# Patient Record
Sex: MALE | Race: WHITE | NOT HISPANIC OR LATINO | URBAN - NONMETROPOLITAN AREA
[De-identification: names, ages, dates, MRNs, and addresses within clinical notes are randomized per-mention and may not be internally consistent; named-entity substitution may affect disease eponyms.]

---

## 2017-02-02 ENCOUNTER — TELEPHONE (OUTPATIENT)
Dept: CARDIOLOGY | Facility: CLINIC | Age: 62
End: 2017-02-02

## 2017-02-02 NOTE — TELEPHONE ENCOUNTER
Patient wife called requesting samples of Entresto 24-26mg bid due to no longer approved. Samples of Entresto 24-26mg ready for -28 tablets.

## 2017-02-03 ENCOUNTER — TELEPHONE (OUTPATIENT)
Dept: CARDIOLOGY | Facility: CLINIC | Age: 62
End: 2017-02-03

## 2017-02-07 ENCOUNTER — TELEPHONE (OUTPATIENT)
Dept: CARDIOLOGY | Facility: CLINIC | Age: 62
End: 2017-02-07

## 2017-02-10 ENCOUNTER — DOCUMENTATION (OUTPATIENT)
Dept: CARDIOLOGY | Facility: CLINIC | Age: 62
End: 2017-02-10

## 2017-03-01 ENCOUNTER — OFFICE VISIT (OUTPATIENT)
Dept: CARDIOLOGY | Facility: CLINIC | Age: 62
End: 2017-03-01

## 2017-03-01 VITALS
HEART RATE: 72 BPM | DIASTOLIC BLOOD PRESSURE: 58 MMHG | WEIGHT: 212 LBS | HEIGHT: 70 IN | SYSTOLIC BLOOD PRESSURE: 90 MMHG | BODY MASS INDEX: 30.35 KG/M2

## 2017-03-01 DIAGNOSIS — I42.9 CARDIOMYOPATHY (HCC): Primary | ICD-10-CM

## 2017-03-01 DIAGNOSIS — I50.30 CONGESTIVE HEART FAILURE WITH LV DIASTOLIC DYSFUNCTION, NYHA CLASS 2 (HCC): ICD-10-CM

## 2017-03-01 DIAGNOSIS — I20.9 ISCHEMIC CHEST PAIN (HCC): ICD-10-CM

## 2017-03-01 DIAGNOSIS — E78.00 HYPERCHOLESTEREMIA: ICD-10-CM

## 2017-03-01 DIAGNOSIS — E11.59 TYPE 2 DIABETES MELLITUS WITH OTHER CIRCULATORY COMPLICATION, WITH LONG-TERM CURRENT USE OF INSULIN (HCC): ICD-10-CM

## 2017-03-01 DIAGNOSIS — I25.110 CORONARY ARTERY DISEASE INVOLVING NATIVE CORONARY ARTERY OF NATIVE HEART WITH UNSTABLE ANGINA PECTORIS (HCC): Primary | ICD-10-CM

## 2017-03-01 DIAGNOSIS — Z78.9 PACED RHYTHM ON ELECTROCARDIOGRAM (ECG): ICD-10-CM

## 2017-03-01 DIAGNOSIS — Z79.899 MEDICATION MANAGEMENT: ICD-10-CM

## 2017-03-01 DIAGNOSIS — Z95.810 ICD (IMPLANTABLE CARDIOVERTER-DEFIBRILLATOR) IN PLACE: ICD-10-CM

## 2017-03-01 DIAGNOSIS — I25.10 CORONARY ARTERY DISEASE INVOLVING NATIVE CORONARY ARTERY OF NATIVE HEART WITHOUT ANGINA PECTORIS: ICD-10-CM

## 2017-03-01 DIAGNOSIS — R06.02 SHORTNESS OF BREATH: ICD-10-CM

## 2017-03-01 DIAGNOSIS — R53.83 OTHER FATIGUE: ICD-10-CM

## 2017-03-01 DIAGNOSIS — Z79.4 TYPE 2 DIABETES MELLITUS WITH OTHER CIRCULATORY COMPLICATION, WITH LONG-TERM CURRENT USE OF INSULIN (HCC): ICD-10-CM

## 2017-03-01 PROCEDURE — 93000 ELECTROCARDIOGRAM COMPLETE: CPT | Performed by: NURSE PRACTITIONER

## 2017-03-01 PROCEDURE — 99214 OFFICE O/P EST MOD 30 MIN: CPT | Performed by: NURSE PRACTITIONER

## 2017-03-01 PROCEDURE — 93289 INTERROG DEVICE EVAL HEART: CPT | Performed by: INTERNAL MEDICINE

## 2017-03-01 RX ORDER — FENOFIBRATE 145 MG/1
145 TABLET, COATED ORAL DAILY
COMMUNITY
End: 2019-12-04 | Stop reason: SDUPTHER

## 2017-03-01 RX ORDER — CARBIDOPA/LEVODOPA 25MG-250MG
2 TABLET ORAL 2 TIMES DAILY
COMMUNITY

## 2017-03-01 RX ORDER — ATORVASTATIN CALCIUM 80 MG/1
80 TABLET, FILM COATED ORAL DAILY
COMMUNITY
End: 2019-12-04 | Stop reason: SDUPTHER

## 2017-03-01 RX ORDER — NEBIVOLOL 5 MG/1
5 TABLET ORAL DAILY
COMMUNITY
End: 2019-06-05 | Stop reason: SDUPTHER

## 2017-03-01 RX ORDER — BACLOFEN 10 MG/1
10 TABLET ORAL 2 TIMES DAILY
COMMUNITY
End: 2017-09-06 | Stop reason: DRUGHIGH

## 2017-03-01 NOTE — PROGRESS NOTES
"Chief Complaint   Patient presents with   • Follow-up     6 month follow-up, brought medications with visit, recent labs per PCP. pacemaker checked today   • Chest Pain     reports has cp frequently which is not unusual for him   • Shortness of Breath     at rest and with activity   • Med Refill     need samples of entresto 24mg/26mg, nitro sl       Subjective       Adrian Samuel is a 61 y.o. male with complex cardiac problems for which he has had PTCA, bypass, and numerous caths in the past. A biventricular ICD placed in 2012. The last cath done was in March of 2015. The cath showed a lesion in the LAD after the LIMA and he had stenting of the same. Echo done in September 2015 showed his EF 30-35% with moderate to severe MR. He was supposed to had started Entresto, but apparently did not. He had been taking just losartan. In 2015, he was referred back to  heart failure clinic. Entresto restarted. Today he comes to the office and reports increase in nausea and \"feeling sick' toward evening. When lying down his fatigue improves. He is concerned over increase in chronic angina. Currently, his wife is ill and he is under more stress.        HPI         Cardiac History:    Past Surgical History   Procedure Laterality Date   • Heel spur excision       Removed   • Thyroid surgery Right 04/10/2009      Thyroid-Small colloid cyst right thyroid   • Cath lab procedure  1993     Cath-PTCA.   • Coronary artery bypass graft  1995     CABG LIMA TO LAD, SVG TO OM and SVG to PDA.   • Cath lab procedure  02/08/2005     Cath-() Patent Grafts   • Cath lab procedure  08/01/2005     Cath-(Dr. Sigala) Patent Grafts.   • Cath lab procedure  08/22/2006     Cath-Patent Grafts   • Converted (historical) holter  06/11/2006     Holter-PVC's noted   • Cath lab procedure  09/18/2007     Cath-100%LAD, 80%D2. 100%LCX, RCA. Patent Lima & SVG to PDA.   • Echo - converted  08/29/2007     Echo-EF50-55%, mild to mod. MR, mildly " hypokinetic septum.   • Cardiovascular stress test  02/19/2008     D.Myoview-75%THR. EF 43%. InferoLateral Infarct with Nba-Infarct Ischemia   • Other surgical history  11/10/2008     USGB-normal gallbladder and fatty liver   • Upper gastrointestinal endoscopy  11/10/2008     UGI-Small HH with slight narrowing of the distal esophagus.   • Other surgical history  11/13/2008     Hida scan-Normal   • Carotid artery - subclavian artery bypass graft  04/10/1989     Carotid US-No sig. stenosis noted   • Echo - converted  05/08/2009     Echo-EF45-49%, Moderate MR and PA pressure-42 mmHg.   • Cardiovascular stress test  05/08/2009     D.Myoview-78%THR. EF48%. Inferiolateral wall infarct and nba-infarct ischemia.   • Cath lab procedure  01/15/2010     Cath-Patent LIMA & SVG to PDA. 80% OM1-2.25 x 16mm Taxus Stent   • Cardiovascular stress test  03/03/2010     L.Myoview-Lateral Ischemia   • Echo - converted  01/18/2012     Echo-EF 25-30%   • Cath lab procedure  02/14/2012     Cath-IVUSLAD 60-70%, 2.55 x 20mm Promus stent Ramus   • Echo - converted  05/17/2012     Echo-EF<30%. AnteroSpectal WMA.   • Cardiovascular studies - converted  09/05/2012     MUGA-RVEF 41%, LVEF 38%   • Cardiovascular stress test  02/13/2013     L.Myoview-lateral wall ischemia   • Cath lab procedure  03/12/2013     Cath-3.5 x 28Promus stent SVG to RCA.   • Cardiovascular stress test  11/11/2013     L.Myoview-lateral wall ischemia   • Cath lab procedure  11/25/2013     Cath-(Dr. Sigala) EF30-35%, occluded circ and graft but increase collaterol flow manage medically.   • Other surgical history  02/28/2014     U/S Aorta-   • Cardiovascular stress test  11/18/2014     L.Myoview-([facility]) EF. Inferolateral Infarct   • Cath lab procedure  12/22/2014     Cath-85% SVG to PDA-   • Cardiovascular stress test  03/17/2015     L.Myoview-Sig Anterior Changes   • Cath lab procedure  03/18/2015     Cath-90%LIMA to LAD-80% SVG to PDA-2.75 x 8 Resolute   • Carotid artery  - subclavian artery bypass graft  05/21/2015     Carotid US-no hemodyanamically significant stenosis   • Echo - converted  09/02/2015     Echo-EF 30-35%, mod to severe MR   • Cardiac defibrillator placement         Current Outpatient Prescriptions   Medication Sig Dispense Refill   • aspirin 81 MG EC tablet Take 81 mg by mouth daily.     • atorvastatin (LIPITOR) 80 MG tablet Take 80 mg by mouth Daily.     • baclofen (LIORESAL) 10 MG tablet Take 10 mg by mouth 2 (Two) Times a Day.     • carbidopa-levodopa (SINEMET)  MG per tablet Take 1 tablet by mouth 3 (Three) Times a Day.     • cetirizine (ZyrTEC) 10 MG tablet Take 10 mg by mouth daily.     • citalopram (CeleXA) 40 MG tablet Take 40 mg by mouth daily.     • fenofibrate (TRICOR) 145 MG tablet Take 145 mg by mouth Daily.     • Insulin Aspart Prot & Aspart (NOVOLOG MIX 70/30 PENFILL SC) Inject  under the skin.     • nebivolol (BYSTOLIC) 5 MG tablet Take 5 mg by mouth Daily.     • nitroglycerin (NITROSTAT) 0.4 MG SL tablet Place 0.4 mg under the tongue every 5 (five) minutes as needed for chest pain. Take no more than 3 doses in 15 minutes.     • NITROGLYCERIN TRANSDERMAL TD Place 0.4 mg on the skin Daily.     • O2 (OXYGEN) Inhale 2 L/min at night as needed.     • ondansetron (ZOFRAN) 4 MG tablet Take 4 mg by mouth 3 (three) times a day as needed for nausea or vomiting.     • oxyCODONE-acetaminophen (PERCOCET)  MG per tablet Take 1 tablet by mouth 3 (three) times a day as needed for moderate pain (4-6).     • pantoprazole (PROTONIX) 40 MG EC tablet Take 40 mg by mouth daily.     • polyethylene glycol (MIRALAX) packet Take 17 g by mouth daily as needed.     • sacubitril-valsartan (ENTRESTO) 24-26 MG tablet Take 1 tablet by mouth 2 (Two) Times a Day. 28 tablet 0   • ticagrelor (BRILINTA) 90 MG tablet tablet Take 90 mg by mouth 2 (Two) Times a Day.     • vitamin D (ERGOCALCIFEROL) 70225 UNITS capsule capsule Take 50,000 Units by mouth 1 (one) time per week.        No current facility-administered medications for this visit.        Effient [prasugrel]; Imdur [isosorbide dinitrate]; Livalo [pitavastatin]; and Ranolazine    Past Medical History   Diagnosis Date   • Disease of thyroid gland 04/10/2009     small colloid cyst right thyroid   • Esophageal dilatation      Followed by Dr. Kyle   • Heel spurs removed    • Hypercholesterolemia    • Hypertension      Systemic   • IHD (ischemic heart disease)    • Other abnormal cytological findings on specimens from anus 2006 & 2007     EECP   • Pain      Chronic       Social History     Social History   • Marital status:      Spouse name: N/A   • Number of children: N/A   • Years of education: N/A     Occupational History   • Not on file.     Social History Main Topics   • Smoking status: Former Smoker     Quit date: 1995   • Smokeless tobacco: Current User     Types: Chew      Comment: 11/24/2015   • Alcohol use No   • Drug use: Defer   • Sexual activity: Not on file     Other Topics Concern   • Not on file     Social History Narrative       Family History   Problem Relation Age of Onset   • Heart disease Mother    • Stroke Mother    • Diabetes Mother    • Diabetes Father    • Cirrhosis Father    • Heart disease Other    • Diabetes Other        Review of Systems   Constitutional: Positive for activity change and fatigue. Negative for appetite change and fever.   HENT: Negative for congestion, nosebleeds, sinus pressure and trouble swallowing.    Respiratory: Positive for chest tightness and shortness of breath. Negative for wheezing.    Cardiovascular: Positive for chest pain. Negative for palpitations and leg swelling.   Gastrointestinal: Positive for nausea. Negative for abdominal distention, abdominal pain, blood in stool and vomiting.   Genitourinary: Negative for dysuria and hematuria.   Musculoskeletal: Positive for arthralgias, back pain and myalgias. Negative for gait problem.   Skin: Negative.    Neurological:  "Positive for weakness. Negative for dizziness, syncope, facial asymmetry, speech difficulty, light-headedness and headaches.   Hematological: Bruises/bleeds easily.   Psychiatric/Behavioral: Positive for sleep disturbance. Negative for confusion. The patient is nervous/anxious.        Diabetes- Yes  Thyroid-abnormal    Objective     Visit Vitals   • BP 90/58 (BP Location: Left arm)   • Pulse 72   • Ht 70\" (177.8 cm)   • Wt 212 lb (96.2 kg)   • BMI 30.42 kg/m2       Physical Exam   Constitutional: He is oriented to person, place, and time. Vital signs are normal.   Eyes: Pupils are equal, round, and reactive to light.   Neck: Neck supple. No JVD present. Carotid bruit is not present.   Cardiovascular: Normal rate, regular rhythm, S1 normal and S2 normal.    Murmur heard.   Systolic murmur is present with a grade of 2/6   Pulses:       Radial pulses are 2+ on the right side, and 2+ on the left side.   Pulmonary/Chest: Effort normal and breath sounds normal.   Abdominal: Soft. Bowel sounds are normal.   Neurological: He is alert and oriented to person, place, and time.   Skin: Skin is warm and dry.   Vitals reviewed.          ECG 12 Lead  Date/Time: 3/1/2017 1:41 PM  Performed by: PRISCILA RICH  Authorized by: PRISCILA RICH   Comparison: compared with previous ECG from 9/7/2016  Comparison to previous ECG: Atrial and ventricular paced  Rhythm: paced  BPM: 70                  Assessment/Plan      Adrian was seen today for follow-up, chest pain, shortness of breath and med refill.    Diagnoses and all orders for this visit:    Coronary artery disease involving native coronary artery of native heart with unstable angina pectoris  -     Stress Test With Myocardial Perfusion One Day; Future  -     Adult Transthoracic Echo Complete; Future    Ischemic chest pain  -     Stress Test With Myocardial Perfusion One Day; Future  -     Adult Transthoracic Echo Complete; Future    Medication management  -     Stress Test With " Myocardial Perfusion One Day; Future  -     Adult Transthoracic Echo Complete; Future    Type 2 diabetes mellitus with other circulatory complication, with long-term current use of insulin    Hypercholesteremia  -     Stress Test With Myocardial Perfusion One Day; Future  -     Adult Transthoracic Echo Complete; Future    Other fatigue  -     Stress Test With Myocardial Perfusion One Day; Future  -     Adult Transthoracic Echo Complete; Future    Shortness of breath  -     Stress Test With Myocardial Perfusion One Day; Future  -     Adult Transthoracic Echo Complete; Future    Congestive heart failure with LV diastolic dysfunction, NYHA class 2  -     Stress Test With Myocardial Perfusion One Day; Future  -     Adult Transthoracic Echo Complete; Future    ICD (implantable cardioverter-defibrillator) in place  -     ECG 12 Lead    Paced rhythm on electrocardiogram (ECG)  -     ECG 12 Lead      Mr. Samuel is concerned over worsening cardiac symptoms. His blood pressure is a little lower today but he reports he tolerates it fine and continues his current medication including Entresto. Samples given to him.  His last cardiac workup was in 2015. To reassess overall LV function and for worsening ischemia, a Lexiscan stress test and echocardiogram scheduled.   He follows with you for management of labs. We will see him back in 6 months or sooner for problems. Further recommendations based on test results.              Electronically signed by JONH Nolan,  March 1, 2017 2:22 PM

## 2017-03-02 ENCOUNTER — DOCUMENTATION (OUTPATIENT)
Dept: CARDIOLOGY | Facility: CLINIC | Age: 62
End: 2017-03-02

## 2017-03-03 ENCOUNTER — DOCUMENTATION (OUTPATIENT)
Dept: CARDIOLOGY | Facility: CLINIC | Age: 62
End: 2017-03-03

## 2017-03-03 NOTE — PROGRESS NOTES
Appeals form sent to [facility] for Entresto  Entresto approved until 03/08/2019. Patient aware

## 2017-03-15 ENCOUNTER — HOSPITAL ENCOUNTER (OUTPATIENT)
Dept: CARDIOLOGY | Facility: HOSPITAL | Age: 62
Discharge: HOME | End: 2017-03-15

## 2017-03-15 ENCOUNTER — OUTSIDE FACILITY SERVICE (OUTPATIENT)
Dept: CARDIOLOGY | Facility: CLINIC | Age: 62
End: 2017-03-15

## 2017-03-15 LAB
MAXIMAL PREDICTED HEART RATE: 158 BPM
STRESS TARGET HR: 134 BPM

## 2017-03-15 PROCEDURE — 78452 HT MUSCLE IMAGE SPECT MULT: CPT | Performed by: INTERNAL MEDICINE

## 2017-03-15 PROCEDURE — 93306 TTE W/DOPPLER COMPLETE: CPT

## 2017-03-15 PROCEDURE — 25010000002 REGADENOSON 0.4 MG/5ML SOLUTION: Performed by: INTERNAL MEDICINE

## 2017-03-15 PROCEDURE — 78452 HT MUSCLE IMAGE SPECT MULT: CPT

## 2017-03-15 PROCEDURE — 93018 CV STRESS TEST I&R ONLY: CPT | Performed by: INTERNAL MEDICINE

## 2017-03-15 PROCEDURE — 93017 CV STRESS TEST TRACING ONLY: CPT

## 2017-03-15 PROCEDURE — 93306 TTE W/DOPPLER COMPLETE: CPT | Performed by: INTERNAL MEDICINE

## 2017-03-15 PROCEDURE — A9500 TC99M SESTAMIBI: HCPCS | Performed by: INTERNAL MEDICINE

## 2017-03-15 PROCEDURE — 0 TECHNETIUM SESTAMIBI: Performed by: INTERNAL MEDICINE

## 2017-03-15 RX ADMIN — REGADENOSON 0.4 MG: 0.08 INJECTION, SOLUTION INTRAVENOUS at 14:00

## 2017-03-15 RX ADMIN — Medication 1 DOSE: at 14:00

## 2017-03-17 ENCOUNTER — TELEPHONE (OUTPATIENT)
Dept: CARDIOLOGY | Facility: CLINIC | Age: 62
End: 2017-03-17

## 2017-03-17 RX ORDER — NITROGLYCERIN 120 MG/1
1 PATCH TRANSDERMAL DAILY
Qty: 30 PATCH | Refills: 8 | Status: SHIPPED | OUTPATIENT
Start: 2017-03-17 | End: 2019-06-05 | Stop reason: SDUPTHER

## 2017-03-17 NOTE — TELEPHONE ENCOUNTER
Patient aware of stress test results and recommendations.  Increase NTG patch to 0.6 mg.    Aware to call the office if symptoms persist.

## 2017-07-10 RX ORDER — POTASSIUM CHLORIDE 750 MG/1
TABLET, FILM COATED, EXTENDED RELEASE ORAL
Qty: 30 TABLET | Refills: 0 | OUTPATIENT
Start: 2017-07-10

## 2017-09-06 ENCOUNTER — OFFICE VISIT (OUTPATIENT)
Dept: CARDIOLOGY | Facility: CLINIC | Age: 62
End: 2017-09-06

## 2017-09-06 VITALS
BODY MASS INDEX: 30.78 KG/M2 | SYSTOLIC BLOOD PRESSURE: 110 MMHG | DIASTOLIC BLOOD PRESSURE: 60 MMHG | HEART RATE: 72 BPM | HEIGHT: 70 IN | WEIGHT: 215 LBS

## 2017-09-06 DIAGNOSIS — I25.118 CORONARY ARTERY DISEASE OF NATIVE ARTERY OF NATIVE HEART WITH STABLE ANGINA PECTORIS (HCC): Primary | ICD-10-CM

## 2017-09-06 DIAGNOSIS — Z95.810 ICD (IMPLANTABLE CARDIOVERTER-DEFIBRILLATOR) IN PLACE: ICD-10-CM

## 2017-09-06 DIAGNOSIS — I50.30 CONGESTIVE HEART FAILURE WITH LV DIASTOLIC DYSFUNCTION, NYHA CLASS 2 (HCC): ICD-10-CM

## 2017-09-06 DIAGNOSIS — Z79.4 TYPE 2 DIABETES MELLITUS WITH OTHER CIRCULATORY COMPLICATION, WITH LONG-TERM CURRENT USE OF INSULIN (HCC): ICD-10-CM

## 2017-09-06 DIAGNOSIS — E11.59 TYPE 2 DIABETES MELLITUS WITH OTHER CIRCULATORY COMPLICATION, WITH LONG-TERM CURRENT USE OF INSULIN (HCC): ICD-10-CM

## 2017-09-06 DIAGNOSIS — E78.00 HYPERCHOLESTEREMIA: ICD-10-CM

## 2017-09-06 DIAGNOSIS — I42.9 CARDIOMYOPATHY (HCC): ICD-10-CM

## 2017-09-06 DIAGNOSIS — Z95.810 BIVENTRICULAR ICD (IMPLANTABLE CARDIOVERTER-DEFIBRILLATOR) IN PLACE: ICD-10-CM

## 2017-09-06 DIAGNOSIS — I25.110 CORONARY ARTERY DISEASE INVOLVING NATIVE CORONARY ARTERY OF NATIVE HEART WITH UNSTABLE ANGINA PECTORIS (HCC): Primary | ICD-10-CM

## 2017-09-06 PROCEDURE — 99213 OFFICE O/P EST LOW 20 MIN: CPT | Performed by: NURSE PRACTITIONER

## 2017-09-06 PROCEDURE — 93289 INTERROG DEVICE EVAL HEART: CPT | Performed by: INTERNAL MEDICINE

## 2017-09-06 RX ORDER — POTASSIUM CHLORIDE 750 MG/1
10 TABLET, EXTENDED RELEASE ORAL DAILY
COMMUNITY
End: 2018-07-02 | Stop reason: ALTCHOICE

## 2017-09-06 RX ORDER — PREGABALIN 100 MG/1
75 CAPSULE ORAL 2 TIMES DAILY
COMMUNITY

## 2017-09-06 RX ORDER — BACLOFEN 10 MG/1
10 TABLET ORAL DAILY PRN
COMMUNITY

## 2017-09-06 RX ORDER — HYDROCODONE BITARTRATE AND ACETAMINOPHEN 10; 325 MG/1; MG/1
1 TABLET ORAL EVERY 6 HOURS PRN
COMMUNITY

## 2017-09-06 NOTE — PROGRESS NOTES
"Chief Complaint   Patient presents with   • Follow-up     cardiac management, labs per PCP. patient brought medication's with visit. patient has BIV ICD  St. Cam checked today, reports taking water pill as needed but unable to recall the name of it and did not bring in with visit.   • Chest Pain     \"at times\"   • Palpitations     \"at times\"   • Shortness of Breath     \"not real bad\" wears C-pap at night oxygen during the day       Subjective       Adrian Samuel is a 62 y.o. male  with complex cardiac problems for which he has had PTCA, bypass, and numerous caths in the past. A biventricular ICD placed in 2012. The last cath done was in March of 2015. The cath showed a lesion in the LAD after the LIMA and he had stenting of the same. Echo done in September 2015 showed his EF 30-35% with moderate to severe MR. He was supposed to had started Entresto, but apparently did not. He had been taking just losartan. In 2015, he was referred back to  heart failure clinic. Entresto restarted. In March 2017, repeat Lexiscan stress showed lateral wall ischemia. The dose of nitrodur patch increased. Echocardiogram showed LV function remained low with EF 35-40%. Cath recommended if symtpoms persisted.   Today he comes to the office for a follow up appointment and Bi V ICD check. He reports chest pain seems to be some better overall, occasionally he has pressure and jaw pain for which he has taken a nitro with relief. His wife is currently in the hospital and he reports she is not doing very well.     HPI         Cardiac History:    Past Surgical History:   Procedure Laterality Date   • CARDIAC DEFIBRILLATOR PLACEMENT     • CARDIOVASCULAR STRESS TEST  02/19/2008    D.Myoview-75%THR. EF 43%. InferoLateral Infarct with Nba-Infarct Ischemia   • CARDIOVASCULAR STRESS TEST  05/08/2009    D.Myoview-78%THR. EF48%. Inferiolateral wall infarct and nba-infarct ischemia.   • CARDIOVASCULAR STRESS TEST  03/03/2010    L.Myoview-Lateral " Ischemia   • CARDIOVASCULAR STRESS TEST  02/13/2013    L.Myoview-lateral wall ischemia   • CARDIOVASCULAR STRESS TEST  11/11/2013    L.Myoview-lateral wall ischemia   • CARDIOVASCULAR STRESS TEST  11/18/2014    L.Myoview-([facility]) EF. Inferolateral Infarct   • CARDIOVASCULAR STRESS TEST  03/17/2015    L.Myoview-Sig Anterior Changes   • CARDIOVASCULAR STRESS TEST  03/15/2017    Lexiscan- lateral wall ischemia, nitrodur increased, cath if symptoms persist   • CAROTID ARTERY - SUBCLAVIAN ARTERY BYPASS GRAFT  04/10/1989    Carotid US-No sig. stenosis noted   • CAROTID ARTERY - SUBCLAVIAN ARTERY BYPASS GRAFT  05/21/2015    Carotid US-no hemodyanamically significant stenosis   • CATH LAB PROCEDURE  1993    Cath-PTCA.   • CATH LAB PROCEDURE  02/08/2005    Cath-() Patent Grafts   • CATH LAB PROCEDURE  08/01/2005    Cath-(Dr. Sigala) Patent Grafts.   • CATH LAB PROCEDURE  08/22/2006    Cath-Patent Grafts   • CATH LAB PROCEDURE  09/18/2007    Cath-100%LAD, 80%D2. 100%LCX, RCA. Patent Lima & SVG to PDA.   • CATH LAB PROCEDURE  01/15/2010    Cath-Patent LIMA & SVG to PDA. 80% OM1-2.25 x 16mm Taxus Stent   • CATH LAB PROCEDURE  02/14/2012    Cath-IVUSLAD 60-70%, 2.55 x 20mm Promus stent Ramus   • CATH LAB PROCEDURE  03/12/2013    Cath-3.5 x 28Promus stent SVG to RCA.   • CATH LAB PROCEDURE  11/25/2013    Cath-(Dr. Sigala) EF30-35%, occluded circ and graft but increase collaterol flow manage medically.   • CATH LAB PROCEDURE  12/22/2014    Cath-85% SVG to PDA-   • CATH LAB PROCEDURE  03/18/2015    Cath-90%LIMA to LAD-80% SVG to PDA-2.75 x 8 Resolute   • CONVERTED (HISTORICAL) CARDIOVASCULAR STUDIES  09/05/2012    MUGA-RVEF 41%, LVEF 38%   • CONVERTED (HISTORICAL) HOLTER  06/11/2006    Holter-PVC's noted   • CORONARY ARTERY BYPASS GRAFT  1995    CABG LIMA TO LAD, SVG TO OM and SVG to PDA.   • ECHO - CONVERTED  08/29/2007    Echo-EF50-55%, mild to mod. MR, mildly hypokinetic septum.   • ECHO - CONVERTED  05/08/2009     Echo-EF45-49%, Moderate MR and PA pressure-42 mmHg.   • ECHO - CONVERTED  01/18/2012    Echo-EF 25-30%   • ECHO - CONVERTED  05/17/2012    Echo-EF<30%. AnteroSpectal WMA.   • ECHO - CONVERTED  09/02/2015    Echo-EF 30-35%, mod to severe MR   • ECHO - CONVERTED  03/15/2017    EF 35-40%, RVSP 25-30 mmHg   • HEEL SPUR EXCISION      Removed   • OTHER SURGICAL HISTORY  11/10/2008    USGB-normal gallbladder and fatty liver   • OTHER SURGICAL HISTORY  11/13/2008    Hida scan-Normal   • OTHER SURGICAL HISTORY  02/28/2014    U/S Aorta-   • THYROID SURGERY Right 04/10/2009    US Thyroid-Small colloid cyst right thyroid   • UPPER GASTROINTESTINAL ENDOSCOPY  11/10/2008    UGI-Small HH with slight narrowing of the distal esophagus.       Current Outpatient Prescriptions   Medication Sig Dispense Refill   • aspirin 81 MG EC tablet Take 81 mg by mouth daily.     • atorvastatin (LIPITOR) 80 MG tablet Take 80 mg by mouth Daily.     • baclofen (LIORESAL) 10 MG tablet Take 10 mg by mouth Daily As Needed for Muscle Spasms.     • carbidopa-levodopa (SINEMET)  MG per tablet Take 1 tablet by mouth 3 (Three) Times a Day.     • cetirizine (ZyrTEC) 10 MG tablet Take 10 mg by mouth daily.     • citalopram (CeleXA) 40 MG tablet Take 40 mg by mouth daily.     • fenofibrate (TRICOR) 145 MG tablet Take 145 mg by mouth Daily.     • HYDROcodone-acetaminophen (NORCO)  MG per tablet Take 1 tablet by mouth Every 6 (Six) Hours As Needed for Moderate Pain .     • Insulin Aspart Prot & Aspart (NOVOLOG MIX 70/30 PENFILL SC) Inject  under the skin.     • nebivolol (BYSTOLIC) 5 MG tablet Take 5 mg by mouth Daily.     • nitroglycerin (NITRODUR) 0.6 MG/HR patch Place 1 patch on the skin Daily. 30 patch 8   • nitroglycerin (NITROSTAT) 0.4 MG SL tablet Place 0.4 mg under the tongue every 5 (five) minutes as needed for chest pain. Take no more than 3 doses in 15 minutes.     • O2 (OXYGEN) Inhale 2 L/min at night as needed.     • ondansetron (ZOFRAN)  4 MG tablet Take 4 mg by mouth 3 (three) times a day as needed for nausea or vomiting.     • pantoprazole (PROTONIX) 40 MG EC tablet Take 40 mg by mouth daily.     • polyethylene glycol (MIRALAX) packet Take 17 g by mouth daily as needed.     • potassium chloride (K-DUR,KLOR-CON) 10 MEQ CR tablet Take 10 mEq by mouth Daily.     • pregabalin (LYRICA) 75 MG capsule Take 75 mg by mouth Every Night.     • sacubitril-valsartan (ENTRESTO) 24-26 MG tablet Take 1 tablet by mouth 2 (Two) Times a Day. 28 tablet 0   • ticagrelor (BRILINTA) 90 MG tablet tablet Take 90 mg by mouth 2 (Two) Times a Day.     • vitamin D (ERGOCALCIFEROL) 92478 UNITS capsule capsule Take 50,000 Units by mouth 1 (one) time per week.       No current facility-administered medications for this visit.        Effient [prasugrel]; Imdur [isosorbide dinitrate]; Livalo [pitavastatin]; and Ranolazine    Past Medical History:   Diagnosis Date   • Disease of thyroid gland 04/10/2009    small colloid cyst right thyroid   • Esophageal dilatation     Followed by Dr. Kyle   • Heel spurs removed    • Hypercholesterolemia    • Hypertension     Systemic   • IHD (ischemic heart disease)    • Other abnormal cytological findings on specimens from anus 2006 & 2007    CP   • Pain     Chronic       Social History     Social History   • Marital status:      Spouse name: N/A   • Number of children: N/A   • Years of education: N/A     Occupational History   • Not on file.     Social History Main Topics   • Smoking status: Former Smoker     Quit date: 1995   • Smokeless tobacco: Current User     Types: Chew      Comment: 11/24/2015   • Alcohol use No   • Drug use: Defer   • Sexual activity: Not on file     Other Topics Concern   • Not on file     Social History Narrative       Family History   Problem Relation Age of Onset   • Heart disease Mother    • Stroke Mother    • Diabetes Mother    • Diabetes Father    • Cirrhosis Father    • Heart disease Other    • Diabetes  "Other        Review of Systems   Constitution: Positive for malaise/fatigue. Negative for decreased appetite and fever.   HENT: Negative for congestion, nosebleeds and sore throat.    Eyes: Negative for blurred vision.   Cardiovascular: Positive for chest pain (\"some better\" ) and dyspnea on exertion. Negative for irregular heartbeat, leg swelling and near-syncope.   Respiratory: Positive for shortness of breath. Negative for cough.    Endocrine: Negative for cold intolerance and heat intolerance.   Hematologic/Lymphatic: Negative for bleeding problem. Does not bruise/bleed easily.   Skin: Negative for itching and nail changes.   Musculoskeletal: Negative for arthritis, falls and myalgias.   Gastrointestinal: Negative for abdominal pain, dysphagia, heartburn, melena and nausea.   Genitourinary: Negative for frequency and hematuria.   Neurological: Negative for dizziness, light-headedness, seizures and vertigo.   Psychiatric/Behavioral: Negative for altered mental status and memory loss.   Allergic/Immunologic: Negative for environmental allergies and hives.    Diabetes- Yes  Thyroid-abnormal    Objective     /60 (BP Location: Left arm)  Pulse 72  Ht 70\" (177.8 cm)  Wt 215 lb (97.5 kg)  BMI 30.85 kg/m2    Physical Exam   Constitutional: He is oriented to person, place, and time. He appears well-nourished.   HENT:   Head: Normocephalic.   Eyes: Conjunctivae are normal. Pupils are equal, round, and reactive to light.   Neck: Normal range of motion. Neck supple. No JVD present. Carotid bruit is not present.   Cardiovascular: Normal rate, regular rhythm, S1 normal and S2 normal.    Murmur heard.   Systolic murmur is present with a grade of 2/6   Pulses:       Radial pulses are 2+ on the right side, and 2+ on the left side.   Pulmonary/Chest: Effort normal and breath sounds normal. He has no wheezes. He has no rales.   Abdominal: Soft. Bowel sounds are normal. He exhibits no distension. There is no tenderness. "   Musculoskeletal: Normal range of motion. He exhibits no edema.   Neurological: He is alert and oriented to person, place, and time.   Skin: Skin is warm. No rash noted. No pallor.   Psychiatric: He has a normal mood and affect. His behavior is normal. Thought content normal.      Procedures          Assessment/Plan      Adrian was seen today for follow-up, chest pain, palpitations and shortness of breath.    Diagnoses and all orders for this visit:    Coronary artery disease of native artery of native heart with stable angina pectoris    Congestive heart failure with LV diastolic dysfunction, NYHA class 2    Biventricular ICD (implantable cardioverter-defibrillator) in place    Hypercholesteremia    Type 2 diabetes mellitus with other circulatory complication, with long-term current use of insulin    We reviewed the reports of his last stress test which showed lateral wall ischemia. Currently, he reports angina stable with medication. He understands to go to emergency department if develop significant chest pain. If symptoms slightly worsen he can call the office and cardiac cath will be considered. He is currently on Brilinta 90 mg doses twice a day and low dose aspirin without increased bruising or signs of bleeding. We will continue same doses at this time. His blood pressure is at goal. Same dose Entresto and Bystolic recommended. His Biv ICD interrogation today shows normal function with 2.5 years battery life. No changes made today. He follows with you for management of labs. Please send a copy of most recent lab report. I encouraged him on good diabetic diet, low salt, low fat.  We will see him back in 6 months for follow up and ICD check.                Electronically signed by JONH Nolan,  September 6, 2017 1:10 PM

## 2017-09-12 PROBLEM — I42.9 CARDIOMYOPATHY (HCC): Status: ACTIVE | Noted: 2017-09-12

## 2017-11-16 ENCOUNTER — TELEPHONE (OUTPATIENT)
Dept: CARDIOLOGY | Facility: CLINIC | Age: 62
End: 2017-11-16

## 2017-11-16 NOTE — TELEPHONE ENCOUNTER
Joseph with [employer] called stating insurance will not cover Bystolic. Asked which beta blocker would you recommend.

## 2017-11-16 NOTE — TELEPHONE ENCOUNTER
Notified Joseph with [facility] regarding recommendation to change Bystolic to Metoprolol Succinate 50 mg QD

## 2018-01-08 RX ORDER — POTASSIUM CHLORIDE 750 MG/1
TABLET, FILM COATED, EXTENDED RELEASE ORAL
Qty: 30 TABLET | Refills: 0 | OUTPATIENT
Start: 2018-01-08

## 2018-05-02 ENCOUNTER — TELEPHONE (OUTPATIENT)
Dept: CARDIOLOGY | Facility: CLINIC | Age: 63
End: 2018-05-02

## 2018-05-02 ENCOUNTER — OFFICE VISIT (OUTPATIENT)
Dept: CARDIOLOGY | Facility: CLINIC | Age: 63
End: 2018-05-02

## 2018-05-02 VITALS
DIASTOLIC BLOOD PRESSURE: 82 MMHG | BODY MASS INDEX: 30.64 KG/M2 | HEART RATE: 68 BPM | HEIGHT: 70 IN | WEIGHT: 214 LBS | SYSTOLIC BLOOD PRESSURE: 112 MMHG

## 2018-05-02 DIAGNOSIS — I50.30 CONGESTIVE HEART FAILURE WITH LV DIASTOLIC DYSFUNCTION, NYHA CLASS 2 (HCC): ICD-10-CM

## 2018-05-02 DIAGNOSIS — I25.5 ISCHEMIC CARDIOMYOPATHY: ICD-10-CM

## 2018-05-02 DIAGNOSIS — I25.118 CORONARY ARTERY DISEASE OF NATIVE ARTERY OF NATIVE HEART WITH STABLE ANGINA PECTORIS (HCC): Primary | ICD-10-CM

## 2018-05-02 DIAGNOSIS — E78.00 HYPERCHOLESTEREMIA: ICD-10-CM

## 2018-05-02 DIAGNOSIS — Z95.810 ICD (IMPLANTABLE CARDIOVERTER-DEFIBRILLATOR) IN PLACE: Primary | ICD-10-CM

## 2018-05-02 DIAGNOSIS — I20.8 STABLE ANGINA PECTORIS (HCC): ICD-10-CM

## 2018-05-02 DIAGNOSIS — R06.02 SHORTNESS OF BREATH: ICD-10-CM

## 2018-05-02 DIAGNOSIS — Z95.810 ICD (IMPLANTABLE CARDIOVERTER-DEFIBRILLATOR) IN PLACE: ICD-10-CM

## 2018-05-02 DIAGNOSIS — E11.59 TYPE 2 DIABETES MELLITUS WITH OTHER CIRCULATORY COMPLICATION, WITH LONG-TERM CURRENT USE OF INSULIN (HCC): ICD-10-CM

## 2018-05-02 DIAGNOSIS — Z79.4 TYPE 2 DIABETES MELLITUS WITH OTHER CIRCULATORY COMPLICATION, WITH LONG-TERM CURRENT USE OF INSULIN (HCC): ICD-10-CM

## 2018-05-02 PROCEDURE — 93284 PRGRMG EVAL IMPLANTABLE DFB: CPT | Performed by: INTERNAL MEDICINE

## 2018-05-02 PROCEDURE — 99214 OFFICE O/P EST MOD 30 MIN: CPT | Performed by: NURSE PRACTITIONER

## 2018-05-02 NOTE — TELEPHONE ENCOUNTER
April,    Can you call Mr. Samuel and see if he would like to continue with radial approach.  If so, we will refer to Dr. Fuentes.      Thank you

## 2018-05-02 NOTE — PROGRESS NOTES
Chief Complaint   Patient presents with   • Follow-up     For cardiac management. [facility] BiV ICD checked today. PCP refills meds. Didn't bring med list. Will have labs per PCP next month.    • Chest Pain     Worse with exertion. At times is like a pressure and sometimes is a sharp pain. Occasionally radiates to left arm and jaw. Says it has worsened from before.    • Shortness of Breath     Worse with exertion. Worse than before.        Subjective       Adrian Samuel is a 62 y.o. male  with history of HTN, hyperlipidemia, IDDM, and complex cardiac problems for which he has had PTCA, bypass, and numerous caths in the past. A biventricular ICD placed in 2012. His last cardiac cath was in March of 2015. The cath showed a lesion in the LAD after the LIMA and he had stenting of the same. Echo done in September 2015 showed his EF 30-35% with moderate to severe MR. He was supposed to had started Entresto, but apparently did not. He had been taking just losartan. In 2015, he was referred back to  heart failure clinic. Entresto restarted. In March 2017, repeat Lexiscan stress showed lateral wall ischemia. The dose of nitrodur patch increased. Echocardiogram showed LV function remained low with EF 35-40%. Cath recommended if symptoms persisted.  He came in today for his follow up visit and to have his device interrogated.  For the last couple of months, he feels more short of breath especially in early morning.  He has chest pain, worse with exertion, relieved by 1 to 3 sl nitro.  The pain is sharp in nature to left chest, radiates to left arm and jaw.  He is using sl nitro daily.  He denies heavy meals in evening and limits salt intake.  He denies orthopnea or edema.  He is more tired and fatigued.  He continues to chew tobacco.  No recent labs which are managed by PCP due next month.  He is not sure how well diabetes managed.  Labs one year ago, BUN/Cr 16/1.2, A1C 9.2%, no lipids available.  He is on high intensity  statin.  BiV/ICD interrogation reported as normal today.      HPI         Cardiac History:    Past Surgical History:   Procedure Laterality Date   • CARDIAC CATHETERIZATION  1993    Cath-PTCA.   • CARDIAC CATHETERIZATION  02/08/2005    Cath-() Patent Grafts   • CARDIAC CATHETERIZATION  08/01/2005    Cath-(Dr. Sigala) Patent Grafts.   • CARDIAC CATHETERIZATION  09/18/2007    Cath-100%LAD, 80%D2. 100%LCX, RCA. Patent Lima & SVG to PDA.   • CARDIAC CATHETERIZATION  08/22/2006    Cath-Patent Grafts   • CARDIAC CATHETERIZATION  01/15/2010    Cath-Patent LIMA & SVG to PDA. 80% OM1-2.25 x 16mm Taxus Stent   • CARDIAC CATHETERIZATION  02/14/2012    Cath-IVUSLAD 60-70%, 2.55 x 20mm Promus stent Ramus   • CARDIAC CATHETERIZATION  03/12/2013    Cath-3.5 x 28Promus stent SVG to RCA.   • CARDIAC CATHETERIZATION  11/25/2013    Cath-(Dr. Sigala) EF30-35%, occluded circ and graft but increase collaterol flow manage medically.   • CARDIAC CATHETERIZATION  12/22/2014    Cath-85% SVG to PDA-   • CARDIAC CATHETERIZATION  03/18/2015    Cath-90%LIMA to LAD-80% SVG to PDA-2.75 x 8 Resolute   • CARDIAC DEFIBRILLATOR PLACEMENT  2012    BiV/ICD, MENG   • CARDIOVASCULAR STRESS TEST  02/19/2008    D.Myoview-75%THR. EF 43%. InferoLateral Infarct with Nba-Infarct Ischemia   • CARDIOVASCULAR STRESS TEST  05/08/2009    D.Myoview-78%THR. EF48%. Inferiolateral wall infarct and nba-infarct ischemia.   • CARDIOVASCULAR STRESS TEST  03/03/2010    L.Myoview-Lateral Ischemia   • CARDIOVASCULAR STRESS TEST  02/13/2013    L.Myoview-lateral wall ischemia   • CARDIOVASCULAR STRESS TEST  11/11/2013    L.Myoview-lateral wall ischemia   • CARDIOVASCULAR STRESS TEST  11/18/2014    L.Myoview-([facility]) EF. Inferolateral Infarct   • CARDIOVASCULAR STRESS TEST  03/17/2015    L.Myoview-Sig Anterior Changes   • CARDIOVASCULAR STRESS TEST  03/15/2017    Lexiscan- lateral wall ischemia, nitrodur increased, cath if symptoms persist   • CAROTID ARTERY -  SUBCLAVIAN ARTERY BYPASS GRAFT  04/10/1989    Carotid US-No sig. stenosis noted   • CONVERTED (HISTORICAL) CARDIOVASCULAR STUDIES  09/05/2012    MUGA-RVEF 41%, LVEF 38%   • CONVERTED (HISTORICAL) HOLTER  06/11/2006    Holter-PVC's noted   • CORONARY ARTERY BYPASS GRAFT  1995    CABG LIMA TO LAD, SVG TO OM and SVG to PDA.   • ECHO - CONVERTED  08/29/2007    Echo-EF50-55%, mild to mod. MR, mildly hypokinetic septum.   • ECHO - CONVERTED  05/08/2009    Echo-EF45-49%, Moderate MR and PA pressure-42 mmHg.   • ECHO - CONVERTED  01/18/2012    Echo-EF 25-30%   • ECHO - CONVERTED  05/17/2012    Echo-EF<30%. AnteroSpectal WMA.   • ECHO - CONVERTED  09/02/2015    Echo-EF 30-35%, mod to severe MR   • ECHO - CONVERTED  03/15/2017    EF 35-40%, RVSP 25-30 mmHg   • OTHER SURGICAL HISTORY  02/28/2014    U/S Aorta-   • US CAROTID UNILATERAL  05/21/2015    Carotid US-no hemodyanamically significant stenosis       Current Outpatient Prescriptions   Medication Sig Dispense Refill   • aspirin 81 MG EC tablet Take 81 mg by mouth daily.     • atorvastatin (LIPITOR) 80 MG tablet Take 80 mg by mouth Daily.     • baclofen (LIORESAL) 10 MG tablet Take 10 mg by mouth Daily As Needed for Muscle Spasms.     • carbidopa-levodopa (SINEMET)  MG per tablet Take 1 tablet by mouth 3 (Three) Times a Day.     • cetirizine (ZyrTEC) 10 MG tablet Take 10 mg by mouth daily.     • citalopram (CeleXA) 40 MG tablet Take 40 mg by mouth daily.     • fenofibrate (TRICOR) 145 MG tablet Take 145 mg by mouth Daily.     • HYDROcodone-acetaminophen (NORCO)  MG per tablet Take 1 tablet by mouth Every 6 (Six) Hours As Needed for Moderate Pain .     • Insulin Aspart Prot & Aspart (NOVOLOG MIX 70/30 PENFILL SC) Inject  under the skin.     • nebivolol (BYSTOLIC) 5 MG tablet Take 5 mg by mouth Daily.     • nitroglycerin (NITRODUR) 0.6 MG/HR patch Place 1 patch on the skin Daily. 30 patch 8   • nitroglycerin (NITROSTAT) 0.4 MG SL tablet Place 0.4 mg under the  tongue every 5 (five) minutes as needed for chest pain. Take no more than 3 doses in 15 minutes.     • O2 (OXYGEN) Inhale 2 L/min at night as needed.     • ondansetron (ZOFRAN) 4 MG tablet Take 4 mg by mouth 3 (three) times a day as needed for nausea or vomiting.     • pantoprazole (PROTONIX) 40 MG EC tablet Take 40 mg by mouth daily.     • polyethylene glycol (MIRALAX) packet Take 17 g by mouth daily as needed.     • potassium chloride (K-DUR,KLOR-CON) 10 MEQ CR tablet Take 10 mEq by mouth Daily.     • pregabalin (LYRICA) 75 MG capsule Take 75 mg by mouth Every Night.     • sacubitril-valsartan (ENTRESTO) 24-26 MG tablet Take 1 tablet by mouth 2 (Two) Times a Day. 28 tablet 0   • ticagrelor (BRILINTA) 90 MG tablet tablet Take 90 mg by mouth 2 (Two) Times a Day.     • vitamin D (ERGOCALCIFEROL) 12295 UNITS capsule capsule Take 50,000 Units by mouth 1 (one) time per week.       No current facility-administered medications for this visit.        Effient [prasugrel]; Imdur [isosorbide dinitrate]; Livalo [pitavastatin]; and Ranolazine    Past Medical History:   Diagnosis Date   • Disease of thyroid gland 04/10/2009    small colloid cyst right thyroid   • Esophageal dilatation     Followed by Dr. Kyle   • Heel spurs removed    • Hypercholesterolemia    • Hypertension     Systemic   • IHD (ischemic heart disease)    • Other abnormal cytological findings on specimens from anus 2006 & 2007    EECP   • Pain     Chronic       Social History     Social History   • Marital status:      Spouse name: N/A   • Number of children: N/A   • Years of education: N/A     Occupational History   • Not on file.     Social History Main Topics   • Smoking status: Former Smoker     Quit date: 1995   • Smokeless tobacco: Current User     Types: Chew      Comment: 11/24/2015   • Alcohol use No   • Drug use: Unknown   • Sexual activity: Not on file     Other Topics Concern   • Not on file     Social History Narrative   • No narrative on  "file       Family History   Problem Relation Age of Onset   • Heart disease Mother    • Stroke Mother    • Diabetes Mother    • Diabetes Father    • Cirrhosis Father    • Heart disease Other    • Diabetes Other        Review of Systems   Constitution: Positive for weakness and malaise/fatigue. Negative for decreased appetite and weight gain.   HENT: Negative.    Eyes: Negative.    Cardiovascular: Positive for chest pain and dyspnea on exertion. Negative for leg swelling, orthopnea, palpitations and syncope.   Respiratory: Positive for shortness of breath. Negative for cough and wheezing.    Endocrine: Negative.    Hematologic/Lymphatic: Negative.    Skin: Negative.    Musculoskeletal: Positive for joint pain and neck pain. Negative for muscle weakness and myalgias.   Gastrointestinal: Negative for abdominal pain and melena.   Genitourinary: Negative for dysuria and hematuria.   Neurological: Negative for dizziness.   Psychiatric/Behavioral: Negative for altered mental status and depression.   Allergic/Immunologic: Negative.       Diabetes- Yes  Thyroid-normal, no recent TSH available     Objective     /82   Pulse 68   Ht 177.8 cm (70\")   Wt 97.1 kg (214 lb)   BMI 30.71 kg/m²     Physical Exam   Constitutional: He is oriented to person, place, and time. He appears well-developed and well-nourished.   HENT:   Head: Normocephalic.   Eyes: Pupils are equal, round, and reactive to light.   Neck: Normal range of motion.   Cardiovascular: Normal rate, regular rhythm and intact distal pulses.    Murmur heard.  Pulmonary/Chest: Effort normal and breath sounds normal. No respiratory distress. He has no wheezes. He has no rales.   Abdominal: Soft. Bowel sounds are normal. He exhibits no distension. There is no tenderness.   Musculoskeletal: Normal range of motion. He exhibits no edema.   Neurological: He is alert and oriented to person, place, and time.   Skin: Skin is warm and dry.   Psychiatric: He has a normal " mood and affect.   Nursing note and vitals reviewed.     Procedures          Assessment/Plan    Heart rate and blood pressure stable.  With his stress in 2017 showing lateral wall ischemia with low EF and worsening anginal symptoms, discussed proceeding with cardiac cath and he agrees.  He would like to try radial approach.  Will discuss with Dr. Anaya and refer to Dr. Fuentes for further evaluation.  No changes made to medications.  He is unable to tolerate Imdur and Ranexa.  He is on nitro patch and uses sl nitro, advised to continue.  Entresto and Bystolic continued.  He remains on Brilinta and aspirin.  Advised to limit sodium to <1,500 mg daily.  No diuretic added as clinically he does not appear to be fluid overloaded.  Precath labs can be done at hospital.  Further recommendations once cath is complete.    Adrian was seen today for follow-up, chest pain and shortness of breath.    Diagnoses and all orders for this visit:    Coronary artery disease of native artery of native heart with stable angina pectoris    Ischemic cardiomyopathy    ICD (implantable cardioverter-defibrillator) in place    Congestive heart failure with LV diastolic dysfunction, NYHA class 2    Hypercholesteremia    Type 2 diabetes mellitus with other circulatory complication, with long-term current use of insulin    Stable angina pectoris    Shortness of breath                    Electronically signed by JONH Moran,  May 2, 2018 11:58 AM

## 2018-05-02 NOTE — TELEPHONE ENCOUNTER
Mr. Samuel was here today for fu and BiV/ICD check.  Last stress in 2017 with lateral ischemia, EF remains low.  Nitro patch increased with plan for cath if more symptoms.  Cannot tolerate Ranexa or Imdur.     He is more short of breath, chest pain relieved by sl nitro, using daily.  He would like to proceed with cath and has requested radial approach.  Should I refer to Dr. Fuentes?

## 2018-05-03 NOTE — TELEPHONE ENCOUNTER
I spoke with patient's wife, Jessica.  Patient does want to proceed with radial approach cardiac catheterization.  I did explain to Jessica that there is a chance it will have to be femoral.    Consult scheduled with Dr. Salcido on 5/23/18 at 9 am.  Records faxed to Dr. Salcido's office.

## 2018-07-02 ENCOUNTER — OFFICE VISIT (OUTPATIENT)
Dept: CARDIOLOGY | Facility: CLINIC | Age: 63
End: 2018-07-02

## 2018-07-02 VITALS
BODY MASS INDEX: 30.21 KG/M2 | HEART RATE: 70 BPM | DIASTOLIC BLOOD PRESSURE: 64 MMHG | WEIGHT: 211 LBS | SYSTOLIC BLOOD PRESSURE: 110 MMHG | HEIGHT: 70 IN

## 2018-07-02 DIAGNOSIS — I25.5 ISCHEMIC CARDIOMYOPATHY: ICD-10-CM

## 2018-07-02 DIAGNOSIS — I50.30 CONGESTIVE HEART FAILURE WITH LV DIASTOLIC DYSFUNCTION, NYHA CLASS 2 (HCC): ICD-10-CM

## 2018-07-02 DIAGNOSIS — I25.118 CORONARY ARTERY DISEASE OF NATIVE ARTERY OF NATIVE HEART WITH STABLE ANGINA PECTORIS (HCC): Primary | ICD-10-CM

## 2018-07-02 DIAGNOSIS — Z95.810 ICD (IMPLANTABLE CARDIOVERTER-DEFIBRILLATOR) IN PLACE: ICD-10-CM

## 2018-07-02 DIAGNOSIS — Z78.9 PACED RHYTHM ON ELECTROCARDIOGRAM (ECG): ICD-10-CM

## 2018-07-02 DIAGNOSIS — E78.00 HYPERCHOLESTEREMIA: ICD-10-CM

## 2018-07-02 DIAGNOSIS — Z79.899 MEDICATION MANAGEMENT: ICD-10-CM

## 2018-07-02 DIAGNOSIS — R53.83 OTHER FATIGUE: ICD-10-CM

## 2018-07-02 PROCEDURE — 93000 ELECTROCARDIOGRAM COMPLETE: CPT | Performed by: NURSE PRACTITIONER

## 2018-07-02 PROCEDURE — 99213 OFFICE O/P EST LOW 20 MIN: CPT | Performed by: NURSE PRACTITIONER

## 2018-07-02 NOTE — PROGRESS NOTES
"Chief Complaint   Patient presents with   • Follow-up     Patient had cath with stenting per Dr. Fuentes on 05/29/18. Reports CP may be slightly improved but still has CP. PCP refills meds. Pt brought note from pharmacy that states \"insurance says fenofibrate is a suboptimal drug and reccomends atorvastatin only\". Pt still taking at this time.    • Device Check     [facility] BiV ICD last checked on 05/02/18.        Subjective       Adrian Samuel is a 62 y.o. male with history of HTN, hyperlipidemia, IDDM, and complex cardiac problems for which he has had PTCA, bypass, and numerous caths in the past. A biventricular ICD placed in 2012. His last cardiac cath was in March of 2015. The cath showed a lesion in the LAD after the LIMA and he had stenting of the same. Echo done in September 2015 showed his EF 30-35% with moderate to severe MR. He was supposed to had started Entresto, but apparently did not. He had been taking just losartan. In 2015, he was referred back to  heart failure clinic. Entresto restarted. In March 2017, repeat Lexiscan stress showed lateral wall ischemia. The dose of nitrodur patch increased. Echocardiogram showed LV function remained low with EF 35-40%. Cath recommended if symptoms persisted. On 5/2/18, he returned for follow up visit and to have his device interrogated. He reported more short of breath and chest pain, worse with exertion and relieved by 1 to 3 sl nitro. BiV/ICD interrogation reported 5/2/18 was normal. A cardiac cath was recommended. He was referred to Dr. Fuentes for radial approach. LIMA was patent. Proximal SVG to PDA had stenosis and 3.5x30 Resolute stent was placed. LVEF 35% ntoed.   Today he comes to the office for a follow up visit. He continues to have mild chest pain, some better since last cath. The chest discomfort most often occurs in evening when \"feeling tired\" and subsides after sitting down to rest. He admits to using nitro SL a few times, not nearly as often as " before heart cath.       HPI     Cardiac History:    Past Surgical History:   Procedure Laterality Date   • CARDIAC CATHETERIZATION  1993    Cath-PTCA.   • CARDIAC CATHETERIZATION  02/08/2005    Cath-() Patent Grafts   • CARDIAC CATHETERIZATION  08/01/2005    Cath-(Dr. Sigala) Patent Grafts.   • CARDIAC CATHETERIZATION  09/18/2007    Cath-100%LAD, 80%D2. 100%LCX, RCA. Patent Lima & SVG to PDA.   • CARDIAC CATHETERIZATION  08/22/2006    Cath-Patent Grafts   • CARDIAC CATHETERIZATION  01/15/2010    Cath-Patent LIMA & SVG to PDA. 80% OM1-2.25 x 16mm Taxus Stent   • CARDIAC CATHETERIZATION  02/14/2012    Cath-IVUS LAD 60-70%, 2.55 x 20mm Promus stent Ramus   • CARDIAC CATHETERIZATION  03/12/2013    Cath-3.5 x 28Promus stent SVG to RCA.   • CARDIAC CATHETERIZATION  11/25/2013    Cath-(Dr. Sigala) EF30-35%, occluded circ and graft but increase collaterol flow manage medically.   • CARDIAC CATHETERIZATION  12/22/2014    Cath-85% SVG to PDA-   • CARDIAC CATHETERIZATION  03/18/2015    Cath-90%LIMA to LAD-80% SVG to PDA-2.75 x 8 Resolute   • CARDIAC DEFIBRILLATOR PLACEMENT  2012    BiV/ICD, S[facility]   • CARDIOVASCULAR STRESS TEST  02/19/2008    D.Myoview-75%THR. EF 43%. InferoLateral Infarct with Nba-Infarct Ischemia   • CARDIOVASCULAR STRESS TEST  05/08/2009    D.Myoview-78%THR. EF48%. Inferiolateral wall infarct and nba-infarct ischemia.   • CARDIOVASCULAR STRESS TEST  03/03/2010    L.Myoview-Lateral Ischemia   • CARDIOVASCULAR STRESS TEST  02/13/2013    L.Myoview-lateral wall ischemia   • CARDIOVASCULAR STRESS TEST  11/11/2013    L.Myoview-lateral wall ischemia   • CARDIOVASCULAR STRESS TEST  11/18/2014    L.Myoview-([facility]) EF. Inferolateral Infarct   • CARDIOVASCULAR STRESS TEST  03/17/2015    L.Myoview-Sig Anterior Changes   • CARDIOVASCULAR STRESS TEST  03/15/2017    Lexiscan- lateral wall ischemia, nitrodur increased, cath if symptoms persist   • CAROTID ARTERY - SUBCLAVIAN ARTERY BYPASS GRAFT  04/10/1989     Carotid US-No sig. stenosis noted   • CONVERTED (HISTORICAL) CARDIOVASCULAR STUDIES  09/05/2012    MUGA-RVEF 41%, LVEF 38%   • CONVERTED (HISTORICAL) HOLTER  06/11/2006    Holter-PVC's noted   • CORONARY ARTERY BYPASS GRAFT  1995    CABG LIMA TO LAD, SVG TO OM and SVG to PDA.   • ECHO - CONVERTED  08/29/2007    Echo-EF50-55%, mild to mod. MR, mildly hypokinetic septum.   • ECHO - CONVERTED  05/08/2009    Echo-EF45-49%, Moderate MR and PA pressure-42 mmHg.   • ECHO - CONVERTED  01/18/2012    Echo-EF 25-30%   • ECHO - CONVERTED  05/17/2012    Echo-EF<30%. AnteroSpectal WMA.   • ECHO - CONVERTED  09/02/2015    Echo-EF 30-35%, mod to severe MR   • ECHO - CONVERTED  03/15/2017    EF 35-40%, RVSP 25-30 mmHg   • OTHER SURGICAL HISTORY  02/28/2014    U/S Aorta-   • US CAROTID UNILATERAL  05/21/2015    Carotid US-no hemodyanamically significant stenosis       Current Outpatient Prescriptions   Medication Sig Dispense Refill   • aspirin 81 MG EC tablet Take 81 mg by mouth daily.     • atorvastatin (LIPITOR) 80 MG tablet Take 80 mg by mouth Daily.     • baclofen (LIORESAL) 10 MG tablet Take 10 mg by mouth Daily As Needed for Muscle Spasms.     • carbidopa-levodopa (SINEMET)  MG per tablet Take 2 tablets by mouth 2 (Two) Times a Day.     • cetirizine (ZyrTEC) 10 MG tablet Take 10 mg by mouth daily.     • citalopram (CeleXA) 40 MG tablet Take 40 mg by mouth daily.     • fenofibrate (TRICOR) 145 MG tablet Take 145 mg by mouth Daily.     • HYDROcodone-acetaminophen (NORCO)  MG per tablet Take 1 tablet by mouth Every 6 (Six) Hours As Needed for Moderate Pain .     • Insulin Aspart Prot & Aspart (NOVOLOG MIX 70/30 PENFILL SC) Inject  under the skin.     • nebivolol (BYSTOLIC) 5 MG tablet Take 5 mg by mouth Daily.     • nitroglycerin (NITRODUR) 0.6 MG/HR patch Place 1 patch on the skin Daily. 30 patch 8   • nitroglycerin (NITROSTAT) 0.4 MG SL tablet Place 0.4 mg under the tongue every 5 (five) minutes as needed for chest  pain. Take no more than 3 doses in 15 minutes.     • O2 (OXYGEN) Inhale 2 L/min at night as needed.     • ondansetron (ZOFRAN) 4 MG tablet Take 4 mg by mouth 3 (three) times a day as needed for nausea or vomiting.     • pantoprazole (PROTONIX) 40 MG EC tablet Take 40 mg by mouth daily.     • polyethylene glycol (MIRALAX) packet Take 17 g by mouth daily as needed.     • pregabalin (LYRICA) 75 MG capsule Take 75 mg by mouth Every Night.     • sacubitril-valsartan (ENTRESTO) 24-26 MG tablet Take 1 tablet by mouth 2 (Two) Times a Day. 28 tablet 0   • ticagrelor (BRILINTA) 90 MG tablet tablet Take 90 mg by mouth 2 (Two) Times a Day.     • vitamin D (ERGOCALCIFEROL) 29888 UNITS capsule capsule Take 50,000 Units by mouth 1 (one) time per week.       No current facility-administered medications for this visit.        Effient [prasugrel]; Imdur [isosorbide dinitrate]; Livalo [pitavastatin]; and Ranolazine    Past Medical History:   Diagnosis Date   • Disease of thyroid gland 04/10/2009    small colloid cyst right thyroid   • Esophageal dilatation     Followed by Dr. Kyle   • Heel spurs removed    • Hypercholesterolemia    • Hypertension     Systemic   • IHD (ischemic heart disease)    • Other abnormal cytological findings on specimens from anus 2006 & 2007    EECP   • Pain     Chronic       Social History     Social History   • Marital status:      Spouse name: N/A   • Number of children: N/A   • Years of education: N/A     Occupational History   • Not on file.     Social History Main Topics   • Smoking status: Former Smoker     Quit date: 1995   • Smokeless tobacco: Current User     Types: Chew      Comment: 11/24/2015   • Alcohol use No   • Drug use: Unknown   • Sexual activity: Not on file     Other Topics Concern   • Not on file     Social History Narrative   • No narrative on file       Family History   Problem Relation Age of Onset   • Heart disease Mother    • Stroke Mother    • Diabetes Mother    • Diabetes  "Father    • Cirrhosis Father    • Heart disease Other    • Diabetes Other        Review of Systems   Constitution: Positive for weakness and malaise/fatigue. Negative for decreased appetite.   HENT: Negative for congestion, hoarse voice and nosebleeds.    Eyes: Positive for visual disturbance (not a new symptom). Negative for pain and redness.   Cardiovascular: Positive for chest pain and leg swelling (mild, not a new symptom). Negative for near-syncope and palpitations.   Respiratory: Positive for shortness of breath and sleep disturbances due to breathing (uses oxygen). Negative for cough and sputum production.    Endocrine: Positive for cold intolerance and heat intolerance.   Hematologic/Lymphatic: Negative for bleeding problem. Bruises/bleeds easily.   Skin: Positive for dry skin. Negative for itching.   Musculoskeletal: Positive for joint pain and myalgias. Negative for falls.   Gastrointestinal: Negative for change in bowel habit, heartburn, melena and nausea.   Genitourinary: Negative for dysuria and hematuria.   Neurological: Positive for light-headedness. Negative for headaches.   Psychiatric/Behavioral: Negative for memory loss. The patient is nervous/anxious (helping to take care of wife). The patient does not have insomnia.         Objective     /64   Pulse 70   Ht 177.8 cm (70\")   Wt 95.7 kg (211 lb)   BMI 30.28 kg/m²     Physical Exam   Constitutional: He is oriented to person, place, and time. Vital signs are normal. He appears well-nourished. No distress.   HENT:   Head: Normocephalic.   Eyes: Conjunctivae are normal. Pupils are equal, round, and reactive to light.   Neck: Normal range of motion. Neck supple. No JVD present. Carotid bruit is not present.   Cardiovascular: Normal rate, regular rhythm, S1 normal and S2 normal.    Murmur heard.  Pulses:       Radial pulses are 2+ on the right side, and 2+ on the left side.   Pulmonary/Chest: Breath sounds normal. He has no wheezes. He has no " rales.   Abdominal: Soft. Bowel sounds are normal. There is no tenderness.   Musculoskeletal: Normal range of motion. He exhibits edema (trace lower legs).   Neurological: He is alert and oriented to person, place, and time.   Skin: Skin is warm and dry. There is pallor (slight, normally has pale appearance).   Psychiatric: He has a normal mood and affect. His behavior is normal.          ECG 12 Lead  Date/Time: 7/2/2018 5:19 PM  Performed by: PRISCILA RICH  Authorized by: PRISCILA RICH   Comparison: compared with previous ECG from 3/1/2017  Similar to previous ECG  Comparison to previous ECG: Atrial and ventricular paced  Rhythm: paced  BPM: 70            Assessment/Plan      Adrian was seen today for follow-up and device check.    Diagnoses and all orders for this visit:    Coronary artery disease of native artery of native heart with stable angina pectoris    Paced rhythm on electrocardiogram (ECG)    ICD (implantable cardioverter-defibrillator) in place    Hypercholesteremia    Ischemic cardiomyopathy    Congestive heart failure with LV diastolic dysfunction, NYHA class 2 (CMS/HCC)    Other fatigue    Medication management    Other orders  -     ECG 12 Lead     EKG for ICD management today shows atrial and ventricular pacing. His blood pressure is stable. Advised to continue Entresto at same dose. A [facility] Bi V ICD check interrogation scheduled for November.    Recent cardiac cath report reviewed. He is taking Brilinta and understands importance to take routinely. He continues to have chest pain but improved and appears stable at this time. He was not able to tolerate Ranexa in the past. Continue Nitropatch and he has nitrostat that is up to date. I did not reorder cardiac workup at this time.     Patient's Body mass index is 30.28 kg/m². BMI is above normal parameters. Recommendations include: nutrition counseling. Diabetic diet encouraged.     For lipid management, he is taking statin and fenofibrate. We  will continue the same at this time. He will follow with you for lab order. Please forward results as available.     A 6 month follow up scheduled.            Electronically signed by JONH Nolan,  July 2, 2018 5:25 PM

## 2018-11-07 ENCOUNTER — TELEPHONE (OUTPATIENT)
Dept: CARDIOLOGY | Facility: CLINIC | Age: 63
End: 2018-11-07

## 2018-11-07 ENCOUNTER — OFFICE VISIT (OUTPATIENT)
Dept: CARDIOLOGY | Facility: CLINIC | Age: 63
End: 2018-11-07

## 2018-11-07 VITALS
HEIGHT: 70 IN | WEIGHT: 213.38 LBS | OXYGEN SATURATION: 99 % | DIASTOLIC BLOOD PRESSURE: 78 MMHG | BODY MASS INDEX: 30.55 KG/M2 | HEART RATE: 73 BPM | SYSTOLIC BLOOD PRESSURE: 120 MMHG

## 2018-11-07 DIAGNOSIS — I50.30 CONGESTIVE HEART FAILURE WITH LV DIASTOLIC DYSFUNCTION, NYHA CLASS 2 (HCC): Primary | ICD-10-CM

## 2018-11-07 DIAGNOSIS — I20.0 UNSTABLE ANGINA PECTORIS (HCC): ICD-10-CM

## 2018-11-07 DIAGNOSIS — Z95.810 ICD (IMPLANTABLE CARDIOVERTER-DEFIBRILLATOR) IN PLACE: ICD-10-CM

## 2018-11-07 DIAGNOSIS — R06.02 SHORTNESS OF BREATH: ICD-10-CM

## 2018-11-07 DIAGNOSIS — I25.5 ISCHEMIC CARDIOMYOPATHY: ICD-10-CM

## 2018-11-07 DIAGNOSIS — R00.2 PALPITATIONS: ICD-10-CM

## 2018-11-07 DIAGNOSIS — E87.79 OTHER HYPERVOLEMIA: ICD-10-CM

## 2018-11-07 PROCEDURE — 93284 PRGRMG EVAL IMPLANTABLE DFB: CPT | Performed by: INTERNAL MEDICINE

## 2018-11-07 PROCEDURE — 93290 INTERROG DEV EVAL ICPMS IP: CPT | Performed by: INTERNAL MEDICINE

## 2018-11-07 PROCEDURE — 99214 OFFICE O/P EST MOD 30 MIN: CPT | Performed by: NURSE PRACTITIONER

## 2018-11-07 RX ORDER — SPIRONOLACTONE 25 MG/1
25 TABLET ORAL DAILY
Qty: 90 TABLET | Refills: 3 | Status: SHIPPED | OUTPATIENT
Start: 2018-11-07 | End: 2019-06-05 | Stop reason: SDUPTHER

## 2018-11-07 NOTE — TELEPHONE ENCOUNTER
I forgot to put [facility] ICD check to be done in 6 months in his wrap up. Please schedule and inform patient. Thanks.

## 2018-11-07 NOTE — PROGRESS NOTES
"Chief Complaint   Patient presents with   • Follow-up     For cardiac management . PPM checked today.    • Chest Pain     Chest pain for the past 2-3 days, takes nitro tabs, wife states that he has to take several during an episode.    • Palpitations     Palpitations quite a bit yesterday.   • Shortness of Breath     Shortness of breath upon walking to mailbox . Tired all the time said that it took him 2 hours to get to the bedroom..   • Med Refill     PCP refills medications.. No cardiac meds needing refills at this time.       Subjective       Adrian Samuel is a 63 y.o. male with history of HTN, hyperlipidemia, IDDM, and complex cardiac problems for which he has had PTCA, bypass, and numerous caths in the past. A biventricular ICD placed in 2012. His last cardiac cath was in March of 2015. The cath showed a lesion in the LAD after the LIMA and he had stenting of the same. Echo done in September 2015 showed his EF 30-35% with moderate to severe MR. He was supposed to had started Entresto, but apparently did not. He had been taking just losartan. In 2015, he was referred back to  heart failure clinic. Entresto restarted. In March 2017, repeat Lexiscan stress showed lateral wall ischemia. The dose of nitrodur patch increased. Echocardiogram showed LV function remained low with EF 35-40%. Cath recommended if symptoms persisted. On 5/2/18, he returned for follow up visit and to have his device interrogated. He reported more short of breath and chest pain, worse with exertion and relieved by 1 to 3 sl nitro. BiV/ICD interrogation reported 5/2/18 was normal. A cardiac cath was recommended. He was referred to Dr. Fuentes for radial approach. LIMA was patent. Proximal SVG to PDA had stenosis and 3.5x30 Resolute stent was placed. LVEF 35% noted.     Today he comes to the office for a follow up visit accompanied by his wife. Patient admits a lot of \"indigestion\", more shortness of breath and fatigue. He is taking " "nitrostat more often for chest pain. His wife states, \"he is a lot worse lately\". Some days he has more swelling in his lower legs than others.     HPI     Cardiac History:    Past Surgical History:   Procedure Laterality Date   • CARDIAC CATHETERIZATION  1993    Cath-PTCA.   • CARDIAC CATHETERIZATION  02/08/2005    Cath-() Patent Grafts   • CARDIAC CATHETERIZATION  08/01/2005    Cath-(Dr. Sigala) Patent Grafts.   • CARDIAC CATHETERIZATION  09/18/2007    Cath-100%LAD, 80%D2. 100%LCX, RCA. Patent Lima & SVG to PDA.   • CARDIAC CATHETERIZATION  08/22/2006    Cath-Patent Grafts   • CARDIAC CATHETERIZATION  01/15/2010    Cath-Patent LIMA & SVG to PDA. 80% OM1-2.25 x 16mm Taxus Stent   • CARDIAC CATHETERIZATION  02/14/2012    Cath-IVUS LAD 60-70%, 2.55 x 20mm Promus stent Ramus   • CARDIAC CATHETERIZATION  03/12/2013    Cath-3.5 x 28Promus stent SVG to RCA.   • CARDIAC CATHETERIZATION  11/25/2013    Cath-(Dr. Sigala) EF30-35%, occluded circ and graft but increase collaterol flow manage medically.   • CARDIAC CATHETERIZATION  12/22/2014    Cath-85% SVG to PDA-   • CARDIAC CATHETERIZATION  03/18/2015    Cath-90%LIMA to LAD-80% SVG to PDA-2.75 x 8 Resolute   • CARDIAC DEFIBRILLATOR PLACEMENT  2012    BiV/ICD, YAMILETM   • CARDIOVASCULAR STRESS TEST  02/19/2008    D.Myoview-75%THR. EF 43%. InferoLateral Infarct with Nba-Infarct Ischemia   • CARDIOVASCULAR STRESS TEST  05/08/2009    D.Myoview-78%THR. EF48%. Inferiolateral wall infarct and nba-infarct ischemia.   • CARDIOVASCULAR STRESS TEST  03/03/2010    L.Myoview-Lateral Ischemia   • CARDIOVASCULAR STRESS TEST  02/13/2013    L.Myoview-lateral wall ischemia   • CARDIOVASCULAR STRESS TEST  11/11/2013    L.Myoview-lateral wall ischemia   • CARDIOVASCULAR STRESS TEST  11/18/2014    L.Myoview-([facility]) EF. Inferolateral Infarct   • CARDIOVASCULAR STRESS TEST  03/17/2015    L.Myoview-Sig Anterior Changes   • CARDIOVASCULAR STRESS TEST  03/15/2017    Lexiscan- lateral wall " ischemia, nitrodur increased, cath if symptoms persist   • CAROTID ARTERY - SUBCLAVIAN ARTERY BYPASS GRAFT  04/10/1989    Carotid US-No sig. stenosis noted   • CONVERTED (HISTORICAL) CARDIOVASCULAR STUDIES  09/05/2012    MUGA-RVEF 41%, LVEF 38%   • CONVERTED (HISTORICAL) HOLTER  06/11/2006    Holter-PVC's noted   • CORONARY ARTERY BYPASS GRAFT  1995    CABG LIMA TO LAD, SVG TO OM and SVG to PDA.   • ECHO - CONVERTED  08/29/2007    Echo-EF50-55%, mild to mod. MR, mildly hypokinetic septum.   • ECHO - CONVERTED  05/08/2009    Echo-EF45-49%, Moderate MR and PA pressure-42 mmHg.   • ECHO - CONVERTED  01/18/2012    Echo-EF 25-30%   • ECHO - CONVERTED  05/17/2012    Echo-EF<30%. AnteroSpectal WMA.   • ECHO - CONVERTED  09/02/2015    Echo-EF 30-35%, mod to severe MR   • ECHO - CONVERTED  03/15/2017    EF 35-40%, RVSP 25-30 mmHg   • OTHER SURGICAL HISTORY  02/28/2014    U/S Aorta-   • US CAROTID UNILATERAL  05/21/2015    Carotid US-no hemodyanamically significant stenosis       Current Outpatient Prescriptions   Medication Sig Dispense Refill   • aspirin 81 MG EC tablet Take 81 mg by mouth daily.     • atorvastatin (LIPITOR) 80 MG tablet Take 80 mg by mouth Daily.     • baclofen (LIORESAL) 10 MG tablet Take 10 mg by mouth Daily As Needed for Muscle Spasms.     • carbidopa-levodopa (SINEMET)  MG per tablet Take 2 tablets by mouth 2 (Two) Times a Day.     • cetirizine (ZyrTEC) 10 MG tablet Take 10 mg by mouth daily.     • citalopram (CeleXA) 40 MG tablet Take 40 mg by mouth daily.     • fenofibrate (TRICOR) 145 MG tablet Take 145 mg by mouth Daily.     • HYDROcodone-acetaminophen (NORCO)  MG per tablet Take 1 tablet by mouth Every 6 (Six) Hours As Needed for Moderate Pain .     • Insulin Aspart Prot & Aspart (NOVOLOG MIX 70/30 PENFILL SC) Inject  under the skin.     • nebivolol (BYSTOLIC) 5 MG tablet Take 5 mg by mouth Daily.     • nitroglycerin (NITRODUR) 0.6 MG/HR patch Place 1 patch on the skin Daily. 30 patch  8   • nitroglycerin (NITROSTAT) 0.4 MG SL tablet Place 0.4 mg under the tongue every 5 (five) minutes as needed for chest pain. Take no more than 3 doses in 15 minutes.     • O2 (OXYGEN) Inhale 2 L/min at night as needed.     • ondansetron (ZOFRAN) 4 MG tablet Take 4 mg by mouth 3 (three) times a day as needed for nausea or vomiting.     • pantoprazole (PROTONIX) 40 MG EC tablet Take 40 mg by mouth daily.     • polyethylene glycol (MIRALAX) packet Take 17 g by mouth daily as needed.     • pregabalin (LYRICA) 75 MG capsule Take 75 mg by mouth Every Night.     • sacubitril-valsartan (ENTRESTO) 24-26 MG tablet Take 1 tablet by mouth 2 (Two) Times a Day. 28 tablet 0   • SITagliptin (JANUVIA) 25 MG tablet Take 25 mg by mouth Daily.     • ticagrelor (BRILINTA) 90 MG tablet tablet Take 90 mg by mouth 2 (Two) Times a Day.     • vitamin D (ERGOCALCIFEROL) 70106 UNITS capsule capsule Take 50,000 Units by mouth 1 (one) time per week.     • spironolactone (ALDACTONE) 25 MG tablet Take 1 tablet by mouth Daily. 90 tablet 3     No current facility-administered medications for this visit.        Effient [prasugrel]; Imdur [isosorbide dinitrate]; Livalo [pitavastatin]; and Ranolazine    Past Medical History:   Diagnosis Date   • Disease of thyroid gland 04/10/2009    small colloid cyst right thyroid   • Esophageal dilatation     Followed by Dr. Kyle   • Heel spurs removed    • Hypercholesterolemia    • Hypertension     Systemic   • IHD (ischemic heart disease)    • Other abnormal cytological findings on specimens from anus 2006 & 2007    EECP   • Pain     Chronic       Social History     Social History   • Marital status:      Spouse name: N/A   • Number of children: N/A   • Years of education: N/A     Occupational History   • Not on file.     Social History Main Topics   • Smoking status: Former Smoker     Quit date: 1995   • Smokeless tobacco: Current User     Types: Chew      Comment: 11/24/2015   • Alcohol use No   •  "Drug use: Unknown   • Sexual activity: Not on file     Other Topics Concern   • Not on file     Social History Narrative   • No narrative on file       Family History   Problem Relation Age of Onset   • Heart disease Mother    • Stroke Mother    • Diabetes Mother    • Diabetes Father    • Cirrhosis Father    • Heart disease Other    • Diabetes Other        Review of Systems   Constitution: Positive for weakness and malaise/fatigue.   HENT: Negative for congestion, hoarse voice and nosebleeds.    Eyes: Negative for redness and visual disturbance.   Cardiovascular: Positive for chest pain, dyspnea on exertion, leg swelling and palpitations. Negative for near-syncope.   Respiratory: Positive for cough and shortness of breath. Negative for sputum production.    Hematologic/Lymphatic: Negative for bleeding problem. Bruises/bleeds easily.   Skin: Negative for dry skin and itching.   Musculoskeletal: Positive for joint pain, myalgias, neck pain and stiffness. Negative for falls.   Gastrointestinal: Positive for flatus (\"feels like I got gas on my stomach\") and heartburn. Negative for change in bowel habit, dysphagia and melena.   Genitourinary: Negative for dysuria and hematuria.   Neurological: Positive for dizziness and headaches. Negative for loss of balance.   Psychiatric/Behavioral: Negative for memory loss. The patient is nervous/anxious.         Objective     /78   Pulse 73   Ht 177.8 cm (70\")   Wt 96.8 kg (213 lb 6 oz)   SpO2 99%   BMI 30.62 kg/m²     Physical Exam   Constitutional: He is oriented to person, place, and time. He appears well-nourished. No distress.   HENT:   Head: Normocephalic.   Eyes: Pupils are equal, round, and reactive to light. Conjunctivae are normal.   Neck: Normal range of motion. Neck supple. Carotid bruit is not present.   Cardiovascular: Normal rate, regular rhythm, S1 normal and S2 normal.    Murmur heard.  Pulses:       Radial pulses are 2+ on the right side, and 2+ on the " left side.   Pulmonary/Chest: Breath sounds normal.   Abdominal: Soft. Bowel sounds are normal. He exhibits no distension. There is no tenderness.   Musculoskeletal: Normal range of motion. He exhibits edema (trace in lower legs) and tenderness (mildly).   Neurological: He is alert and oriented to person, place, and time.   Skin: Skin is warm and dry. There is pallor (mildly).   Psychiatric: He has a normal mood and affect. His behavior is normal.      Procedures: none today      Assessment/Plan      Adrian was seen today for follow-up, chest pain, palpitations, shortness of breath and med refill.    Diagnoses and all orders for this visit:    Congestive heart failure with LV diastolic dysfunction, NYHA class 2 (CMS/HCC)  -     Stress Test With Myocardial Perfusion One Day; Future  -     Adult Transthoracic Echo Complete W/ Cont if Necessary Per Protocol; Future    Ischemic cardiomyopathy  -     Stress Test With Myocardial Perfusion One Day; Future  -     Adult Transthoracic Echo Complete W/ Cont if Necessary Per Protocol; Future    ICD (implantable cardioverter-defibrillator) in place    Unstable angina pectoris (CMS/[facility])  -     Stress Test With Myocardial Perfusion One Day; Future  -     Adult Transthoracic Echo Complete W/ Cont if Necessary Per Protocol; Future    Other hypervolemia  -     spironolactone (ALDACTONE) 25 MG tablet; Take 1 tablet by mouth Daily.    Palpitations  -     Stress Test With Myocardial Perfusion One Day; Future  -     Adult Transthoracic Echo Complete W/ Cont if Necessary Per Protocol; Future    Shortness of breath  -     Stress Test With Myocardial Perfusion One Day; Future  -     Adult Transthoracic Echo Complete W/ Cont if Necessary Per Protocol; Future  -     spironolactone (ALDACTONE) 25 MG tablet; Take 1 tablet by mouth Daily.      ICD interrogation today ndicated fluid overload. He admits to more shortness of breath, mild swelling of lower legs,  fatigue and chest pain. His blood  pressure is stable. Aldactone 25 mg daily added. Continue same dose Entresto. He was intolerant to Imdur or Ranexa in the past.     Due to worsening symptoms suggestive of ischemia and heart failure, repeat nuclear stress test and echocardiogram ordered.     Patient's Body mass index is 30.62 kg/m². BMI is above normal parameters. Recommendations include: nutrition counseling. Diabetic and DASH diets encouraged.      Further recommendations based on test results. An ICD interrogation scheduled to be done in 6 months.            Electronically signed by JONH Nolan,  November 7, 2018 4:55 PM

## 2018-11-13 ENCOUNTER — HOSPITAL ENCOUNTER (OUTPATIENT)
Dept: CARDIOLOGY | Facility: HOSPITAL | Age: 63
Discharge: HOME | End: 2018-11-13

## 2018-11-13 DIAGNOSIS — I25.5 ISCHEMIC CARDIOMYOPATHY: ICD-10-CM

## 2018-11-13 DIAGNOSIS — R00.2 PALPITATIONS: ICD-10-CM

## 2018-11-13 DIAGNOSIS — I50.30 CONGESTIVE HEART FAILURE WITH LV DIASTOLIC DYSFUNCTION, NYHA CLASS 2 (HCC): ICD-10-CM

## 2018-11-13 DIAGNOSIS — R06.02 SHORTNESS OF BREATH: ICD-10-CM

## 2018-11-13 DIAGNOSIS — I20.0 UNSTABLE ANGINA PECTORIS (HCC): ICD-10-CM

## 2018-11-13 LAB
BH CV ECHO MEAS - ACS: 2 CM
BH CV ECHO MEAS - AO MEAN PG: 5.2 MMHG
BH CV ECHO MEAS - AO ROOT AREA (BSA CORRECTED): 1.5
BH CV ECHO MEAS - AO ROOT AREA: 8 CM^2
BH CV ECHO MEAS - AO ROOT DIAM: 3.2 CM
BH CV ECHO MEAS - AO V2 MEAN: 105.9 CM/SEC
BH CV ECHO MEAS - AO V2 VTI: 30.8 CM
BH CV ECHO MEAS - BSA(HAYCOCK): 2.2 M^2
BH CV ECHO MEAS - BSA: 2.1 M^2
BH CV ECHO MEAS - BZI_BMI: 30.6 KILOGRAMS/M^2
BH CV ECHO MEAS - BZI_METRIC_HEIGHT: 177.8 CM
BH CV ECHO MEAS - BZI_METRIC_WEIGHT: 96.6 KG
BH CV ECHO MEAS - EDV(CUBED): 184.1 ML
BH CV ECHO MEAS - EDV(MOD-SP4): 158 ML
BH CV ECHO MEAS - EDV(TEICH): 159.3 ML
BH CV ECHO MEAS - EF(CUBED): 78.6 %
BH CV ECHO MEAS - EF(MOD-SP4): 37.3 %
BH CV ECHO MEAS - EF(TEICH): 70.1 %
BH CV ECHO MEAS - ESV(CUBED): 39.5 ML
BH CV ECHO MEAS - ESV(MOD-SP4): 99 ML
BH CV ECHO MEAS - ESV(TEICH): 47.6 ML
BH CV ECHO MEAS - FS: 40.2 %
BH CV ECHO MEAS - IVS/LVPW: 0.92
BH CV ECHO MEAS - IVSD: 1.2 CM
BH CV ECHO MEAS - LA DIMENSION: 4.2 CM
BH CV ECHO MEAS - LA/AO: 1.3
BH CV ECHO MEAS - LV DIASTOLIC VOL/BSA (35-75): 73.7 ML/M^2
BH CV ECHO MEAS - LV IVRT: 0.12 SEC
BH CV ECHO MEAS - LV MASS(C)D: 315.3 GRAMS
BH CV ECHO MEAS - LV MASS(C)DI: 147 GRAMS/M^2
BH CV ECHO MEAS - LV SYSTOLIC VOL/BSA (12-30): 46.2 ML/M^2
BH CV ECHO MEAS - LVIDD: 5.7 CM
BH CV ECHO MEAS - LVIDS: 3.4 CM
BH CV ECHO MEAS - LVLD AP4: 7.9 CM
BH CV ECHO MEAS - LVLS AP4: 6.9 CM
BH CV ECHO MEAS - LVOT AREA (M): 3.5 CM^2
BH CV ECHO MEAS - LVOT AREA: 3.6 CM^2
BH CV ECHO MEAS - LVOT DIAM: 2.1 CM
BH CV ECHO MEAS - LVPWD: 1.3 CM
BH CV ECHO MEAS - MV A MAX VEL: 90.3 CM/SEC
BH CV ECHO MEAS - MV DEC SLOPE: 301.8 CM/SEC^2
BH CV ECHO MEAS - MV E MAX VEL: 81.4 CM/SEC
BH CV ECHO MEAS - MV E/A: 0.9
BH CV ECHO MEAS - MV MEAN PG: 2 MMHG
BH CV ECHO MEAS - MV P1/2T MAX VEL: 95 CM/SEC
BH CV ECHO MEAS - MV P1/2T: 92.2 MSEC
BH CV ECHO MEAS - MV V2 MEAN: 65.8 CM/SEC
BH CV ECHO MEAS - MV V2 VTI: 32.7 CM
BH CV ECHO MEAS - MVA P1/2T LCG: 2.3 CM^2
BH CV ECHO MEAS - MVA(P1/2T): 2.4 CM^2
BH CV ECHO MEAS - RAP SYSTOLE: 10 MMHG
BH CV ECHO MEAS - RVDD: 2.8 CM
BH CV ECHO MEAS - RVSP: 30.3 MMHG
BH CV ECHO MEAS - SI(AO): 114 ML/M^2
BH CV ECHO MEAS - SI(CUBED): 67.5 ML/M^2
BH CV ECHO MEAS - SI(MOD-SP4): 27.5 ML/M^2
BH CV ECHO MEAS - SI(TEICH): 52.1 ML/M^2
BH CV ECHO MEAS - SV(AO): 244.5 ML
BH CV ECHO MEAS - SV(CUBED): 144.6 ML
BH CV ECHO MEAS - SV(MOD-SP4): 59 ML
BH CV ECHO MEAS - SV(TEICH): 111.7 ML
BH CV ECHO MEAS - TR MAX VEL: 225.3 CM/SEC
BH CV NUCLEAR PRIOR STUDY: 3
BH CV STRESS COMMENTS STAGE 1: NORMAL
BH CV STRESS DOSE REGADENOSON STAGE 1: 0.4
BH CV STRESS DURATION MIN STAGE 1: 0
BH CV STRESS DURATION SEC STAGE 1: 10
BH CV STRESS PROTOCOL 1: NORMAL
BH CV STRESS RECOVERY BP: NORMAL MMHG
BH CV STRESS RECOVERY HR: 73 BPM
BH CV STRESS STAGE 1: 1
LV EF NUC BP: 34 %
MAXIMAL PREDICTED HEART RATE: 156 BPM
MAXIMAL PREDICTED HEART RATE: 156 BPM
PERCENT MAX PREDICTED HR: 51.28 %
STRESS BASELINE BP: NORMAL MMHG
STRESS BASELINE HR: 74 BPM
STRESS PERCENT HR: 60 %
STRESS POST PEAK BP: NORMAL MMHG
STRESS POST PEAK HR: 80 BPM
STRESS TARGET HR: 133 BPM
STRESS TARGET HR: 133 BPM

## 2018-11-13 PROCEDURE — 93306 TTE W/DOPPLER COMPLETE: CPT | Performed by: INTERNAL MEDICINE

## 2018-11-13 PROCEDURE — 93017 CV STRESS TEST TRACING ONLY: CPT

## 2018-11-13 PROCEDURE — 93306 TTE W/DOPPLER COMPLETE: CPT

## 2018-11-13 PROCEDURE — 78452 HT MUSCLE IMAGE SPECT MULT: CPT | Performed by: INTERNAL MEDICINE

## 2018-11-13 PROCEDURE — 25010000002 REGADENOSON 0.4 MG/5ML SOLUTION: Performed by: INTERNAL MEDICINE

## 2018-11-13 PROCEDURE — A9500 TC99M SESTAMIBI: HCPCS | Performed by: INTERNAL MEDICINE

## 2018-11-13 PROCEDURE — 93018 CV STRESS TEST I&R ONLY: CPT | Performed by: INTERNAL MEDICINE

## 2018-11-13 PROCEDURE — 78452 HT MUSCLE IMAGE SPECT MULT: CPT

## 2018-11-13 PROCEDURE — 0 TECHNETIUM SESTAMIBI: Performed by: INTERNAL MEDICINE

## 2018-11-13 RX ADMIN — TECHNETIUM TC 99M SESTAMIBI 1 DOSE: 1 INJECTION INTRAVENOUS at 12:38

## 2018-11-13 RX ADMIN — TECHNETIUM TC 99M SESTAMIBI 1 DOSE: 1 INJECTION INTRAVENOUS at 13:50

## 2018-11-13 RX ADMIN — REGADENOSON 0.4 MG: 0.08 INJECTION, SOLUTION INTRAVENOUS at 13:50

## 2018-11-14 RX ORDER — NITROGLYCERIN 80 MG/1
1 PATCH TRANSDERMAL SEE ADMIN INSTRUCTIONS
Qty: 30 PATCH | Refills: 11 | Status: SHIPPED | OUTPATIENT
Start: 2018-11-14 | End: 2019-06-05 | Stop reason: SDUPTHER

## 2018-11-28 ENCOUNTER — TELEPHONE (OUTPATIENT)
Dept: CARDIOLOGY | Facility: CLINIC | Age: 63
End: 2018-11-28

## 2018-11-28 DIAGNOSIS — I20.0 UNSTABLE ANGINA PECTORIS (HCC): ICD-10-CM

## 2018-11-28 DIAGNOSIS — R94.39 ABNORMAL NUCLEAR STRESS TEST: Primary | ICD-10-CM

## 2018-12-10 ENCOUNTER — TELEPHONE (OUTPATIENT)
Dept: CARDIOLOGY | Facility: CLINIC | Age: 63
End: 2018-12-10

## 2018-12-10 ENCOUNTER — LAB (OUTPATIENT)
Dept: LAB | Facility: HOSPITAL | Age: 63
End: 2018-12-10
Attending: INTERNAL MEDICINE

## 2018-12-10 DIAGNOSIS — R00.2 PALPITATION: ICD-10-CM

## 2018-12-10 DIAGNOSIS — I25.84 CORONARY ARTERY DISEASE DUE TO CALCIFIED CORONARY LESION: ICD-10-CM

## 2018-12-10 DIAGNOSIS — I25.9 ISCHEMIC HEART DISEASE: ICD-10-CM

## 2018-12-10 DIAGNOSIS — R06.02 SHORTNESS OF BREATH: ICD-10-CM

## 2018-12-10 DIAGNOSIS — R07.2 PRECORDIAL PAIN: ICD-10-CM

## 2018-12-10 DIAGNOSIS — I25.10 CORONARY ARTERY DISEASE DUE TO CALCIFIED CORONARY LESION: ICD-10-CM

## 2018-12-10 DIAGNOSIS — I10 ESSENTIAL HYPERTENSION: ICD-10-CM

## 2018-12-10 DIAGNOSIS — I25.84 CORONARY ARTERY DISEASE DUE TO CALCIFIED CORONARY LESION: Primary | ICD-10-CM

## 2018-12-10 DIAGNOSIS — I25.10 CORONARY ARTERY DISEASE DUE TO CALCIFIED CORONARY LESION: Primary | ICD-10-CM

## 2018-12-10 PROCEDURE — 80048 BASIC METABOLIC PNL TOTAL CA: CPT | Performed by: INTERNAL MEDICINE

## 2018-12-10 PROCEDURE — 36415 COLL VENOUS BLD VENIPUNCTURE: CPT

## 2018-12-10 NOTE — TELEPHONE ENCOUNTER
PATIENT HERE TO PICK-UP [facility] INSTRUCTIONS. ALL DISCUSSED WITH PATIENT. PRE-CATH LABS ORDERED AS PER DR. FORDE STANDARD ORDERS AND PATIENT TO GO DOWNSTAIRS TO GET THEM DRAWN TODAY. PATIENT VERBALIZED HE UNDERSTOOD. PH,LPN

## 2018-12-11 LAB
ANION GAP SERPL CALCULATED.3IONS-SCNC: 9.1 MMOL/L (ref 3.6–11.2)
BUN BLD-MCNC: 16 MG/DL (ref 7–21)
BUN/CREAT SERPL: 12.3 (ref 7–25)
CALCIUM SPEC-SCNC: 9.3 MG/DL (ref 7.7–10)
CHLORIDE SERPL-SCNC: 104 MMOL/L (ref 99–112)
CO2 SERPL-SCNC: 23.9 MMOL/L (ref 24.3–31.9)
CREAT BLD-MCNC: 1.3 MG/DL (ref 0.43–1.29)
GFR SERPL CREATININE-BSD FRML MDRD: 56 ML/MIN/1.73
GLUCOSE BLD-MCNC: 177 MG/DL (ref 70–110)
OSMOLALITY SERPL CALC.SUM OF ELEC: 279.4 MOSM/KG (ref 273–305)
POTASSIUM BLD-SCNC: 4.6 MMOL/L (ref 3.5–5.3)
SODIUM BLD-SCNC: 137 MMOL/L (ref 135–153)

## 2018-12-12 PROCEDURE — 85025 COMPLETE CBC W/AUTO DIFF WBC: CPT | Performed by: INTERNAL MEDICINE

## 2018-12-12 PROCEDURE — 85007 BL SMEAR W/DIFF WBC COUNT: CPT | Performed by: INTERNAL MEDICINE

## 2018-12-13 LAB
DEPRECATED RDW RBC AUTO: 44.1 FL (ref 37–54)
EOSINOPHIL # BLD MANUAL: 0.28 10*3/MM3 (ref 0–0.7)
EOSINOPHIL NFR BLD MANUAL: 9 % (ref 0–5)
ERYTHROCYTE [DISTWIDTH] IN BLOOD BY AUTOMATED COUNT: 13.2 % (ref 11.5–14.5)
HCT VFR BLD AUTO: 39.1 % (ref 42–52)
HGB BLD-MCNC: 13.1 G/DL (ref 14–18)
LYMPHOCYTES # BLD MANUAL: 1.73 10*3/MM3 (ref 1–3)
LYMPHOCYTES NFR BLD MANUAL: 12 % (ref 0–10)
LYMPHOCYTES NFR BLD MANUAL: 56 % (ref 21–51)
MCH RBC QN AUTO: 30.9 PG (ref 27–33)
MCHC RBC AUTO-ENTMCNC: 33.5 G/DL (ref 33–37)
MCV RBC AUTO: 92.2 FL (ref 80–94)
MONOCYTES # BLD AUTO: 0.37 10*3/MM3 (ref 0.1–0.9)
NEUTROPHILS # BLD AUTO: 0.71 10*3/MM3 (ref 1.4–6.5)
NEUTROPHILS NFR BLD MANUAL: 23 % (ref 30–70)
PLAT MORPH BLD: NORMAL
PLATELET # BLD AUTO: 157 10*3/MM3 (ref 130–400)
PMV BLD AUTO: 12.5 FL (ref 6–10)
RBC # BLD AUTO: 4.24 10*6/MM3 (ref 4.7–6.1)
RBC MORPH BLD: NORMAL
SCAN SLIDE: NORMAL
WBC NRBC COR # BLD: 3.09 10*3/MM3 (ref 4.5–12.5)

## 2019-01-11 ENCOUNTER — OUTSIDE FACILITY SERVICE (OUTPATIENT)
Dept: CARDIOLOGY | Facility: CLINIC | Age: 64
End: 2019-01-11

## 2019-01-11 ENCOUNTER — TELEPHONE (OUTPATIENT)
Dept: CARDIOLOGY | Facility: CLINIC | Age: 64
End: 2019-01-11

## 2019-01-11 PROCEDURE — 93455 CORONARY ART/GRFT ANGIO S&I: CPT | Performed by: INTERNAL MEDICINE

## 2019-01-11 NOTE — TELEPHONE ENCOUNTER
PATIENT ARRIVED AT [facility] FOR LHC PER DR. FORDE TODAY AND NEEDING DX CODE TO COVER PRE-CATH LABS ORDERED. Z79.01, LONG TERM USE OF ANTI-COAGULATION CODE ADDED TO PRE-CATH LAB ORDER, OK PER DR. FORDE. CODE ADDED AND FAXED TO AILYN AT [facility]. PH,LPN

## 2019-01-31 ENCOUNTER — TELEPHONE (OUTPATIENT)
Dept: CARDIOLOGY | Facility: CLINIC | Age: 64
End: 2019-01-31

## 2019-02-01 NOTE — TELEPHONE ENCOUNTER
Spoke with pt, appointment scheduled for 2/13/19, sooner appointment offered, pt was unable to come.

## 2019-02-13 ENCOUNTER — OFFICE VISIT (OUTPATIENT)
Dept: CARDIOLOGY | Facility: CLINIC | Age: 64
End: 2019-02-13

## 2019-02-13 VITALS
WEIGHT: 219.2 LBS | HEART RATE: 90 BPM | BODY MASS INDEX: 31.38 KG/M2 | HEIGHT: 70 IN | DIASTOLIC BLOOD PRESSURE: 64 MMHG | SYSTOLIC BLOOD PRESSURE: 116 MMHG

## 2019-02-13 DIAGNOSIS — I50.30 CONGESTIVE HEART FAILURE WITH LV DIASTOLIC DYSFUNCTION, NYHA CLASS 2 (HCC): Primary | ICD-10-CM

## 2019-02-13 DIAGNOSIS — I25.9 IHD (ISCHEMIC HEART DISEASE): ICD-10-CM

## 2019-02-13 DIAGNOSIS — Z95.810 ICD (IMPLANTABLE CARDIOVERTER-DEFIBRILLATOR) IN PLACE: ICD-10-CM

## 2019-02-13 DIAGNOSIS — R06.02 SHORTNESS OF BREATH: ICD-10-CM

## 2019-02-13 DIAGNOSIS — I25.5 ISCHEMIC CARDIOMYOPATHY: ICD-10-CM

## 2019-02-13 DIAGNOSIS — Z78.9 PACED RHYTHM ON ELECTROCARDIOGRAM (ECG): ICD-10-CM

## 2019-02-13 DIAGNOSIS — E78.00 HYPERCHOLESTEREMIA: ICD-10-CM

## 2019-02-13 PROCEDURE — 93000 ELECTROCARDIOGRAM COMPLETE: CPT | Performed by: NURSE PRACTITIONER

## 2019-02-13 PROCEDURE — 99213 OFFICE O/P EST LOW 20 MIN: CPT | Performed by: NURSE PRACTITIONER

## 2019-02-13 RX ORDER — LANCETS 33 GAUGE
EACH MISCELLANEOUS
Refills: 0 | COMMUNITY
Start: 2018-11-06

## 2019-02-13 RX ORDER — POTASSIUM CHLORIDE 750 MG/1
10 TABLET, FILM COATED, EXTENDED RELEASE ORAL AS NEEDED
Refills: 2 | COMMUNITY
Start: 2019-01-24

## 2019-02-13 NOTE — PROGRESS NOTES
Chief Complaint   Patient presents with   • Follow-up     For cardiac management. Patient is taking aspirin. Patient had labs done on 12/12/18. Patient had no med list and didn't know the names of the medication, going to call and have pharmacy fax a list of medications over.    • Med Refill     PCP normally refills medication.    • Chest Pain     Patient states he always has chest pains but they seem to be better than before. Patient states he seems to get chest pains more when he is tired.    • Shortness of Breath     No more than usual. Happens with exertion.   • Palpitations     Patient states it comes and goes.    • Pacemaker Check     [employer] BIV ICD checked on 11/7/18       Subjective       Adrian Samuel is a 63 y.o. male  with history of HTN, hyperlipidemia, IDDM, and complex cardiac problems for which he has had PTCA, bypass, and numerous caths in the past. A biventricular ICD placed in 2012. In March 2015, cath showed a lesion in the LAD after the LIMA and he had stenting of the same. In September 2015, echo showed his EF 30-35% with moderate to severe MR. He was supposed to had started Entresto, but apparently did not. He had been taking just losartan. In 2015, he was referred back to  heart failure clinic. Entresto restarted. In March 2017, repeat Lexiscan stress showed lateral wall ischemia. The dose of nitrodur patch increased. Echocardiogram showed LV function remained low with EF 35-40%. Cath recommended if symptoms persisted. On 5/2/18, he returned for follow up visit and device interrogated. He reported more short of breath and chest pain, worse with exertion and relieved by 1 to 3 sl nitro. BiV/ICD interrogation reported 5/2/18 was normal. A cardiac cath was recommended. He was referred to Dr. Fuentes for radial approach. LIMA was patent. Proximal SVG to PDA had stenosis and 3.5x30 Resolute stent was placed. LVEF 35% noted. November 2018 device interrogation showed fluid overload. Aldactone  "added. 11/13/18, stress showed inferolateral infarct with nba-infarct ischemia. LVEF 34%. Nitro patch was advised and cardiac cath via radial approach scheduled. Cath showed LVEF 30-35%, patent grafts with significantly depressed systolic function. Empiric therapy for ischemia and maxim therapy for cardiomyopathy recommended.     Today he comes to the office for a follow up visit. He reports tolerating nitroglycerin patches \"pretty well\", but has been \"weaning off\". He has stable mild angina, but better than before. Shortness of breath has been worse lately. No issues with edema since addition of Aldactone.     HPI     Cardiac History:    Past Surgical History:   Procedure Laterality Date   • CARDIAC CATHETERIZATION  1993    Cath-PTCA.   • CARDIAC CATHETERIZATION  02/08/2005    Cath-() Patent Grafts   • CARDIAC CATHETERIZATION  08/01/2005    Cath-(Dr. Sigala) Patent Grafts.   • CARDIAC CATHETERIZATION  09/18/2007    Cath-100%LAD, 80%D2. 100%LCX, RCA. Patent [place] & SVG to PDA.   • CARDIAC CATHETERIZATION  08/22/2006    Cath-Patent Grafts   • CARDIAC CATHETERIZATION  01/15/2010    Cath-Patent LIMA & SVG to PDA. 80% OM1-2.25 x 16mm Taxus Stent   • CARDIAC CATHETERIZATION  02/14/2012    Cath-IVUS LAD 60-70%, 2.55 x 20mm Promus stent Ramus   • CARDIAC CATHETERIZATION  03/12/2013    Cath-3.5 x 28Promus stent SVG to RCA.   • CARDIAC CATHETERIZATION  11/25/2013    Cath-(Dr. Sigala) EF30-35%, occluded circ and graft but increase collaterol flow manage medically.   • CARDIAC CATHETERIZATION  12/22/2014    Cath-85% SVG to PDA-   • CARDIAC CATHETERIZATION  03/18/2015    Cath-90%LIMA to LAD-80% SVG to PDA-2.75 x 8 Resolute   • CARDIAC CATHETERIZATION  05/29/2018    80% SVG to PDA- 3.5x30 Resolute Stent.   • CARDIAC DEFIBRILLATOR PLACEMENT  2012    BiV/ICD, MENG   • CARDIOVASCULAR STRESS TEST  02/19/2008    [place]-75%THR. EF 43%. InferoLateral Infarct with Nba-Infarct Ischemia   • CARDIOVASCULAR STRESS TEST  " 05/08/2009    D.Myoview-78%THR. EF48%. Inferiolateral wall infarct and nba-infarct ischemia.   • CARDIOVASCULAR STRESS TEST  03/03/2010    L.Myoview-Lateral Ischemia   • CARDIOVASCULAR STRESS TEST  02/13/2013    L.Myoview-lateral wall ischemia   • CARDIOVASCULAR STRESS TEST  11/11/2013    L.Myoview-lateral wall ischemia   • CARDIOVASCULAR STRESS TEST  11/18/2014    L.Myoview-([facility]) EF. Inferolateral Infarct   • CARDIOVASCULAR STRESS TEST  03/17/2015    L.Myoview-Sig Anterior Changes   • CARDIOVASCULAR STRESS TEST  03/15/2017    Lexiscan- lateral wall ischemia, nitrodur increased, cath if symptoms persist   • CARDIOVASCULAR STRESS TEST  11/13/2018    L. Cardiolite- EF 34%. Inferolateral Infarct with periinfarct Ischemia.   • CONVERTED (HISTORICAL) CARDIOVASCULAR STUDIES  09/05/2012    MUGA-RVEF 41%, LVEF 38%   • CONVERTED (HISTORICAL) HOLTER  06/11/2006    Holter-PVC's noted   • CORONARY ARTERY BYPASS GRAFT  1995    CABG LIMA TO LAD, SVG TO OM and SVG to PDA.   • ECHO - CONVERTED  08/29/2007    Echo-EF50-55%, mild to mod. MR, mildly hypokinetic septum.   • ECHO - CONVERTED  05/08/2009    Echo-EF45-49%, Moderate MR and PA pressure-42 mmHg.   • ECHO - CONVERTED  01/18/2012    Echo-EF 25-30%   • ECHO - CONVERTED  05/17/2012    Echo-EF<30%. AnteroSpectal WMA.   • ECHO - CONVERTED  09/02/2015    Echo-EF 30-35%, mod to severe MR   • ECHO - CONVERTED  03/15/2017    EF 35-40%, RVSP 25-30 mmHg   • ECHO - CONVERTED  11/13/2018    EF 35%. Inferolateral WMA. Mild- Mod MR. RVSP- 30 mmHg   • OTHER SURGICAL HISTORY  02/28/2014    U/S Aorta-   • US CAROTID UNILATERAL  05/21/2015    Carotid US-no hemodyanamically significant stenosis   • US CAROTID UNILATERAL  04/10/1989    Carotid US-No sig. stenosis noted       Current Outpatient Medications   Medication Sig Dispense Refill   • aspirin 81 MG EC tablet Take 81 mg by mouth daily.     • atorvastatin (LIPITOR) 80 MG tablet Take 80 mg by mouth Daily.     • baclofen (LIORESAL) 10 MG  tablet Take 10 mg by mouth Daily As Needed for Muscle Spasms.     • carbidopa-levodopa (SINEMET)  MG per tablet Take 2 tablets by mouth 2 (Two) Times a Day.     • cetirizine (ZyrTEC) 10 MG tablet Take 10 mg by mouth daily.     • citalopram (CeleXA) 40 MG tablet Take 40 mg by mouth daily.     • fenofibrate (TRICOR) 145 MG tablet Take 145 mg by mouth Daily.     • HYDROcodone-acetaminophen (NORCO)  MG per tablet Take 1 tablet by mouth Every 6 (Six) Hours As Needed for Moderate Pain .     • Insulin Aspart Prot & Aspart (NOVOLOG MIX 70/30 PENFILL SC) Inject  under the skin.     • nebivolol (BYSTOLIC) 5 MG tablet Take 5 mg by mouth Daily.     • nitroglycerin (NITRODUR) 0.4 MG/HR patch Place 1 patch on the skin as directed by provider See Admin Instructions. Apply patch daily, remove at night for at least 10 hours 30 patch 11   • nitroglycerin (NITRODUR) 0.6 MG/HR patch Place 1 patch on the skin Daily. 30 patch 8   • nitroglycerin (NITROSTAT) 0.4 MG SL tablet Place 0.4 mg under the tongue every 5 (five) minutes as needed for chest pain. Take no more than 3 doses in 15 minutes.     • O2 (OXYGEN) Inhale 2 L/min at night as needed.     • ondansetron (ZOFRAN) 4 MG tablet Take 4 mg by mouth 3 (three) times a day as needed for nausea or vomiting.     • pantoprazole (PROTONIX) 40 MG EC tablet Take 40 mg by mouth daily.     • polyethylene glycol (MIRALAX) packet Take 17 g by mouth daily as needed.     • pregabalin (LYRICA) 75 MG capsule Take 75 mg by mouth Every Night.     • sacubitril-valsartan (ENTRESTO) 24-26 MG tablet Take 1 tablet by mouth 2 (Two) Times a Day. 28 tablet 0   • SITagliptin (JANUVIA) 25 MG tablet Take 25 mg by mouth Daily.     • spironolactone (ALDACTONE) 25 MG tablet Take 1 tablet by mouth Daily. 90 tablet 3   • ticagrelor (BRILINTA) 90 MG tablet tablet Take 90 mg by mouth 2 (Two) Times a Day.     • vitamin D (ERGOCALCIFEROL) 83397 UNITS capsule capsule Take 50,000 Units by mouth 1 (one) time per  week.       No current facility-administered medications for this visit.        Effient [prasugrel]; Imdur [isosorbide dinitrate]; Livalo [pitavastatin]; and Ranolazine    Past Medical History:   Diagnosis Date   • Disease of thyroid gland 04/10/2009    small colloid cyst right thyroid   • Esophageal dilatation     Followed by Dr. Kyle   • Heel spurs removed    • Hypercholesterolemia    • Hypertension     Systemic   • IHD (ischemic heart disease)    • Other abnormal cytological findings on specimens from anus  &     [facility]   • Pain     Chronic       Social History     Socioeconomic History   • Marital status:      Spouse name: Not on file   • Number of children: Not on file   • Years of education: Not on file   • Highest education level: Not on file   Social Needs   • Financial resource strain: Not on file   • Food insecurity - worry: Not on file   • Food insecurity - inability: Not on file   • Transportation needs - medical: Not on file   • Transportation needs - non-medical: Not on file   Occupational History   • Not on file   Tobacco Use   • Smoking status: Former Smoker     Last attempt to quit:      Years since quittin.1   • Smokeless tobacco: Current User     Types: Chew   • Tobacco comment: 2015   Substance and Sexual Activity   • Alcohol use: No   • Drug use: Defer   • Sexual activity: Defer   Other Topics Concern   • Not on file   Social History Narrative   • Not on file       Family History   Problem Relation Age of Onset   • Heart disease Mother    • Stroke Mother    • Diabetes Mother    • Diabetes Father    • Cirrhosis Father    • Heart disease Other    • Diabetes Other        Review of Systems   Constitution: Positive for weakness and malaise/fatigue. Negative for decreased appetite.   HENT: Negative for congestion and nosebleeds.    Eyes: Negative for pain and redness.   Cardiovascular: Positive for chest pain and dyspnea on exertion. Negative for leg swelling and  "near-syncope.   Respiratory: Positive for shortness of breath and sleep disturbances due to breathing (uses oxygen). Negative for cough.    Endocrine: Negative for polydipsia, polyphagia and polyuria.   Hematologic/Lymphatic: Negative for bleeding problem. Does not bruise/bleed easily.   Skin: Negative for dry skin and itching.   Musculoskeletal: Positive for joint pain. Negative for falls and muscle cramps.   Gastrointestinal: Negative for abdominal pain and melena.   Genitourinary: Negative for dysuria and hematuria.   Neurological: Positive for numbness (feet ). Negative for dizziness and headaches.   Psychiatric/Behavioral: Positive for depression. Negative for memory loss and suicidal ideas. The patient is nervous/anxious.         Objective     /64 (BP Location: Right arm, Patient Position: Sitting, Cuff Size: Adult)   Pulse 90   Ht 177.8 cm (70\")   Wt 99.4 kg (219 lb 3.2 oz)   BMI 31.45 kg/m²     Physical Exam   Constitutional: He is oriented to person, place, and time. He appears well-nourished.   HENT:   Head: Normocephalic.   Eyes: Conjunctivae are normal. Pupils are equal, round, and reactive to light.   Neck: Normal range of motion. Neck supple. Carotid bruit is not present.   Cardiovascular: Normal rate, regular rhythm, S1 normal and S2 normal.   Murmur heard.  Pulses:       Radial pulses are 1+ on the right side, and 1+ on the left side.   Pulmonary/Chest: Breath sounds normal. He has no rales.   Abdominal: Soft. Bowel sounds are normal. There is no tenderness.   Musculoskeletal: Normal range of motion. He exhibits no edema.   Neurological: He is alert and oriented to person, place, and time.   Skin: Skin is warm and dry.   Psychiatric: He has a normal mood and affect. His behavior is normal.          ECG 12 Lead  Date/Time: 2/13/2019 12:43 PM  Performed by: Claudia Cerda APRN  Authorized by: Claudia Cerda APRN   Comparison: compared with previous ECG from 7/2/2018  Similar to previous " ECG  Comparison to previous ECG: AV paced  Rhythm: paced  BPM: 90  Pacin% capture              Assessment/Plan      Adrian was seen today for follow-up, med refill, chest pain, shortness of breath, palpitations and pacemaker check.    Diagnoses and all orders for this visit:    Congestive heart failure with LV diastolic dysfunction, NYHA class 2 (CMS/[facility])    IHD (ischemic heart disease)    Ischemic cardiomyopathy    ICD (implantable cardioverter-defibrillator) in place    Hypercholesteremia    Shortness of breath    Paced rhythm on electrocardiogram (ECG)    Other orders  -     ECG 12 Lead    We reviewed most recent cardiac cath report which showed no new area of stenosis and patent grafts. He is being managed medically. He does admit to more shortness of breath. I advised him to continue nitroglycerin patches daily. He feels he may have better benefit using during night and off during day, which agree he can try to see if symptoms improve.     His blood pressure is stable. EKG for management of BiV ICD and medications today shows  AV pacing. Same cardiac medication advised. I have requested a medication list from his pharmacy to clarify current home medications since last visit.     Patient's Body mass index is 31.45 kg/m². BMI is above normal parameters. Recommendations include: nutrition counseling. Diet for management of heart failure and diabetes advised.     For lipid management, he is taking TriCor and Lipitor. He follows with you for lab orders/management. Please forward a copy of results as available.     We will keep appointment for ICD interrogation and office visit in . Please call sooner for any cardiac concerns.            Electronically signed by JONH Nolan,  2019 12:45 PM

## 2019-06-05 ENCOUNTER — OFFICE VISIT (OUTPATIENT)
Dept: CARDIOLOGY | Facility: CLINIC | Age: 64
End: 2019-06-05

## 2019-06-05 VITALS
DIASTOLIC BLOOD PRESSURE: 70 MMHG | HEART RATE: 76 BPM | BODY MASS INDEX: 30.12 KG/M2 | WEIGHT: 210.4 LBS | SYSTOLIC BLOOD PRESSURE: 130 MMHG | HEIGHT: 70 IN

## 2019-06-05 DIAGNOSIS — I25.9 IHD (ISCHEMIC HEART DISEASE): Primary | ICD-10-CM

## 2019-06-05 DIAGNOSIS — I25.5 ISCHEMIC CARDIOMYOPATHY: ICD-10-CM

## 2019-06-05 DIAGNOSIS — R06.02 SHORTNESS OF BREATH: ICD-10-CM

## 2019-06-05 DIAGNOSIS — Z95.810 PRESENCE OF BIVENTRICULAR IMPLANTABLE CARDIOVERTER-DEFIBRILLATOR (ICD): ICD-10-CM

## 2019-06-05 DIAGNOSIS — E78.00 HYPERCHOLESTEREMIA: ICD-10-CM

## 2019-06-05 DIAGNOSIS — E87.79 OTHER HYPERVOLEMIA: ICD-10-CM

## 2019-06-05 DIAGNOSIS — I10 ESSENTIAL HYPERTENSION: ICD-10-CM

## 2019-06-05 DIAGNOSIS — I50.30 CONGESTIVE HEART FAILURE WITH LV DIASTOLIC DYSFUNCTION, NYHA CLASS 2 (HCC): ICD-10-CM

## 2019-06-05 DIAGNOSIS — I25.110 CORONARY ARTERY DISEASE INVOLVING NATIVE CORONARY ARTERY OF NATIVE HEART WITH UNSTABLE ANGINA PECTORIS (HCC): ICD-10-CM

## 2019-06-05 DIAGNOSIS — I50.30 CONGESTIVE HEART FAILURE WITH LV DIASTOLIC DYSFUNCTION, NYHA CLASS 2 (HCC): Primary | ICD-10-CM

## 2019-06-05 PROCEDURE — 93284 PRGRMG EVAL IMPLANTABLE DFB: CPT | Performed by: INTERNAL MEDICINE

## 2019-06-05 PROCEDURE — 99214 OFFICE O/P EST MOD 30 MIN: CPT | Performed by: NURSE PRACTITIONER

## 2019-06-05 RX ORDER — NEBIVOLOL 5 MG/1
5 TABLET ORAL DAILY
Qty: 30 TABLET | Refills: 6 | Status: SHIPPED | OUTPATIENT
Start: 2019-06-05 | End: 2019-12-04 | Stop reason: SDUPTHER

## 2019-06-05 RX ORDER — NITROGLYCERIN 120 MG/1
1 PATCH TRANSDERMAL DAILY
Qty: 30 PATCH | Refills: 6 | Status: SHIPPED | OUTPATIENT
Start: 2019-06-05 | End: 2019-12-04 | Stop reason: SDUPTHER

## 2019-06-05 RX ORDER — SPIRONOLACTONE 25 MG/1
25 TABLET ORAL DAILY
Qty: 90 TABLET | Refills: 6 | Status: SHIPPED | OUTPATIENT
Start: 2019-06-05 | End: 2019-12-04 | Stop reason: ALTCHOICE

## 2019-06-05 RX ORDER — NITROGLYCERIN 80 MG/1
1 PATCH TRANSDERMAL SEE ADMIN INSTRUCTIONS
Qty: 30 PATCH | Refills: 6 | Status: SHIPPED | OUTPATIENT
Start: 2019-06-05 | End: 2019-06-06

## 2019-06-05 NOTE — PROGRESS NOTES
"Chief Complaint   Patient presents with   • Follow-up     cardiac management. Most recent labs done per PCP, not on chart but will call for.   • Aspirin     81 mg daily dose   • Pacemaker Check     [employer] ICD checked today   • Chest Pain     states \" no more than usual\"   • Refills     Needs refills for cardiac meds- 30 day supply at [facility]       Subjective       Adrian Samuel is a 63 y.o. male  with history of HTN, hyperlipidemia, IDDM, and complex cardiac problems for which he has had PTCA, bypass, and numerous caths in the past. A biventricular ICD placed in 2012. In March 2015, cath showed a lesion in the LAD after the LIMA and he had stenting of the same. In September 2015, echo showed his EF 30-35% with moderate to severe MR. He was supposed to had started Entresto, but apparently did not. He had been taking just losartan. In 2015, he was referred back to  heart failure clinic. Entresto restarted. In March 2017, repeat Lexiscan stress showed lateral wall ischemia. The dose of nitrodur patch increased. Echocardiogram showed LV function remained low with EF 35-40%. Cath recommended if symptoms persisted. On 5/2/18, he returned for follow up visit and device interrogated. He reported more short of breath and chest pain, worse with exertion and relieved by 1 to 3 sl nitro. BiV/ICD interrogation reported 5/2/18 was normal. A cardiac cath was recommended. He was referred to Dr. Fuentes for radial approach. LIMA was patent. Proximal SVG to PDA had stenosis and 3.5x30 Resolute stent was placed. LVEF 35% noted. November 2018 device interrogation showed fluid overload. Aldactone added. 11/13/18, stress showed inferolateral infarct with nba-infarct ischemia. LVEF 34%. Nitro patch was advised and cardiac cath via radial approach scheduled. On 1/11/19, Cath showed LVEF 30-35%, patent grafts with significantly depressed systolic function. Empiric therapy for ischemia and maxim therapy for cardiomyopathy " recommended.     Today he comes to the office for an ICD interrogation and follow-up visit.  He admits to being under quite a bit of stress having to care for his wife and taking care of daily chores.  He denies worsening chest pain or increased shortness of breath.  Blood pressure has been stable.  Overall, he denies new or worsening cardiac symptoms      HPI     Cardiac History:    Past Surgical History:   Procedure Laterality Date   • CARDIAC CATHETERIZATION  1993    Cath-PTCA.   • CARDIAC CATHETERIZATION  02/08/2005    Cath-() Patent Grafts   • CARDIAC CATHETERIZATION  08/01/2005    Cath-(Dr. Sigala) Patent Grafts.   • CARDIAC CATHETERIZATION  09/18/2007    Cath-100%LAD, 80%D2. 100%LCX, RCA. Patent Lima & SVG to PDA.   • CARDIAC CATHETERIZATION  08/22/2006    Cath-Patent Grafts   • CARDIAC CATHETERIZATION  01/15/2010    Cath-Patent LIMA & SVG to PDA. 80% OM1-2.25 x 16mm Taxus Stent   • CARDIAC CATHETERIZATION  02/14/2012    Cath-IVUS LAD 60-70%, 2.55 x 20mm Promus stent Ramus   • CARDIAC CATHETERIZATION  03/12/2013    Cath-3.5 x 28Promus stent SVG to RCA.   • CARDIAC CATHETERIZATION  11/25/2013    Cath-(Dr. Sigala) EF30-35%, occluded circ and graft but increase collaterol flow manage medically.   • CARDIAC CATHETERIZATION  12/22/2014    Cath-85% SVG to PDA-   • CARDIAC CATHETERIZATION  03/18/2015    Cath-90%LIMA to LAD-80% SVG to PDA-2.75 x 8 Resolute   • CARDIAC CATHETERIZATION  05/29/2018    80% SVG to PDA- 3.5x30 Resolute Stent.   • CARDIAC DEFIBRILLATOR PLACEMENT  2012    BiV/ICD, SJM   • CARDIOVASCULAR STRESS TEST  02/19/2008    D.Myoview-75%THR. EF 43%. InferoLateral Infarct with Nba-Infarct Ischemia   • CARDIOVASCULAR STRESS TEST  05/08/2009    D.Myoview-78%THR. EF48%. Inferiolateral wall infarct and nba-infarct ischemia.   • CARDIOVASCULAR STRESS TEST  03/03/2010    L.Myoview-Lateral Ischemia   • CARDIOVASCULAR STRESS TEST  02/13/2013    L.Myoview-lateral wall ischemia   • CARDIOVASCULAR  STRESS TEST  11/11/2013    L.Myoview-lateral wall ischemia   • CARDIOVASCULAR STRESS TEST  11/18/2014    L.Myoview-([facility]) EF. Inferolateral Infarct   • CARDIOVASCULAR STRESS TEST  03/17/2015    L.Myoview-Sig Anterior Changes   • CARDIOVASCULAR STRESS TEST  03/15/2017    Lexiscan- lateral wall ischemia, nitrodur increased, cath if symptoms persist   • CARDIOVASCULAR STRESS TEST  11/13/2018    L. Cardiolite- EF 34%. Inferolateral Infarct with periinfarct Ischemia.   • CONVERTED (HISTORICAL) CARDIOVASCULAR STUDIES  09/05/2012    MUGA-RVEF 41%, LVEF 38%   • CONVERTED (HISTORICAL) HOLTER  06/11/2006    Holter-PVC's noted   • CORONARY ARTERY BYPASS GRAFT  1995    CABG LIMA TO LAD, SVG TO OM and SVG to PDA.   • ECHO - CONVERTED  08/29/2007    Echo-EF50-55%, mild to mod. MR, mildly hypokinetic septum.   • ECHO - CONVERTED  05/08/2009    Echo-EF45-49%, Moderate MR and PA pressure-42 mmHg.   • ECHO - CONVERTED  01/18/2012    Echo-EF 25-30%   • ECHO - CONVERTED  05/17/2012    Echo-EF<30%. AnteroSpectal WMA.   • ECHO - CONVERTED  09/02/2015    Echo-EF 30-35%, mod to severe MR   • ECHO - CONVERTED  03/15/2017    EF 35-40%, RVSP 25-30 mmHg   • ECHO - CONVERTED  11/13/2018    EF 35%. Inferolateral WMA. Mild- Mod MR. RVSP- 30 mmHg   • OTHER SURGICAL HISTORY  02/28/2014    U/S Aorta-   • US CAROTID UNILATERAL  05/21/2015    Carotid US-no hemodyanamically significant stenosis   • US CAROTID UNILATERAL  04/10/1989    Carotid US-No sig. stenosis noted       Current Outpatient Medications   Medication Sig Dispense Refill   • aspirin 81 MG EC tablet Take 81 mg by mouth daily.     • atorvastatin (LIPITOR) 80 MG tablet Take 80 mg by mouth Daily.     • baclofen (LIORESAL) 10 MG tablet Take 10 mg by mouth Daily As Needed for Muscle Spasms.     • carbidopa-levodopa (SINEMET)  MG per tablet Take 2 tablets by mouth 2 (Two) Times a Day.     • cetirizine (ZyrTEC) 10 MG tablet Take 10 mg by mouth daily.     • citalopram (CeleXA) 40 MG tablet  "Take 40 mg by mouth daily.     • fenofibrate (TRICOR) 145 MG tablet Take 145 mg by mouth Daily.     • HYDROcodone-acetaminophen (NORCO)  MG per tablet Take 1 tablet by mouth Every 6 (Six) Hours As Needed for Moderate Pain .     • Insulin Aspart Prot & Aspart (NOVOLOG MIX 70/30 PENFILL SC) Inject  under the skin.     • nebivolol (BYSTOLIC) 5 MG tablet Take 1 tablet by mouth Daily. 30 tablet 6   • nitroglycerin (NITRODUR) 0.4 MG/HR patch Place 1 patch on the skin as directed by provider See Admin Instructions. Apply patch daily, remove at night for at least 10 hours 30 patch 6   • nitroglycerin (NITRODUR) 0.6 MG/HR patch Place 1 patch on the skin as directed by provider Daily. 30 patch 6   • nitroglycerin (NITROSTAT) 0.4 MG SL tablet Place 0.4 mg under the tongue every 5 (five) minutes as needed for chest pain. Take no more than 3 doses in 15 minutes.     • O2 (OXYGEN) Inhale 2 L/min at night as needed.     • ondansetron (ZOFRAN) 8 MG tablet Take 8 mg by mouth 3 (Three) Times a Day As Needed for Nausea or Vomiting.     • ONE TOUCH ULTRA TEST test strip   0   • ONETOUCH DELICA LANCETS 33G misc   0   • pantoprazole (PROTONIX) 40 MG EC tablet Take 40 mg by mouth daily.     • polyethylene glycol (MIRALAX) packet Take 17 g by mouth daily as needed.     • potassium chloride (K-DUR) 10 MEQ CR tablet Take 10 mEq by mouth Daily.  2   • pregabalin (LYRICA) 100 MG capsule Take 75 mg by mouth Every Night.     • sacubitril-valsartan (ENTRESTO) 24-26 MG tablet Take 1 tablet by mouth 2 (Two) Times a Day. 60 tablet 6   • SITagliptin (JANUVIA) 25 MG tablet Take 25 mg by mouth Daily.     • spironolactone (ALDACTONE) 25 MG tablet Take 1 tablet by mouth Daily. 90 tablet 6   • SURE COMFORT INSULIN SYRINGE 31G X 5/16\" 1 ML misc   6   • ticagrelor (BRILINTA) 90 MG tablet tablet Take 1 tablet by mouth 2 (Two) Times a Day. 60 tablet 6   • vitamin D (ERGOCALCIFEROL) 91564 UNITS capsule capsule Take 50,000 Units by mouth 1 (one) time per " week.       No current facility-administered medications for this visit.        Effient [prasugrel]; Imdur [isosorbide dinitrate]; Livalo [pitavastatin]; and Ranolazine    Past Medical History:   Diagnosis Date   • Disease of thyroid gland 04/10/2009    small colloid cyst right thyroid   • Esophageal dilatation     Followed by Dr. Kyle   • Heel spurs removed    • Hypercholesterolemia    • Hypertension     Systemic   • IHD (ischemic heart disease)    • Other abnormal cytological findings on specimens from anus  & 2007    [facility]   • Pain     Chronic       Social History     Socioeconomic History   • Marital status:      Spouse name: Not on file   • Number of children: Not on file   • Years of education: Not on file   • Highest education level: Not on file   Tobacco Use   • Smoking status: Former Smoker     Last attempt to quit:      Years since quittin.4   • Smokeless tobacco: Current User     Types: Chew   • Tobacco comment: 2015   Substance and Sexual Activity   • Alcohol use: No   • Drug use: Defer   • Sexual activity: Defer       Family History   Problem Relation Age of Onset   • Heart disease Mother    • Stroke Mother    • Diabetes Mother    • Diabetes Father    • Cirrhosis Father    • Heart disease Other    • Diabetes Other        Review of Systems   Constitution: Positive for weakness (same) and malaise/fatigue (same). Negative for decreased appetite.   HENT: Negative for congestion, hoarse voice and nosebleeds.    Eyes: Negative for pain and redness.   Cardiovascular: Positive for chest pain (same). Negative for leg swelling and near-syncope.   Respiratory: Positive for shortness of breath and sleep disturbances due to breathing (uses CPAP).    Endocrine: Positive for cold intolerance and heat intolerance.   Hematologic/Lymphatic: Negative for bleeding problem. Does not bruise/bleed easily.   Skin: Negative for dry skin and itching.   Musculoskeletal: Positive for arthritis, joint  "pain (same) and stiffness. Negative for falls and muscle cramps.   Gastrointestinal: Positive for nausea (at times, no worse). Negative for abdominal pain, heartburn and melena.   Genitourinary: Negative for dysuria and hematuria.   Neurological: Positive for headaches and light-headedness.   Psychiatric/Behavioral: Negative for altered mental status and memory loss. The patient is nervous/anxious.    Allergic/Immunologic: Negative for hives and persistent infections.        Objective     /70 (BP Location: Left arm)   Pulse 76   Ht 177.8 cm (70\")   Wt 95.4 kg (210 lb 6.4 oz)   BMI 30.19 kg/m²     Physical Exam   Constitutional: He is oriented to person, place, and time. He appears well-nourished.   HENT:   Head: Normocephalic.   Eyes: Conjunctivae are normal. Pupils are equal, round, and reactive to light.   Neck: Normal range of motion. Neck supple. Carotid bruit is not present.   Cardiovascular: Normal rate, regular rhythm, S1 normal and S2 normal.   No murmur heard.  Pulses:       Radial pulses are 2+ on the right side, and 2+ on the left side.   Pulmonary/Chest: Breath sounds normal. He has no wheezes. He has no rales.   Abdominal: Soft. Bowel sounds are normal. He exhibits no distension. There is no tenderness.   Musculoskeletal: Normal range of motion. He exhibits no edema.   Neurological: He is alert and oriented to person, place, and time.   Skin: Skin is warm and dry. There is pallor (mild).   Psychiatric: He has a normal mood and affect. His behavior is normal.        Procedures: pacemaker interrogation done today        Assessment/Plan      Adrian was seen today for follow-up, aspirin, pacemaker check, chest pain and refills.    Diagnoses and all orders for this visit:    IHD (ischemic heart disease)  -     ticagrelor (BRILINTA) 90 MG tablet tablet; Take 1 tablet by mouth 2 (Two) Times a Day.  -     nitroglycerin (NITRODUR) 0.6 MG/HR patch; Place 1 patch on the skin as directed by provider " Daily.  -     nitroglycerin (NITRODUR) 0.4 MG/HR patch; Place 1 patch on the skin as directed by provider See Admin Instructions. Apply patch daily, remove at night for at least 10 hours    Congestive heart failure with LV diastolic dysfunction, NYHA class 2 (CMS/HCC)  -     spironolactone (ALDACTONE) 25 MG tablet; Take 1 tablet by mouth Daily.  -     sacubitril-valsartan (ENTRESTO) 24-26 MG tablet; Take 1 tablet by mouth 2 (Two) Times a Day.    Hypercholesteremia    Ischemic cardiomyopathy    Presence of biventricular implantable cardioverter-defibrillator (ICD)    Other hypervolemia  -     spironolactone (ALDACTONE) 25 MG tablet; Take 1 tablet by mouth Daily.    Shortness of breath  -     spironolactone (ALDACTONE) 25 MG tablet; Take 1 tablet by mouth Daily.    Essential hypertension  -     nebivolol (BYSTOLIC) 5 MG tablet; Take 1 tablet by mouth Daily.    His blood pressure and heart rate are normal today.  The same dose of antihypertensives advised.    With nitrate therapy, he denies worsening chest pain.  No repeat cardiac testing advised at this time.    For management of heart failure he is on Entresto and Aldactone.  Shortness of breath no worse and no recent edema noted.    Patient's Body mass index is 30.19 kg/m². BMI is above normal parameters. Recommendations include: nutrition counseling.  DASH diet and dietary management of diabetes advised.    He is on statin therapy in the form of Lipitor.  He will follow with you for lab orders/management.  Please forward a copy of lab results as available.     For management of biventricular ICD and interrogation done today.  Report pending.    A follow-up visit scheduled in 6 months along with device interrogation.  Please call sooner for any cardiac concerns.             Electronically signed by JONH Nolan,  June 5, 2019 1:14 PM

## 2019-06-06 RX ORDER — NITROGLYCERIN 0.4 MG/1
0.4 TABLET SUBLINGUAL
Start: 2019-06-06 | End: 2020-12-16 | Stop reason: SDUPTHER

## 2019-06-07 ENCOUNTER — PRIOR AUTHORIZATION (OUTPATIENT)
Dept: CARDIOLOGY | Facility: CLINIC | Age: 64
End: 2019-06-07

## 2019-10-03 ENCOUNTER — PRIOR AUTHORIZATION (OUTPATIENT)
Dept: CARDIOLOGY | Facility: CLINIC | Age: 64
End: 2019-10-03

## 2019-12-04 ENCOUNTER — OFFICE VISIT (OUTPATIENT)
Dept: CARDIOLOGY | Facility: CLINIC | Age: 64
End: 2019-12-04

## 2019-12-04 VITALS
HEIGHT: 70 IN | BODY MASS INDEX: 30.64 KG/M2 | DIASTOLIC BLOOD PRESSURE: 80 MMHG | HEART RATE: 80 BPM | SYSTOLIC BLOOD PRESSURE: 124 MMHG | WEIGHT: 214 LBS

## 2019-12-04 DIAGNOSIS — I50.30 CONGESTIVE HEART FAILURE WITH LV DIASTOLIC DYSFUNCTION, NYHA CLASS 2 (HCC): ICD-10-CM

## 2019-12-04 DIAGNOSIS — Z79.4 TYPE 2 DIABETES MELLITUS WITH OTHER CIRCULATORY COMPLICATION, WITH LONG-TERM CURRENT USE OF INSULIN (HCC): ICD-10-CM

## 2019-12-04 DIAGNOSIS — I10 ESSENTIAL HYPERTENSION: ICD-10-CM

## 2019-12-04 DIAGNOSIS — I25.118 CORONARY ARTERY DISEASE OF NATIVE ARTERY OF NATIVE HEART WITH STABLE ANGINA PECTORIS (HCC): ICD-10-CM

## 2019-12-04 DIAGNOSIS — I25.110 CORONARY ARTERY DISEASE INVOLVING NATIVE CORONARY ARTERY OF NATIVE HEART WITH UNSTABLE ANGINA PECTORIS (HCC): Primary | ICD-10-CM

## 2019-12-04 DIAGNOSIS — I25.9 IHD (ISCHEMIC HEART DISEASE): ICD-10-CM

## 2019-12-04 DIAGNOSIS — I25.5 ISCHEMIC CARDIOMYOPATHY: Primary | ICD-10-CM

## 2019-12-04 DIAGNOSIS — E78.2 MIXED HYPERLIPIDEMIA: ICD-10-CM

## 2019-12-04 DIAGNOSIS — Z95.810 PRESENCE OF BIVENTRICULAR IMPLANTABLE CARDIOVERTER-DEFIBRILLATOR (ICD): ICD-10-CM

## 2019-12-04 DIAGNOSIS — E11.59 TYPE 2 DIABETES MELLITUS WITH OTHER CIRCULATORY COMPLICATION, WITH LONG-TERM CURRENT USE OF INSULIN (HCC): ICD-10-CM

## 2019-12-04 PROCEDURE — 99214 OFFICE O/P EST MOD 30 MIN: CPT | Performed by: NURSE PRACTITIONER

## 2019-12-04 PROCEDURE — 93284 PRGRMG EVAL IMPLANTABLE DFB: CPT | Performed by: INTERNAL MEDICINE

## 2019-12-04 RX ORDER — ATORVASTATIN CALCIUM 80 MG/1
80 TABLET, FILM COATED ORAL DAILY
Qty: 30 TABLET | Refills: 8 | Status: SHIPPED | OUTPATIENT
Start: 2019-12-04 | End: 2020-12-16 | Stop reason: SDUPTHER

## 2019-12-04 RX ORDER — NEBIVOLOL 5 MG/1
5 TABLET ORAL DAILY
Qty: 30 TABLET | Refills: 8 | Status: SHIPPED | OUTPATIENT
Start: 2019-12-04 | End: 2020-12-16 | Stop reason: SDUPTHER

## 2019-12-04 RX ORDER — GLYBURIDE 5 MG/1
5 TABLET ORAL
COMMUNITY
End: 2020-06-17 | Stop reason: ALTCHOICE

## 2019-12-04 RX ORDER — FENOFIBRATE 145 MG/1
145 TABLET, COATED ORAL DAILY
Qty: 30 TABLET | Refills: 8 | Status: SHIPPED | OUTPATIENT
Start: 2019-12-04 | End: 2020-12-16 | Stop reason: SDUPTHER

## 2019-12-04 RX ORDER — NITROGLYCERIN 120 MG/1
1 PATCH TRANSDERMAL DAILY
Qty: 30 PATCH | Refills: 8 | Status: SHIPPED | OUTPATIENT
Start: 2019-12-04 | End: 2022-01-05

## 2019-12-04 RX ORDER — MELATONIN 200 MCG
3 TABLET ORAL NIGHTLY PRN
COMMUNITY
End: 2022-01-05

## 2019-12-04 NOTE — PROGRESS NOTES
Chief Complaint   Patient presents with   • Follow-up     For cardiac management. Patient is on aspirin. Reports that chest pain is no more than usual. Reports that he has shortness of breath, but is no more than better. Reports that he ocassionally has palpitations. Last lab work was done about 2 months ago per PCP, not in chart.    • Pacemaker Check     Last [facility] BiV-ICD check was done on 06/05/19 per our office. Is to have checked today.    • Med Refill     Needs refills on cardiac medications. 90 day supplies to [facility]. Brought medication list with visit.        Subjective       Adrian Samuel is a 64 y.o. male with history of HTN, hyperlipidemia, IDDM, and complex cardiac problems for which he has had PTCA, bypass, and numerous caths in the past. A biventricular ICD placed in 2012. In March 2015, cath showed a lesion in the LAD after the LIMA and he had stenting of the same. In September 2015, echo showed his EF 30-35% with moderate to severe MR. He was supposed to had started Entresto, but apparently did not. He had been taking just losartan. In 2015, he was referred back to  heart failure clinic. Entresto restarted. In March 2017, repeat Lexiscan stress showed lateral wall ischemia. The dose of nitrodur patch increased. Echocardiogram showed LV function remained low with EF 35-40%. Cath recommended if symptoms persisted. On 5/2/18, he returned for follow up visit and device interrogated. He reported more short of breath and chest pain, worse with exertion and relieved by 1 to 3 sl nitro. BiV/ICD interrogation reported 5/2/18 was normal. A cardiac cath was recommended. He was referred to Dr. Fuentes for radial approach. LIMA was patent. Proximal SVG to PDA had stenosis and 3.5x30 Resolute stent was placed. LVEF 35% noted. November 2018 device interrogation showed fluid overload. Aldactone added. 11/13/18, stress showed inferolateral infarct with nba-infarct ischemia. LVEF 34%. Nitro patch was advised and  cardiac cath via radial approach scheduled. On 1/11/19, Cath showed LVEF 30-35%, patent grafts with significantly depressed systolic function. Empiric therapy for ischemia and maxim therapy for cardiomyopathy recommended.    [place] biventricular ICD interrogation done 6/5/2019 showed normal function with 88% atrial and 96% biventricular pacing.  No changes were made.    Today se comes to the office for a follow up visit.  He has mild chest pain shortness of breath but denies being a new or worsening symptom.  No significant palpitations or swelling noted.  He is maintaining his normal daily activity.  It is noted the dose of Januvia is changed and he is no longer on spiral lactone.  He continues Entresto at same dose.    HPI     Cardiac History:    Past Surgical History:   Procedure Laterality Date   • CARDIAC CATHETERIZATION  1993    Cath-PTCA.   • CARDIAC CATHETERIZATION  02/08/2005    Cath-() Patent Grafts   • CARDIAC CATHETERIZATION  08/01/2005    Cath-(Dr. Sigala) Patent Grafts.   • CARDIAC CATHETERIZATION  09/18/2007    Cath-100%LAD, 80%D2. 100%LCX, RCA. Patent Lima & SVG to PDA.   • CARDIAC CATHETERIZATION  08/22/2006    Cath-Patent Grafts   • CARDIAC CATHETERIZATION  01/15/2010    Cath-Patent LIMA & SVG to PDA. 80% OM1-2.25 x 16mm Taxus Stent   • CARDIAC CATHETERIZATION  02/14/2012    Cath-IVUS LAD 60-70%, 2.55 x 20mm Promus stent Ramus   • CARDIAC CATHETERIZATION  03/12/2013    Cath-3.5 x 28Promus stent SVG to RCA.   • CARDIAC CATHETERIZATION  11/25/2013    Cath-(Dr. Sigala) EF30-35%, occluded circ and graft but increase collaterol flow manage medically.   • CARDIAC CATHETERIZATION  12/22/2014    Cath-85% SVG to PDA-   • CARDIAC CATHETERIZATION  03/18/2015    Cath-90%LIMA to LAD-80% SVG to PDA-2.75 x 8 Resolute   • CARDIAC CATHETERIZATION  05/29/2018    80% SVG to PDA- 3.5x30 Resolute Stent.   • CARDIAC DEFIBRILLATOR PLACEMENT  2012    BiV/ICD, MENG   • CARDIOVASCULAR STRESS TEST  02/19/2008     D.Myoview-75%THR. EF 43%. InferoLateral Infarct with Nba-Infarct Ischemia   • CARDIOVASCULAR STRESS TEST  05/08/2009    D.Myoview-78%THR. EF48%. Inferiolateral wall infarct and nba-infarct ischemia.   • CARDIOVASCULAR STRESS TEST  03/03/2010    L.Myoview-Lateral Ischemia   • CARDIOVASCULAR STRESS TEST  02/13/2013    L.Myoview-lateral wall ischemia   • CARDIOVASCULAR STRESS TEST  11/11/2013    L.Myoview-lateral wall ischemia   • CARDIOVASCULAR STRESS TEST  11/18/2014    L.Myoview-([facility]) EF. Inferolateral Infarct   • CARDIOVASCULAR STRESS TEST  03/17/2015    L.Myoview-Sig Anterior Changes   • CARDIOVASCULAR STRESS TEST  03/15/2017    Lexiscan- lateral wall ischemia, nitrodur increased, cath if symptoms persist   • CARDIOVASCULAR STRESS TEST  11/13/2018    L. Cardiolite- EF 34%. Inferolateral Infarct with periinfarct Ischemia.   • CONVERTED (HISTORICAL) CARDIOVASCULAR STUDIES  09/05/2012    MUGA-RVEF 41%, LVEF 38%   • CONVERTED (HISTORICAL) HOLTER  06/11/2006    Holter-PVC's noted   • CORONARY ARTERY BYPASS GRAFT  1995    CABG LIMA TO LAD, SVG TO OM and SVG to PDA.   • ECHO - CONVERTED  08/29/2007    Echo-EF50-55%, mild to mod. MR, mildly hypokinetic septum.   • ECHO - CONVERTED  05/08/2009    Echo-EF45-49%, Moderate MR and PA pressure-42 mmHg.   • ECHO - CONVERTED  01/18/2012    Echo-EF 25-30%   • ECHO - CONVERTED  05/17/2012    Echo-EF<30%. AnteroSpectal WMA.   • ECHO - CONVERTED  09/02/2015    Echo-EF 30-35%, mod to severe MR   • ECHO - CONVERTED  03/15/2017    EF 35-40%, RVSP 25-30 mmHg   • ECHO - CONVERTED  11/13/2018    EF 35%. Inferolateral WMA. Mild- Mod MR. RVSP- 30 mmHg   • OTHER SURGICAL HISTORY  02/28/2014    U/S Aorta-   • US CAROTID UNILATERAL  05/21/2015    Carotid US-no hemodyanamically significant stenosis   • US CAROTID UNILATERAL  04/10/1989    Carotid US-No sig. stenosis noted       Current Outpatient Medications   Medication Sig Dispense Refill   • aspirin 81 MG EC tablet Take 81 mg by mouth  daily.     • atorvastatin (LIPITOR) 80 MG tablet Take 1 tablet by mouth Daily. 30 tablet 8   • baclofen (LIORESAL) 10 MG tablet Take 10 mg by mouth Daily As Needed for Muscle Spasms.     • carbidopa-levodopa (SINEMET)  MG per tablet Take 2 tablets by mouth 2 (Two) Times a Day.     • cetirizine (ZyrTEC) 10 MG tablet Take 10 mg by mouth daily.     • citalopram (CeleXA) 40 MG tablet Take 40 mg by mouth daily.     • fenofibrate (TRICOR) 145 MG tablet Take 1 tablet by mouth Daily. 30 tablet 8   • glyburide (DIAbeta) 5 MG tablet Take 5 mg by mouth Daily With Breakfast.     • HYDROcodone-acetaminophen (NORCO)  MG per tablet Take 1 tablet by mouth Every 6 (Six) Hours As Needed for Moderate Pain .     • Insulin Aspart Prot & Aspart (NOVOLOG MIX 70/30 PENFILL SC) Inject  under the skin.     • Melatonin 3 MG tablet Take 3 mg by mouth At Night As Needed for Sleep.     • nebivolol (BYSTOLIC) 5 MG tablet Take 1 tablet by mouth Daily. 30 tablet 8   • nitroglycerin (NITRODUR) 0.6 MG/HR patch Place 1 patch on the skin as directed by provider Daily. 30 patch 8   • nitroglycerin (NITROSTAT) 0.4 MG SL tablet Place 1 tablet under the tongue Every 5 (Five) Minutes As Needed for Chest Pain. Take no more than 3 doses in 15 minutes.     • O2 (OXYGEN) Inhale 2 L/min at night as needed.     • ondansetron (ZOFRAN) 8 MG tablet Take 8 mg by mouth 3 (Three) Times a Day As Needed for Nausea or Vomiting.     • ONE TOUCH ULTRA TEST test strip   0   • ONETOUCH DELICA LANCETS 33G misc   0   • pantoprazole (PROTONIX) 40 MG EC tablet Take 40 mg by mouth daily.     • polyethylene glycol (MIRALAX) packet Take 17 g by mouth daily as needed.     • potassium chloride (K-DUR) 10 MEQ CR tablet Take 10 mEq by mouth As Needed.  2   • pregabalin (LYRICA) 100 MG capsule Take 75 mg by mouth Every Night.     • sacubitril-valsartan (ENTRESTO) 24-26 MG tablet Take 1 tablet by mouth 2 (Two) Times a Day. 60 tablet 8   • SITagliptin (JANUVIA) 100 MG tablet  "Take 100 mg by mouth Daily.     • SURE COMFORT INSULIN SYRINGE 31G X \" 1 ML misc   6   • ticagrelor (BRILINTA) 90 MG tablet tablet Take 1 tablet by mouth 2 (Two) Times a Day. 60 tablet 8     No current facility-administered medications for this visit.        Effient [prasugrel]; Imdur [isosorbide dinitrate]; Livalo [pitavastatin]; and Ranolazine    Past Medical History:   Diagnosis Date   • Disease of thyroid gland 04/10/2009    small colloid cyst right thyroid   • Esophageal dilatation     Followed by Dr. Kyle   • Heel spurs removed    • Hypercholesterolemia    • Hypertension     Systemic   • IHD (ischemic heart disease)    • Other abnormal cytological findings on specimens from anus  &     [facility]   • Pain     Chronic       Social History     Socioeconomic History   • Marital status:      Spouse name: Not on file   • Number of children: Not on file   • Years of education: Not on file   • Highest education level: Not on file   Tobacco Use   • Smoking status: Former Smoker     Last attempt to quit:      Years since quittin.9   • Smokeless tobacco: Current User     Types: Chew   • Tobacco comment: 2015   Substance and Sexual Activity   • Alcohol use: No   • Drug use: Defer   • Sexual activity: Defer       Family History   Problem Relation Age of Onset   • Heart disease Mother    • Stroke Mother    • Diabetes Mother    • Diabetes Father    • Cirrhosis Father    • Heart disease Other    • Diabetes Other        Review of Systems   Constitution: Positive for malaise/fatigue (same). Negative for decreased appetite.   HENT: Negative for congestion, hoarse voice and nosebleeds.    Eyes: Negative for redness and visual disturbance.   Cardiovascular: Positive for chest pain (mild, no worse), dyspnea on exertion and palpitations (brief, no worse). Negative for leg swelling and near-syncope.   Respiratory: Positive for cough and shortness of breath. Negative for sputum production.    Endocrine: " "Positive for cold intolerance and heat intolerance.   Skin: Negative for dry skin, flushing and itching.   Musculoskeletal: Positive for arthritis. Negative for falls, muscle cramps and muscle weakness.   Gastrointestinal: Negative for abdominal pain, dysphagia, heartburn, melena and nausea.   Genitourinary: Negative for dysuria and hematuria.   Neurological: Positive for light-headedness (at times, no worse), numbness (feet, r/t diabetes) and tremors (controlled). Negative for headaches and paresthesias.   Psychiatric/Behavioral: Negative for altered mental status and memory loss. The patient is not nervous/anxious.    Allergic/Immunologic: Negative.         Objective     /80 (BP Location: Left arm)   Pulse 80   Ht 177.8 cm (70\")   Wt 97.1 kg (214 lb)   BMI 30.71 kg/m²     Physical Exam   Constitutional: He is oriented to person, place, and time. He appears well-nourished.   HENT:   Head: Normocephalic.   Eyes: Conjunctivae are normal. Pupils are equal, round, and reactive to light.   Neck: Normal range of motion. Neck supple. Carotid bruit is not present.   Cardiovascular: Normal rate, regular rhythm, S1 normal and S2 normal.   Murmur heard.   Holosystolic murmur is present with a grade of 2/6.  Pulses:       Radial pulses are 2+ on the right side, and 2+ on the left side.   Pulmonary/Chest: Breath sounds normal. He has no wheezes. He has no rales.   Abdominal: Soft. Bowel sounds are normal. There is no tenderness.   Musculoskeletal: Normal range of motion. He exhibits no edema.   Neurological: He is alert and oriented to person, place, and time.   Skin: Skin is warm. No pallor.   Psychiatric: He has a normal mood and affect. His behavior is normal.        Procedures: device interrogation to be done today         Assessment/Plan      Adrian was seen today for follow-up, pacemaker check and med refill.    Diagnoses and all orders for this visit:    Ischemic cardiomyopathy    Congestive heart failure with " LV diastolic dysfunction, NYHA class 2 (CMS/[facility])  -     sacubitril-valsartan (ENTRESTO) 24-26 MG tablet; Take 1 tablet by mouth 2 (Two) Times a Day.    IHD (ischemic heart disease)  -     ticagrelor (BRILINTA) 90 MG tablet tablet; Take 1 tablet by mouth 2 (Two) Times a Day.  -     nitroglycerin (NITRODUR) 0.6 MG/HR patch; Place 1 patch on the skin as directed by provider Daily.    Coronary artery disease of native artery of native heart with stable angina pectoris (CMS/[facility])    Presence of biventricular implantable cardioverter-defibrillator (ICD)    Essential hypertension  -     nebivolol (BYSTOLIC) 5 MG tablet; Take 1 tablet by mouth Daily.    Mixed hyperlipidemia  -     fenofibrate (TRICOR) 145 MG tablet; Take 1 tablet by mouth Daily.  -     atorvastatin (LIPITOR) 80 MG tablet; Take 1 tablet by mouth Daily.    Type 2 diabetes mellitus with other circulatory complication, with long-term current use of insulin (CMS/[facility])      IHD/ischemic cardiomyopathy/heart failure/MR- his last stress test and echocardiogram done a year ago showed LVEF 35%, mild to moderate MR and inferior lateral wall infarct with nba-infarct ischemia.  He has been managed medically.  According to patient chronic angina remains stable, no worse.  Advised to continue Brilinta and aspirin therapy.  Continue Entresto.  No significant edema or increased shortness of breath noted.  Currently he is not on routine diuretic.  Medication refills given as noted.  No repeat cardiac work-up ordered at this time.  Should any symptoms worsen he understands to call and repeat stress and echo will be considered.    Hypertension-blood pressure today is in normal range.  No change in antihypertensive medications made.    Mixed hyperlipidemia- refills given to continue TriCor and Lipitor.  According to patient he had labs drawn about 2 months ago.  Please forward a copy of lab results to the office?    Diabetes- he will follow with you for management.    Patient's  Body mass index is 30.71 kg/m². BMI is above normal parameters. Recommendations include: nutrition counseling.  Diabetic diet encouraged.  His weight is up 4 pounds.  He admits to monitoring his weight on occasion.  I advised him to monitor daily morning weights for management of heart failure symptoms.     A 6-month follow-up visit scheduled along with the device interrogation.  Please call sooner for any cardiac concerns.           Electronically signed by JONH Nolan,  December 4, 2019 9:19 AM

## 2020-06-17 ENCOUNTER — OFFICE VISIT (OUTPATIENT)
Dept: CARDIOLOGY | Facility: CLINIC | Age: 65
End: 2020-06-17

## 2020-06-17 VITALS
TEMPERATURE: 98 F | HEART RATE: 74 BPM | DIASTOLIC BLOOD PRESSURE: 70 MMHG | WEIGHT: 211.6 LBS | BODY MASS INDEX: 30.29 KG/M2 | SYSTOLIC BLOOD PRESSURE: 120 MMHG | HEIGHT: 70 IN

## 2020-06-17 DIAGNOSIS — I25.9 IHD (ISCHEMIC HEART DISEASE): ICD-10-CM

## 2020-06-17 DIAGNOSIS — I50.30 CONGESTIVE HEART FAILURE WITH LV DIASTOLIC DYSFUNCTION, NYHA CLASS 2 (HCC): ICD-10-CM

## 2020-06-17 DIAGNOSIS — I25.5 ISCHEMIC CARDIOMYOPATHY: ICD-10-CM

## 2020-06-17 DIAGNOSIS — Z95.810 PRESENCE OF BIVENTRICULAR IMPLANTABLE CARDIOVERTER-DEFIBRILLATOR (ICD): ICD-10-CM

## 2020-06-17 DIAGNOSIS — E11.59 TYPE 2 DIABETES MELLITUS WITH OTHER CIRCULATORY COMPLICATION, WITH LONG-TERM CURRENT USE OF INSULIN (HCC): ICD-10-CM

## 2020-06-17 DIAGNOSIS — E78.00 HYPERCHOLESTEREMIA: ICD-10-CM

## 2020-06-17 DIAGNOSIS — I25.110 CORONARY ARTERY DISEASE INVOLVING NATIVE CORONARY ARTERY OF NATIVE HEART WITH UNSTABLE ANGINA PECTORIS (HCC): Primary | ICD-10-CM

## 2020-06-17 DIAGNOSIS — Z79.899 MEDICATION MANAGEMENT: ICD-10-CM

## 2020-06-17 DIAGNOSIS — I25.118 CORONARY ARTERY DISEASE OF NATIVE ARTERY OF NATIVE HEART WITH STABLE ANGINA PECTORIS (HCC): ICD-10-CM

## 2020-06-17 DIAGNOSIS — Z79.4 TYPE 2 DIABETES MELLITUS WITH OTHER CIRCULATORY COMPLICATION, WITH LONG-TERM CURRENT USE OF INSULIN (HCC): ICD-10-CM

## 2020-06-17 PROCEDURE — 93289 INTERROG DEVICE EVAL HEART: CPT | Performed by: INTERNAL MEDICINE

## 2020-06-17 PROCEDURE — 99213 OFFICE O/P EST LOW 20 MIN: CPT | Performed by: NURSE PRACTITIONER

## 2020-06-17 RX ORDER — SPIRONOLACTONE 25 MG/1
25 TABLET ORAL DAILY
COMMUNITY
End: 2020-12-16 | Stop reason: SDUPTHER

## 2020-06-17 NOTE — PROGRESS NOTES
"Chief Complaint   Patient presents with   • Follow-up     cardiac management . Had repeat labs done yesterday per PCP. Has had elevated glucose . PCP does home visits.,   • Pacemaker Check     St. Cam pacemaker checked today   • Palpitations     Reports  \" I always have them\" , said it felt like pacemaker went off a few days ago. He is to have checked today.   • Med Refill     No refills needed , PCP manages refills . had medication list today.       Subjective       Adrian Samuel is a 64 y.o. male  with history of HTN, hyperlipidemia, IDDM, and complex cardiac problems for which he has had PTCA, bypass, and numerous caths in the past. A biventricular ICD placed in 2012. In March 2015, cath showed a lesion in the LAD after the LIMA and he had stenting of the same. In September 2015, echo showed his EF 30-35% with moderate to severe MR. He was supposed to had started Entresto, but apparently did not. He had been taking just losartan. In 2015, he was referred back to  heart failure clinic. Entresto restarted. In March 2017, repeat Lexiscan stress showed lateral wall ischemia. The dose of nitrodur patch increased. Echocardiogram showed LV function remained low with EF 35-40%. Cath recommended if symptoms persisted. On 5/2/18, he returned for follow up visit and device interrogated. He reported more short of breath and chest pain, worse with exertion and relieved by 1 to 3 sl nitro. BiV/ICD interrogation reported 5/2/18 was normal. A cardiac cath was recommended. He was referred to Dr. Fuentes for radial approach. LIMA was patent. Proximal SVG to PDA had stenosis and 3.5x30 Resolute stent was placed. LVEF 35% noted. November 2018 device interrogation showed fluid overload. Aldactone added. 11/13/18, stress showed inferolateral infarct with nba-infarct ischemia. LVEF 34%. Nitro patch was advised and cardiac cath via radial approach scheduled. On 1/11/19, Cath showed LVEF 30-35%, patent grafts with significantly " "depressed systolic function. Empiric therapy for ischemia and maxim therapy for cardiomyopathy recommended.    St Cam interrogation of biventricular ICD done 12/4/2019 showed normal function with battery around 8 months.    Today comes the office for follow-up visit and device interrogation.  He states \"chest pain is been little better\".  His wife is now on hospice care and he has been under more stress in regards.  Night he is not resting very well.  He has noticed he is device \"feeling funny\", as in a vibration type sensation.  According to patient most recent labs showed his \"blood is low\" and blood glucose has been running higher.    HPI     Cardiac History:    Past Surgical History:   Procedure Laterality Date   • CARDIAC CATHETERIZATION  1993    Cath-PTCA.   • CARDIAC CATHETERIZATION  02/08/2005    Cath-() Patent Grafts   • CARDIAC CATHETERIZATION  08/01/2005    Cath-(Dr. Sigala) Patent Grafts.   • CARDIAC CATHETERIZATION  09/18/2007    Cath-100%LAD, 80%D2. 100%LCX, RCA. Patent Lima & SVG to PDA.   • CARDIAC CATHETERIZATION  08/22/2006    Cath-Patent Grafts   • CARDIAC CATHETERIZATION  01/15/2010    Cath-Patent LIMA & SVG to PDA. 80% OM1-2.25 x 16mm Taxus Stent   • CARDIAC CATHETERIZATION  02/14/2012    Cath-IVUS LAD 60-70%, 2.55 x 20mm Promus stent Ramus   • CARDIAC CATHETERIZATION  03/12/2013    Cath-3.5 x 28Promus stent SVG to RCA.   • CARDIAC CATHETERIZATION  11/25/2013    Cath-(Dr. Sigala) EF30-35%, occluded circ and graft but increase collaterol flow manage medically.   • CARDIAC CATHETERIZATION  12/22/2014    Cath-85% SVG to PDA-   • CARDIAC CATHETERIZATION  03/18/2015    Cath-90%LIMA to LAD-80% SVG to PDA-2.75 x 8 Resolute   • CARDIAC CATHETERIZATION  05/29/2018    80% SVG to PDA- 3.5x30 Resolute Stent.   • CARDIAC DEFIBRILLATOR PLACEMENT  2012    BiV/ICD, MENG   • CARDIOVASCULAR STRESS TEST  02/19/2008    D.Myoview-75%THR. EF 43%. InferoLateral Infarct with Brea-Infarct Ischemia   • " CARDIOVASCULAR STRESS TEST  05/08/2009    D.Myoview-78%THR. EF48%. Inferiolateral wall infarct and nba-infarct ischemia.   • CARDIOVASCULAR STRESS TEST  03/03/2010    L.Myoview-Lateral Ischemia   • CARDIOVASCULAR STRESS TEST  02/13/2013    L.Myoview-lateral wall ischemia   • CARDIOVASCULAR STRESS TEST  11/11/2013    L.Myoview-lateral wall ischemia   • CARDIOVASCULAR STRESS TEST  11/18/2014    L.Myoview-([facility]) EF. Inferolateral Infarct   • CARDIOVASCULAR STRESS TEST  03/17/2015    L.Myoview-Sig Anterior Changes   • CARDIOVASCULAR STRESS TEST  03/15/2017    Lexiscan- lateral wall ischemia, nitrodur increased, cath if symptoms persist   • CARDIOVASCULAR STRESS TEST  11/13/2018    L. Cardiolite- EF 34%. Inferolateral Infarct with periinfarct Ischemia.   • CONVERTED (HISTORICAL) CARDIOVASCULAR STUDIES  09/05/2012    MUGA-RVEF 41%, LVEF 38%   • CONVERTED (HISTORICAL) HOLTER  06/11/2006    Holter-PVC's noted   • CORONARY ARTERY BYPASS GRAFT  1995    CABG LIMA TO LAD, SVG TO OM and SVG to PDA.   • ECHO - CONVERTED  08/29/2007    Echo-EF50-55%, mild to mod. MR, mildly hypokinetic septum.   • ECHO - CONVERTED  05/08/2009    Echo-EF45-49%, Moderate MR and PA pressure-42 mmHg.   • ECHO - CONVERTED  01/18/2012    Echo-EF 25-30%   • ECHO - CONVERTED  05/17/2012    Echo-EF<30%. AnteroSpectal WMA.   • ECHO - CONVERTED  09/02/2015    Echo-EF 30-35%, mod to severe MR   • ECHO - CONVERTED  03/15/2017    EF 35-40%, RVSP 25-30 mmHg   • ECHO - CONVERTED  11/13/2018    EF 35%. Inferolateral WMA. Mild- Mod MR. RVSP- 30 mmHg   • OTHER SURGICAL HISTORY  02/28/2014    U/S Aorta-   • US CAROTID UNILATERAL  05/21/2015    Carotid US-no hemodyanamically significant stenosis   • US CAROTID UNILATERAL  04/10/1989    Carotid US-No sig. stenosis noted       Current Outpatient Medications   Medication Sig Dispense Refill   • aspirin 81 MG EC tablet Take 81 mg by mouth daily.     • atorvastatin (LIPITOR) 80 MG tablet Take 1 tablet by mouth Daily. 30  "tablet 8   • baclofen (LIORESAL) 10 MG tablet Take 10 mg by mouth Daily As Needed for Muscle Spasms.     • carbidopa-levodopa (SINEMET)  MG per tablet Take 2 tablets by mouth 2 (Two) Times a Day.     • cetirizine (ZyrTEC) 10 MG tablet Take 10 mg by mouth daily.     • citalopram (CeleXA) 40 MG tablet Take 40 mg by mouth daily.     • fenofibrate (TRICOR) 145 MG tablet Take 1 tablet by mouth Daily. 30 tablet 8   • HYDROcodone-acetaminophen (NORCO)  MG per tablet Take 1 tablet by mouth Every 6 (Six) Hours As Needed for Moderate Pain .     • Insulin Aspart Prot & Aspart (NOVOLOG MIX 70/30 PENFILL SC) Inject  under the skin.     • Melatonin 3 MG tablet Take 3 mg by mouth At Night As Needed for Sleep.     • nebivolol (BYSTOLIC) 5 MG tablet Take 1 tablet by mouth Daily. 30 tablet 8   • nitroglycerin (NITRODUR) 0.6 MG/HR patch Place 1 patch on the skin as directed by provider Daily. 30 patch 8   • nitroglycerin (NITROSTAT) 0.4 MG SL tablet Place 1 tablet under the tongue Every 5 (Five) Minutes As Needed for Chest Pain. Take no more than 3 doses in 15 minutes.     • O2 (OXYGEN) Inhale 2 L/min at night as needed.     • ondansetron (ZOFRAN) 8 MG tablet Take 8 mg by mouth 3 (Three) Times a Day As Needed for Nausea or Vomiting.     • ONE TOUCH ULTRA TEST test strip   0   • ONETOUCH DELICA LANCETS 33G misc   0   • pantoprazole (PROTONIX) 40 MG EC tablet Take 40 mg by mouth daily.     • polyethylene glycol (MIRALAX) packet Take 17 g by mouth daily as needed.     • potassium chloride (K-DUR) 10 MEQ CR tablet Take 10 mEq by mouth As Needed.  2   • pregabalin (LYRICA) 100 MG capsule Take 75 mg by mouth Every Night.     • sacubitril-valsartan (ENTRESTO) 24-26 MG tablet Take 1 tablet by mouth 2 (Two) Times a Day. 60 tablet 8   • SITagliptin (JANUVIA) 100 MG tablet Take 100 mg by mouth Daily.     • spironolactone (ALDACTONE) 25 MG tablet Take 25 mg by mouth Daily.     • SURE COMFORT INSULIN SYRINGE 31G X 5/16\" 1 ML misc   6   " "  • ticagrelor (BRILINTA) 90 MG tablet tablet Take 1 tablet by mouth 2 (Two) Times a Day. 60 tablet 8     No current facility-administered medications for this visit.        Effient [prasugrel]; Imdur [isosorbide dinitrate]; Livalo [pitavastatin]; and Ranolazine    Past Medical History:   Diagnosis Date   • Disease of thyroid gland 04/10/2009    small colloid cyst right thyroid   • Esophageal dilatation     Followed by Dr. Kyle   • Heel spurs removed    • Hypercholesterolemia    • Hypertension     Systemic   • IHD (ischemic heart disease)    • Other abnormal cytological findings on specimens from anus  & 2007    [facility]   • Pain     Chronic       Social History     Socioeconomic History   • Marital status:      Spouse name: Not on file   • Number of children: Not on file   • Years of education: Not on file   • Highest education level: Not on file   Tobacco Use   • Smoking status: Former Smoker     Last attempt to quit:      Years since quittin.4   • Smokeless tobacco: Current User     Types: Chew   • Tobacco comment: 2015   Substance and Sexual Activity   • Alcohol use: No   • Drug use: Defer   • Sexual activity: Defer       Family History   Problem Relation Age of Onset   • Heart disease Mother    • Stroke Mother    • Diabetes Mother    • Diabetes Father    • Cirrhosis Father    • Heart disease Other    • Diabetes Other        Review of Systems   Constitution: Positive for malaise/fatigue. Negative for decreased appetite, diaphoresis and fever.   HENT: Negative for nosebleeds.    Eyes: Positive for visual disturbance (r/t diabetes).   Cardiovascular: Positive for chest pain (\"better\"). Negative for leg swelling, near-syncope and palpitations.   Respiratory: Positive for shortness of breath. Negative for cough.    Endocrine: Positive for cold intolerance and heat intolerance.   Hematologic/Lymphatic: Negative for bleeding problem. Does not bruise/bleed easily.   Skin: Negative for dry skin, " "flushing and itching.   Musculoskeletal: Positive for arthritis and joint pain. Negative for muscle cramps.   Gastrointestinal: Negative for change in bowel habit, melena and nausea.   Genitourinary: Positive for nocturia. Negative for dysuria and hematuria.   Neurological: Positive for weakness (mild, same). Negative for loss of balance.   Psychiatric/Behavioral: Negative for memory loss. The patient is nervous/anxious.    Allergic/Immunologic: Negative for persistent infections.        Objective     /70 (BP Location: Left arm)   Pulse 74   Temp 98 °F (36.7 °C)   Ht 177.8 cm (70\")   Wt 96 kg (211 lb 9.6 oz)   BMI 30.36 kg/m²     Physical Exam   Constitutional: He is oriented to person, place, and time. He appears well-nourished.   HENT:   Head: Normocephalic.   Eyes: Pupils are equal, round, and reactive to light. Conjunctivae are normal.   Neck: Normal range of motion. Neck supple. Carotid bruit is not present.   Cardiovascular: Normal rate, regular rhythm, S1 normal and S2 normal.   No murmur heard.  Pulmonary/Chest: Breath sounds normal. He has no rales.   Abdominal: Soft. Bowel sounds are normal. There is no tenderness.   Musculoskeletal: Normal range of motion. He exhibits no edema.   Neurological: He is alert and oriented to person, place, and time.   Skin: Skin is warm and dry.   Psychiatric: He has a normal mood and affect. His behavior is normal.        Procedures: device interrogation today         Assessment/Plan      Adrian was seen today for follow-up, pacemaker check, palpitations and med refill.    Diagnoses and all orders for this visit:    Ischemic cardiomyopathy    Presence of biventricular implantable cardioverter-defibrillator (ICD)    Congestive heart failure with LV diastolic dysfunction, NYHA class 2 (CMS/HCC)    Coronary artery disease of native artery of native heart with stable angina pectoris (CMS/HCC)    Type 2 diabetes mellitus with other circulatory complication, with " long-term current use of insulin (CMS/[facility])    Hypercholesteremia    Medication management      Patient presents today without worsening symptoms of heart failure.  He is maintaining his normal daily activities.  Recently he has been under more stress as his wife is now on hospice care.  He has not been able to sleep much at night but denies worsening cardiac symptoms.  His blood pressure, heart rate, heart rhythm today are normal.  No change in current cardiac medications advised.  No refills needed.      A biventricular ICD interrogation to be done today.  If requires battery replacement, he will need to plan for wife to be placed in hospice unit during his procedure.     He follows with you for management of labs.  Please send copy of recent lab report.    Patient's Body mass index is 30.36 kg/m². BMI is above normal parameters. Recommendations include: nutrition counseling.     Adrian Samuel  reports that he quit smoking about 25 years ago. His smokeless tobacco use includes chew.     A 6-month follow-up visit scheduled.  At next visit we will plan to repeat stress and echo due to his cardiac history and risk.  Please call sooner for cardiac concerns.           Electronically signed by JONH Nolan,  June 17, 2020 12:40

## 2020-06-19 ENCOUNTER — TELEPHONE (OUTPATIENT)
Dept: CARDIOLOGY | Facility: CLINIC | Age: 65
End: 2020-06-19

## 2020-06-19 DIAGNOSIS — Z45.010 PACEMAKER GENERATOR END OF LIFE: Primary | ICD-10-CM

## 2020-06-19 NOTE — TELEPHONE ENCOUNTER
PPM recently checked, pt is needing referral for generator change. Pt would like it done locally due to wife being sick and currently under hospice care. Judith Dillardissa left message asking me to call her back. [phone]. Have been unable to notify.     Spoke with Jocelyn, patient is asking if we can coordinate Dr Fuentes's office visit with her being off work due to her mom being sick.  See Referral.

## 2020-08-05 ENCOUNTER — OFFICE VISIT (OUTPATIENT)
Dept: CARDIOLOGY | Facility: CLINIC | Age: 65
End: 2020-08-05

## 2020-08-05 DIAGNOSIS — I25.110 CORONARY ARTERY DISEASE INVOLVING NATIVE CORONARY ARTERY OF NATIVE HEART WITH UNSTABLE ANGINA PECTORIS (HCC): Primary | ICD-10-CM

## 2020-08-05 DIAGNOSIS — I50.30 CONGESTIVE HEART FAILURE WITH LV DIASTOLIC DYSFUNCTION, NYHA CLASS 2 (HCC): ICD-10-CM

## 2020-08-05 PROCEDURE — 93284 PRGRMG EVAL IMPLANTABLE DFB: CPT | Performed by: INTERNAL MEDICINE

## 2020-12-16 ENCOUNTER — OFFICE VISIT (OUTPATIENT)
Dept: CARDIOLOGY | Facility: CLINIC | Age: 65
End: 2020-12-16

## 2020-12-16 VITALS
HEART RATE: 64 BPM | DIASTOLIC BLOOD PRESSURE: 66 MMHG | BODY MASS INDEX: 28.86 KG/M2 | WEIGHT: 201.6 LBS | HEIGHT: 70 IN | SYSTOLIC BLOOD PRESSURE: 122 MMHG | TEMPERATURE: 96.5 F

## 2020-12-16 DIAGNOSIS — I25.5 ISCHEMIC CARDIOMYOPATHY: ICD-10-CM

## 2020-12-16 DIAGNOSIS — E78.00 HYPERCHOLESTEREMIA: ICD-10-CM

## 2020-12-16 DIAGNOSIS — I25.9 IHD (ISCHEMIC HEART DISEASE): Primary | ICD-10-CM

## 2020-12-16 DIAGNOSIS — I10 ESSENTIAL HYPERTENSION: ICD-10-CM

## 2020-12-16 DIAGNOSIS — E78.2 MIXED HYPERLIPIDEMIA: ICD-10-CM

## 2020-12-16 DIAGNOSIS — Z95.810 PRESENCE OF BIVENTRICULAR IMPLANTABLE CARDIOVERTER-DEFIBRILLATOR (ICD): ICD-10-CM

## 2020-12-16 DIAGNOSIS — I50.30 CONGESTIVE HEART FAILURE WITH LV DIASTOLIC DYSFUNCTION, NYHA CLASS 2 (HCC): ICD-10-CM

## 2020-12-16 PROCEDURE — 93290 INTERROG DEV EVAL ICPMS IP: CPT | Performed by: INTERNAL MEDICINE

## 2020-12-16 PROCEDURE — 99214 OFFICE O/P EST MOD 30 MIN: CPT | Performed by: NURSE PRACTITIONER

## 2020-12-16 PROCEDURE — 93289 INTERROG DEVICE EVAL HEART: CPT | Performed by: INTERNAL MEDICINE

## 2020-12-16 RX ORDER — NITROGLYCERIN 0.4 MG/1
0.4 TABLET SUBLINGUAL
Qty: 25 TABLET | Refills: 3 | Status: SHIPPED | OUTPATIENT
Start: 2020-12-16 | End: 2021-10-18

## 2020-12-16 RX ORDER — ATORVASTATIN CALCIUM 80 MG/1
80 TABLET, FILM COATED ORAL DAILY
Qty: 90 TABLET | Refills: 3 | Status: SHIPPED | OUTPATIENT
Start: 2020-12-16 | End: 2022-01-05 | Stop reason: SDUPTHER

## 2020-12-16 RX ORDER — NEBIVOLOL 5 MG/1
5 TABLET ORAL DAILY
Qty: 90 TABLET | Refills: 3 | Status: SHIPPED | OUTPATIENT
Start: 2020-12-16 | End: 2022-01-05 | Stop reason: SDUPTHER

## 2020-12-16 RX ORDER — SPIRONOLACTONE 25 MG/1
25 TABLET ORAL DAILY
Qty: 90 TABLET | Refills: 3 | Status: SHIPPED | OUTPATIENT
Start: 2020-12-16 | End: 2022-01-05

## 2020-12-16 RX ORDER — FENOFIBRATE 145 MG/1
145 TABLET, COATED ORAL DAILY
Qty: 90 TABLET | Refills: 3 | Status: SHIPPED | OUTPATIENT
Start: 2020-12-16 | End: 2022-01-05 | Stop reason: SDUPTHER

## 2020-12-16 NOTE — PROGRESS NOTES
Chief Complaint   Patient presents with   • Follow-up     Cardiac managment . Reports his wife has gotten sicker and he is trying to take care of her at home.   • Labs     PCP obtained labs recently , he said they were ok.   • Pacemaker Check     Interrogation today   • Chest Pain     Has occasional chest pains, requiring use of Nitroglycerin.    • Med Refill     Needs refills on all cardiac and cholesterol meds  90 day supply to [facility]       Subjective       Adrian Samuel is a 65 y.o. male with history of HTN, hyperlipidemia, IDDM, and complex cardiac problems for which he has had PTCA, bypass, and numerous caths in the past. A biventricular ICD placed in 2012. In March 2015, cath showed a lesion in the LAD after the LIMA and he had stenting of the same. In September 2015, echo showed his EF 30-35% with moderate to severe MR. He was supposed to had started Entresto, but apparently did not. He had been taking just losartan. In 2015, he was referred back to  heart failure clinic. Entresto restarted. In March 2017, repeat Lexiscan stress showed lateral wall ischemia. The dose of nitrodur patch increased. Echocardiogram showed LV function remained low with EF 35-40%. Cath recommended if symptoms persisted. On 5/2/18, he returned for follow up visit and device interrogated. He reported more short of breath and chest pain, worse with exertion and relieved by 1 to 3 sl nitro. BiV/ICD interrogation reported 5/2/18 was normal. A cardiac cath was recommended. He was referred to Dr. Fuentes for radial approach. LIMA was patent. Proximal SVG to PDA had stenosis and 3.5x30 Resolute stent was placed. LVEF 35% noted. November 2018 device interrogation showed fluid overload. Aldactone added. 11/13/18, stress showed inferolateral infarct with nba-infarct ischemia. LVEF 34%. Nitro patch was advised and cardiac cath via radial approach scheduled. On 1/11/19, Cath showed LVEF 30-35%, patent grafts with significantly depressed  "systolic function. Empiric therapy for ischemia and maxim therapy for cardiomyopathy recommended.    At last office visit device interrogation showed battery at [facility].  He was referred to Dr. Fuentes for generator change out.  On 8/5/2020 [place] biventricular ICD check showed 91% atrial and 89% ventricular pacing with no events noted.  Battery life greater than 6 years.    Today he comes to the office for a follow-up visit.  He denies new or worsening cardiac symptoms.  He does admit to being under quite a bit of stress as he is primary caregiver for his wife who is quite ill.  She continues to have hospice care.  No recent change in cardiac medications are noted.  He admits to occasionally using Nitrostat but \"not as much as before \".    Cardiac History:    Past Surgical History:   Procedure Laterality Date   • CARDIAC CATHETERIZATION  1993    Cath-PTCA.   • CARDIAC CATHETERIZATION  02/08/2005    Cath-() Patent Grafts   • CARDIAC CATHETERIZATION  08/01/2005    Cath-(Dr. Sigala) Patent Grafts.   • CARDIAC CATHETERIZATION  09/18/2007    Cath-100%LAD, 80%D2. 100%LCX, RCA. Patent [place] & SVG to PDA.   • CARDIAC CATHETERIZATION  08/22/2006    Cath-Patent Grafts   • CARDIAC CATHETERIZATION  01/15/2010    Cath-Patent LIMA & SVG to PDA. 80% OM1-2.25 x 16mm Taxus Stent   • CARDIAC CATHETERIZATION  02/14/2012    Cath-IVUS LAD 60-70%, 2.55 x 20mm Promus stent Ramus   • CARDIAC CATHETERIZATION  03/12/2013    Cath-3.5 x 28Promus stent SVG to RCA.   • CARDIAC CATHETERIZATION  11/25/2013    Cath-(Dr. Sigala) EF30-35%, occluded circ and graft but increase collaterol flow manage medically.   • CARDIAC CATHETERIZATION  12/22/2014    Cath-85% SVG to PDA-   • CARDIAC CATHETERIZATION  03/18/2015    Cath-90%LIMA to LAD-80% SVG to PDA-2.75 x 8 Resolute   • CARDIAC CATHETERIZATION  05/29/2018    80% SVG to PDA- 3.5x30 Resolute Stent.   • CARDIAC DEFIBRILLATOR PLACEMENT  2012    BiV/ICD, MENG   • CARDIOVASCULAR STRESS TEST  " 02/19/2008    D.Myoview-75%THR. EF 43%. InferoLateral Infarct with Nba-Infarct Ischemia   • CARDIOVASCULAR STRESS TEST  05/08/2009    D.Myoview-78%THR. EF48%. Inferiolateral wall infarct and nba-infarct ischemia.   • CARDIOVASCULAR STRESS TEST  03/03/2010    L.Myoview-Lateral Ischemia   • CARDIOVASCULAR STRESS TEST  02/13/2013    L.Myoview-lateral wall ischemia   • CARDIOVASCULAR STRESS TEST  11/11/2013    L.Myoview-lateral wall ischemia   • CARDIOVASCULAR STRESS TEST  11/18/2014    L.Myoview-([facility]) EF. Inferolateral Infarct   • CARDIOVASCULAR STRESS TEST  03/17/2015    L.Myoview-Sig Anterior Changes   • CARDIOVASCULAR STRESS TEST  03/15/2017    Lexiscan- lateral wall ischemia, nitrodur increased, cath if symptoms persist   • CARDIOVASCULAR STRESS TEST  11/13/2018    L. Cardiolite- EF 34%. Inferolateral Infarct with periinfarct Ischemia.   • CONVERTED (HISTORICAL) CARDIOVASCULAR STUDIES  09/05/2012    MUGA-RVEF 41%, LVEF 38%   • CONVERTED (HISTORICAL) HOLTER  06/11/2006    Holter-PVC's noted   • CORONARY ARTERY BYPASS GRAFT  1995    CABG LIMA TO LAD, SVG TO OM and SVG to PDA.   • ECHO - CONVERTED  08/29/2007    Echo-EF50-55%, mild to mod. MR, mildly hypokinetic septum.   • ECHO - CONVERTED  05/08/2009    Echo-EF45-49%, Moderate MR and PA pressure-42 mmHg.   • ECHO - CONVERTED  01/18/2012    Echo-EF 25-30%   • ECHO - CONVERTED  05/17/2012    Echo-EF<30%. AnteroSpectal WMA.   • ECHO - CONVERTED  09/02/2015    Echo-EF 30-35%, mod to severe MR   • ECHO - CONVERTED  03/15/2017    EF 35-40%, RVSP 25-30 mmHg   • ECHO - CONVERTED  11/13/2018    EF 35%. Inferolateral WMA. Mild- Mod MR. RVSP- 30 mmHg   • OTHER SURGICAL HISTORY  02/28/2014    U/S Aorta-   • US CAROTID UNILATERAL  05/21/2015    Carotid US-no hemodyanamically significant stenosis   • US CAROTID UNILATERAL  04/10/1989    Carotid US-No sig. stenosis noted       Current Outpatient Medications   Medication Sig Dispense Refill   • aspirin 81 MG EC tablet Take 81 mg  by mouth daily.     • atorvastatin (LIPITOR) 80 MG tablet Take 1 tablet by mouth Daily. 90 tablet 3   • baclofen (LIORESAL) 10 MG tablet Take 10 mg by mouth Daily As Needed for Muscle Spasms.     • carbidopa-levodopa (SINEMET)  MG per tablet Take 2 tablets by mouth 2 (Two) Times a Day.     • cetirizine (ZyrTEC) 10 MG tablet Take 10 mg by mouth daily.     • citalopram (CeleXA) 40 MG tablet Take 40 mg by mouth daily.     • fenofibrate (TRICOR) 145 MG tablet Take 1 tablet by mouth Daily. 90 tablet 3   • HYDROcodone-acetaminophen (NORCO)  MG per tablet Take 1 tablet by mouth Every 6 (Six) Hours As Needed for Moderate Pain .     • Insulin Aspart Prot & Aspart (NOVOLOG MIX 70/30 PENFILL SC) Inject  under the skin.     • Melatonin 3 MG tablet Take 3 mg by mouth At Night As Needed for Sleep.     • nebivolol (BYSTOLIC) 5 MG tablet Take 1 tablet by mouth Daily. 90 tablet 3   • nitroglycerin (NITRODUR) 0.6 MG/HR patch Place 1 patch on the skin as directed by provider Daily. 30 patch 8   • nitroglycerin (NITROSTAT) 0.4 MG SL tablet Place 1 tablet under the tongue Every 5 (Five) Minutes As Needed for Chest Pain. Take no more than 3 doses in 15 minutes. 25 tablet 3   • O2 (OXYGEN) Inhale 2 L/min at night as needed.     • ondansetron (ZOFRAN) 8 MG tablet Take 8 mg by mouth 3 (Three) Times a Day As Needed for Nausea or Vomiting.     • ONE TOUCH ULTRA TEST test strip   0   • ONETOUCH DELICA LANCETS 33G misc   0   • pantoprazole (PROTONIX) 40 MG EC tablet Take 40 mg by mouth daily.     • polyethylene glycol (MIRALAX) packet Take 17 g by mouth daily as needed.     • potassium chloride (K-DUR) 10 MEQ CR tablet Take 10 mEq by mouth As Needed.  2   • pregabalin (LYRICA) 100 MG capsule Take 75 mg by mouth Every Night.     • sacubitril-valsartan (ENTRESTO) 24-26 MG tablet Take 1 tablet by mouth 2 (Two) Times a Day. 180 tablet 3   • SITagliptin (JANUVIA) 100 MG tablet Take 100 mg by mouth Daily.     • spironolactone (ALDACTONE)  "25 MG tablet Take 1 tablet by mouth Daily. 90 tablet 3   • SURE COMFORT INSULIN SYRINGE 31G X \" 1 ML misc   6   • ticagrelor (Brilinta) 90 MG tablet tablet Take 1 tablet by mouth 2 (Two) Times a Day. 180 tablet 3     No current facility-administered medications for this visit.        Effient [prasugrel], Imdur [isosorbide dinitrate], Livalo [pitavastatin], and Ranolazine    Past Medical History:   Diagnosis Date   • Disease of thyroid gland 04/10/2009    small colloid cyst right thyroid   • Esophageal dilatation     Followed by Dr. Kyle   • Heel spurs removed    • Hypercholesterolemia    • Hypertension     Systemic   • IHD (ischemic heart disease)    • Other abnormal cytological findings on specimens from anus  &     [facility]   • Pain     Chronic       Social History     Socioeconomic History   • Marital status:      Spouse name: Not on file   • Number of children: Not on file   • Years of education: Not on file   • Highest education level: Not on file   Tobacco Use   • Smoking status: Former Smoker     Quit date:      Years since quittin.9   • Smokeless tobacco: Current User     Types: Chew   • Tobacco comment: 2015   Substance and Sexual Activity   • Alcohol use: No   • Drug use: Defer   • Sexual activity: Defer       Family History   Problem Relation Age of Onset   • Heart disease Mother    • Stroke Mother    • Diabetes Mother    • Diabetes Father    • Cirrhosis Father    • Heart disease Other    • Diabetes Other        Review of Systems   Constitution: Negative for decreased appetite, diaphoresis and malaise/fatigue.   HENT: Negative for nosebleeds.    Eyes: Positive for visual disturbance (not a new problem, r/t diabetes). Negative for blurred vision.   Cardiovascular: Positive for chest pain (not a new or worsening problem). Negative for claudication, cyanosis, dyspnea on exertion, irregular heartbeat, leg swelling, near-syncope, orthopnea, palpitations, paroxysmal nocturnal " "dyspnea and syncope.   Respiratory: Positive for shortness of breath (with exertion) and sleep disturbances due to breathing (uses oxygen at night).    Endocrine: Negative for cold intolerance and heat intolerance.   Hematologic/Lymphatic: Negative for bleeding problem. Does not bruise/bleed easily.   Skin: Negative for rash.   Musculoskeletal: Negative for myalgias.   Gastrointestinal: Positive for constipation (medication helps manage). Negative for heartburn, melena and nausea.   Genitourinary: Negative for dysuria and hematuria.   Neurological: Negative for dizziness and light-headedness.   Psychiatric/Behavioral: The patient does not have insomnia and is not nervous/anxious.         BP Readings from Last 5 Encounters:   12/16/20 122/66   06/17/20 120/70   12/04/19 124/80   06/05/19 130/70   02/13/19 116/64       Wt Readings from Last 5 Encounters:   12/16/20 91.4 kg (201 lb 9.6 oz)   06/17/20 96 kg (211 lb 9.6 oz)   12/04/19 97.1 kg (214 lb)   06/05/19 95.4 kg (210 lb 6.4 oz)   02/13/19 99.4 kg (219 lb 3.2 oz)         Objective     /66 (BP Location: Right arm)   Pulse 64   Temp 96.5 °F (35.8 °C)   Ht 177.8 cm (70\")   Wt 91.4 kg (201 lb 9.6 oz)   BMI 28.93 kg/m²     Vitals signs and nursing note reviewed.   Eyes:      Pupils: Pupils are equal, round, and reactive to light.   HENT:      Head: Normocephalic.   Neck:      Musculoskeletal: Normal range of motion.   Pulmonary:      Breath sounds: Normal breath sounds.   Cardiovascular:      Normal rate. Regular rhythm.   Edema:     Peripheral edema absent.   Abdominal:      General: Bowel sounds are normal.      Palpations: Abdomen is soft.   Musculoskeletal: Normal range of motion.   Skin:     General: Skin is warm.   Neurological:      Mental Status: Alert and oriented to person, place, and time.          Procedures: ICD interrogation done today       Problem List Items Addressed This Visit        Cardiovascular and Mediastinum    Congestive heart " failure with LV diastolic dysfunction, NYHA class 2 (CMS/HCC)    Relevant Medications    ticagrelor (Brilinta) 90 MG tablet tablet    sacubitril-valsartan (ENTRESTO) 24-26 MG tablet    nitroglycerin (NITROSTAT) 0.4 MG SL tablet    nebivolol (BYSTOLIC) 5 MG tablet    Hypercholesteremia    Relevant Medications    fenofibrate (TRICOR) 145 MG tablet    atorvastatin (LIPITOR) 80 MG tablet    Cardiomyopathy (CMS/HCC)    Relevant Medications    ticagrelor (Brilinta) 90 MG tablet tablet    sacubitril-valsartan (ENTRESTO) 24-26 MG tablet    nitroglycerin (NITROSTAT) 0.4 MG SL tablet    nebivolol (BYSTOLIC) 5 MG tablet    IHD (ischemic heart disease) - Primary    Relevant Medications    ticagrelor (Brilinta) 90 MG tablet tablet    sacubitril-valsartan (ENTRESTO) 24-26 MG tablet    nitroglycerin (NITROSTAT) 0.4 MG SL tablet    nebivolol (BYSTOLIC) 5 MG tablet    Presence of biventricular implantable cardioverter-defibrillator (ICD)      Other Visit Diagnoses     Essential hypertension        Relevant Medications    spironolactone (ALDACTONE) 25 MG tablet    nebivolol (BYSTOLIC) 5 MG tablet    Mixed hyperlipidemia        Relevant Medications    fenofibrate (TRICOR) 145 MG tablet    atorvastatin (LIPITOR) 80 MG tablet           Assessment/Plan     Diagnoses and all orders for this visit:    1. IHD (ischemic heart disease) (Primary)  -     ticagrelor (Brilinta) 90 MG tablet tablet; Take 1 tablet by mouth 2 (Two) Times a Day.  Dispense: 180 tablet; Refill: 3    2. Ischemic cardiomyopathy    3. Congestive heart failure with LV diastolic dysfunction, NYHA class 2 (CMS/HCC)  -     sacubitril-valsartan (ENTRESTO) 24-26 MG tablet; Take 1 tablet by mouth 2 (Two) Times a Day.  Dispense: 180 tablet; Refill: 3    4. Presence of biventricular implantable cardioverter-defibrillator (ICD)    5. Hypercholesteremia    6. Essential hypertension  -     nebivolol (BYSTOLIC) 5 MG tablet; Take 1 tablet by mouth Daily.  Dispense: 90 tablet; Refill:  3    7. Mixed hyperlipidemia  -     fenofibrate (TRICOR) 145 MG tablet; Take 1 tablet by mouth Daily.  Dispense: 90 tablet; Refill: 3  -     atorvastatin (LIPITOR) 80 MG tablet; Take 1 tablet by mouth Daily.  Dispense: 90 tablet; Refill: 3    Other orders  -     spironolactone (ALDACTONE) 25 MG tablet; Take 1 tablet by mouth Daily.  Dispense: 90 tablet; Refill: 3  -     nitroglycerin (NITROSTAT) 0.4 MG SL tablet; Place 1 tablet under the tongue Every 5 (Five) Minutes As Needed for Chest Pain. Take no more than 3 doses in 15 minutes.  Dispense: 25 tablet; Refill: 3      Pacemaker interrogation today showed normal function with no significant arrhythmias noted, only sinus tach.  His blood pressure is normal.  According to patient his blood pressure has been good at home.  He denies new or worsening cardiac symptoms therefore no repeat cardiac testing ordered at this time.  Should any symptoms worsen or new problems develop he understands to call and repeat cardiac work-up will be considered.  No change in current cardiac medications advised.  Refills faxed to his pharmacy.    Patient's Body mass index is 28.93 kg/m². BMI is above normal parameters. Recommendations include: nutrition counseling.     Adrian Samuel  reports that he quit smoking about 25 years ago. His smokeless tobacco use includes chew.  I encouraged him to avoid tobacco products.    He will follow with you for lab orders.  I do not have a recent copy of lab results, please forward to the office?     A 6-month follow-up along with ICD interrogation scheduled.             Electronically signed by JONH Nolan,  December 16, 2020 13:28 EST

## 2021-07-21 ENCOUNTER — OFFICE VISIT (OUTPATIENT)
Dept: CARDIOLOGY | Facility: CLINIC | Age: 66
End: 2021-07-21

## 2021-07-21 VITALS
BODY MASS INDEX: 27.63 KG/M2 | HEART RATE: 60 BPM | DIASTOLIC BLOOD PRESSURE: 60 MMHG | SYSTOLIC BLOOD PRESSURE: 130 MMHG | HEIGHT: 70 IN | WEIGHT: 193 LBS

## 2021-07-21 DIAGNOSIS — I50.30 CONGESTIVE HEART FAILURE WITH LV DIASTOLIC DYSFUNCTION, NYHA CLASS 2 (HCC): ICD-10-CM

## 2021-07-21 DIAGNOSIS — I25.9 IHD (ISCHEMIC HEART DISEASE): Primary | ICD-10-CM

## 2021-07-21 DIAGNOSIS — R06.02 SHORTNESS OF BREATH: ICD-10-CM

## 2021-07-21 DIAGNOSIS — Z95.810 PRESENCE OF BIVENTRICULAR IMPLANTABLE CARDIOVERTER-DEFIBRILLATOR (ICD): ICD-10-CM

## 2021-07-21 DIAGNOSIS — R07.89 OTHER CHEST PAIN: ICD-10-CM

## 2021-07-21 DIAGNOSIS — I25.9 IHD (ISCHEMIC HEART DISEASE): ICD-10-CM

## 2021-07-21 DIAGNOSIS — R42 DIZZINESS: ICD-10-CM

## 2021-07-21 DIAGNOSIS — I25.5 ISCHEMIC CARDIOMYOPATHY: Primary | ICD-10-CM

## 2021-07-21 PROCEDURE — 99214 OFFICE O/P EST MOD 30 MIN: CPT | Performed by: NURSE PRACTITIONER

## 2021-07-21 PROCEDURE — 93284 PRGRMG EVAL IMPLANTABLE DFB: CPT | Performed by: INTERNAL MEDICINE

## 2021-07-21 NOTE — PROGRESS NOTES
"Chief Complaint   Patient presents with   • Follow-up     Cardiac management . Patients wife passed away in february 2021   • Pacemaker Check     St.Cam interrogation today   • Chest Pain     Reports having chest tightness for a few days, he had ran out of Entresto  for a few days . He says his heart was \"feeling  funny \"before meds ran out . He is  aware to call for refills if needed in between appointment time.   • LABS     Had labs per PCP, patient said glucose was up . No med changes at this time   • Med Refill     No refills needed today.       Subjective       Adrian Samuel is a 65 y.o. male with history of HTN, hyperlipidemia, IDDM, and complex cardiac problems for which he has had PTCA, bypass, and numerous caths in the past. A biventricular ICD placed in 2012. In March 2015, cath showed a lesion in the LAD after the LIMA and he had stenting of the same. In September 2015, echo showed his EF 30-35% with moderate to severe MR. He was supposed to had started Entresto, but apparently did not. He had been taking just losartan. In 2015, he was referred back to  heart failure clinic. Entresto restarted. In March 2017, repeat Lexiscan stress showed lateral wall ischemia. The dose of nitrodur patch increased. Echocardiogram showed LV function remained low with EF 35-40%. Cath recommended if symptoms persisted. On 5/2/18, he returned for follow up visit and device interrogated. He reported more short of breath and chest pain, worse with exertion and relieved by 1 to 3 sl nitro. BiV/ICD interrogation reported 5/2/18 was normal. A cardiac cath was recommended. He was referred to Dr. Fuentes for radial approach. LIMA was patent. Proximal SVG to PDA had stenosis and 3.5x30 Resolute stent was placed. LVEF 35% noted. November 2018 device interrogation showed fluid overload. Aldactone added. 11/13/18, stress showed inferolateral infarct with nba-infarct ischemia. LVEF 34%. Nitro patch was advised and cardiac cath via " radial approach scheduled. On 1/11/19, Cath showed LVEF 30-35%, patent grafts with significantly depressed systolic function. Empiric therapy for ischemia and maxim therapy for cardiomyopathy recommended.  In July 2020 he underwent generator change out.Saint Cam device check in December 2020 showed normal function with no changes made.  Battery life estimated at 6.5 years.    Today returns to the office for a follow-up visit and device interrogation.  In February of this year he experienced the loss of his wife.  He has been under more stress since that time.  He has noticed some episodes of chest discomfort in form of pressure.  At times it is a thumping type discomfort and radiates into his neck.  This symptom he feels is coming from his pacemaker.  He has dizziness and shortness of breath.  No near syncopal episodes.  No significant swelling.    Cardiac History:    Past Surgical History:   Procedure Laterality Date   • CARDIAC CATHETERIZATION  1993    Cath-PTCA.   • CARDIAC CATHETERIZATION  02/08/2005    Cath-() Patent Grafts   • CARDIAC CATHETERIZATION  08/01/2005    Cath-(Dr. Sigala) Patent Grafts.   • CARDIAC CATHETERIZATION  09/18/2007    Cath-100%LAD, 80%D2. 100%LCX, RCA. Patent Lima & SVG to PDA.   • CARDIAC CATHETERIZATION  08/22/2006    Cath-Patent Grafts   • CARDIAC CATHETERIZATION  01/15/2010    Cath-Patent LIMA & SVG to PDA. 80% OM1-2.25 x 16mm Taxus Stent   • CARDIAC CATHETERIZATION  02/14/2012    Cath-IVUS LAD 60-70%, 2.55 x 20mm Promus stent Ramus   • CARDIAC CATHETERIZATION  03/12/2013    Cath-3.5 x 28Promus stent SVG to RCA.   • CARDIAC CATHETERIZATION  11/25/2013    Cath-(Dr. Sigala) EF30-35%, occluded circ and graft but increase collaterol flow manage medically.   • CARDIAC CATHETERIZATION  12/22/2014    Cath-85% SVG to PDA-   • CARDIAC CATHETERIZATION  03/18/2015    Cath-90%LIMA to LAD-80% SVG to PDA-2.75 x 8 Resolute   • CARDIAC CATHETERIZATION  05/29/2018    80% SVG to PDA-  3.5x30 Resolute Stent.   • CARDIAC DEFIBRILLATOR PLACEMENT  2012    BiV/ICD, [facility]   • CARDIOVASCULAR STRESS TEST  02/19/2008    D.Myoview-75%THR. EF 43%. InferoLateral Infarct with Nba-Infarct Ischemia   • CARDIOVASCULAR STRESS TEST  05/08/2009    D.Myoview-78%THR. EF48%. Inferiolateral wall infarct and nba-infarct ischemia.   • CARDIOVASCULAR STRESS TEST  03/03/2010    L.Myoview-Lateral Ischemia   • CARDIOVASCULAR STRESS TEST  02/13/2013    L.Myoview-lateral wall ischemia   • CARDIOVASCULAR STRESS TEST  11/11/2013    L.Myoview-lateral wall ischemia   • CARDIOVASCULAR STRESS TEST  11/18/2014    L.Myoview-([facility]) EF. Inferolateral Infarct   • CARDIOVASCULAR STRESS TEST  03/17/2015    L.Myoview-Sig Anterior Changes   • CARDIOVASCULAR STRESS TEST  03/15/2017    Lexiscan- lateral wall ischemia, nitrodur increased, cath if symptoms persist   • CARDIOVASCULAR STRESS TEST  11/13/2018    L. Cardiolite- EF 34%. Inferolateral Infarct with periinfarct Ischemia.   • CONVERTED (HISTORICAL) CARDIOVASCULAR STUDIES  09/05/2012    MUGA-RVEF 41%, LVEF 38%   • CONVERTED (HISTORICAL) HOLTER  06/11/2006    Holter-PVC's noted   • CORONARY ARTERY BYPASS GRAFT  1995    CABG LIMA TO LAD, SVG TO OM and SVG to PDA.   • ECHO - CONVERTED  08/29/2007    Echo-EF50-55%, mild to mod. MR, mildly hypokinetic septum.   • ECHO - CONVERTED  05/08/2009    Echo-EF45-49%, Moderate MR and PA pressure-42 mmHg.   • ECHO - CONVERTED  01/18/2012    Echo-EF 25-30%   • ECHO - CONVERTED  05/17/2012    Echo-EF<30%. AnteroSpectal WMA.   • ECHO - CONVERTED  09/02/2015    Echo-EF 30-35%, mod to severe MR   • ECHO - CONVERTED  03/15/2017    EF 35-40%, RVSP 25-30 mmHg   • ECHO - CONVERTED  11/13/2018    EF 35%. Inferolateral WMA. Mild- Mod MR. RVSP- 30 mmHg   • OTHER SURGICAL HISTORY  02/28/2014    U/S Aorta-   • US CAROTID UNILATERAL  05/21/2015    Carotid US-no hemodyanamically significant stenosis   • US CAROTID UNILATERAL  04/10/1989    Carotid US-No sig. stenosis  noted       Current Outpatient Medications   Medication Sig Dispense Refill   • aspirin 81 MG EC tablet Take 81 mg by mouth daily.     • atorvastatin (LIPITOR) 80 MG tablet Take 1 tablet by mouth Daily. 90 tablet 3   • baclofen (LIORESAL) 10 MG tablet Take 10 mg by mouth Daily As Needed for Muscle Spasms.     • carbidopa-levodopa (SINEMET)  MG per tablet Take 2 tablets by mouth 2 (Two) Times a Day.     • cetirizine (ZyrTEC) 10 MG tablet Take 10 mg by mouth daily.     • citalopram (CeleXA) 40 MG tablet Take 40 mg by mouth daily.     • fenofibrate (TRICOR) 145 MG tablet Take 1 tablet by mouth Daily. 90 tablet 3   • HYDROcodone-acetaminophen (NORCO)  MG per tablet Take 1 tablet by mouth Every 6 (Six) Hours As Needed for Moderate Pain .     • Insulin Aspart Prot & Aspart (NOVOLOG MIX 70/30 PENFILL SC) Inject  under the skin.     • Melatonin 3 MG tablet Take 3 mg by mouth At Night As Needed for Sleep.     • nebivolol (BYSTOLIC) 5 MG tablet Take 1 tablet by mouth Daily. 90 tablet 3   • nitroglycerin (NITRODUR) 0.6 MG/HR patch Place 1 patch on the skin as directed by provider Daily. 30 patch 8   • nitroglycerin (NITROSTAT) 0.4 MG SL tablet Place 1 tablet under the tongue Every 5 (Five) Minutes As Needed for Chest Pain. Take no more than 3 doses in 15 minutes. 25 tablet 3   • O2 (OXYGEN) Inhale 2 L/min at night as needed.     • ondansetron (ZOFRAN) 8 MG tablet Take 8 mg by mouth 3 (Three) Times a Day As Needed for Nausea or Vomiting.     • ONE TOUCH ULTRA TEST test strip   0   • ONETOUCH DELICA LANCETS 33G misc   0   • pantoprazole (PROTONIX) 40 MG EC tablet Take 40 mg by mouth daily.     • polyethylene glycol (MIRALAX) packet Take 17 g by mouth daily as needed.     • potassium chloride (K-DUR) 10 MEQ CR tablet Take 10 mEq by mouth As Needed.  2   • pregabalin (LYRICA) 100 MG capsule Take 75 mg by mouth Every Night.     • sacubitril-valsartan (ENTRESTO) 24-26 MG tablet Take 1 tablet by mouth 2 (Two) Times a Day.  "180 tablet 3   • SITagliptin (JANUVIA) 100 MG tablet Take 100 mg by mouth Daily.     • spironolactone (ALDACTONE) 25 MG tablet Take 1 tablet by mouth Daily. 90 tablet 3   • SURE COMFORT INSULIN SYRINGE 31G X \" 1 ML misc   6   • ticagrelor (Brilinta) 90 MG tablet tablet Take 1 tablet by mouth 2 (Two) Times a Day. 180 tablet 3     No current facility-administered medications for this visit.       Effient [prasugrel], Imdur [isosorbide dinitrate], Livalo [pitavastatin], and Ranolazine    Past Medical History:   Diagnosis Date   • Disease of thyroid gland 04/10/2009    small colloid cyst right thyroid   • Esophageal dilatation     Followed by Dr. Kyle   • Heel spurs removed    • Hypercholesterolemia    • Hypertension     Systemic   • IHD (ischemic heart disease)    • Other abnormal cytological findings on specimens from anus  &     [facility]   • Pain     Chronic       Social History     Socioeconomic History   • Marital status:      Spouse name: Not on file   • Number of children: Not on file   • Years of education: Not on file   • Highest education level: Not on file   Tobacco Use   • Smoking status: Former Smoker     Quit date:      Years since quittin.5   • Smokeless tobacco: Current User     Types: Chew   • Tobacco comment: 2015   Vaping Use   • Vaping Use: Never used   Substance and Sexual Activity   • Alcohol use: No   • Drug use: Defer   • Sexual activity: Defer       Family History   Problem Relation Age of Onset   • Heart disease Mother    • Stroke Mother    • Diabetes Mother    • Diabetes Father    • Cirrhosis Father    • Heart disease Other    • Diabetes Other        Review of Systems   Constitutional: Positive for malaise/fatigue. Negative for decreased appetite and diaphoresis.   HENT: Negative for nosebleeds.    Eyes: Negative for blurred vision.   Cardiovascular: Positive for chest pain and palpitations. Negative for claudication, cyanosis, dyspnea on exertion, irregular " "heartbeat, leg swelling, near-syncope, orthopnea, paroxysmal nocturnal dyspnea and syncope.   Respiratory: Positive for shortness of breath. Negative for sleep disturbances due to breathing and snoring.    Endocrine: Negative for cold intolerance and heat intolerance.   Hematologic/Lymphatic: Negative for adenopathy and bleeding problem. Does not bruise/bleed easily.   Skin: Negative for rash.   Musculoskeletal: Positive for joint pain and muscle cramps. Negative for falls and myalgias.   Gastrointestinal: Negative for heartburn, melena and nausea.   Genitourinary: Negative for dysuria and hematuria.   Neurological: Positive for light-headedness and numbness (Lower extremities relates to diabetes). Negative for dizziness and loss of balance.   Psychiatric/Behavioral: The patient does not have insomnia and is not nervous/anxious.         BP Readings from Last 5 Encounters:   07/21/21 130/60   12/16/20 122/66   06/17/20 120/70   12/04/19 124/80   06/05/19 130/70       Wt Readings from Last 5 Encounters:   07/21/21 87.5 kg (193 lb)   12/16/20 91.4 kg (201 lb 9.6 oz)   06/17/20 96 kg (211 lb 9.6 oz)   12/04/19 97.1 kg (214 lb)   06/05/19 95.4 kg (210 lb 6.4 oz)       Objective     /60 (BP Location: Left arm)   Pulse 60   Ht 177.8 cm (70\")   Wt 87.5 kg (193 lb)   BMI 27.69 kg/m²     Vitals and nursing note reviewed.   Eyes:      Pupils: Pupils are equal, round, and reactive to light.   HENT:      Head: Normocephalic.   Neck:      Thyroid: No thyroid tenderness.      Vascular: No carotid bruit or JVD.   Pulmonary:      Effort: Pulmonary effort is normal.      Breath sounds: Normal breath sounds.   Cardiovascular:      Normal rate. Regular rhythm.      Murmurs: There is a grade 3/6 low frequency systolic murmur.   Pulses:     Intact distal pulses.   Edema:     Peripheral edema absent.   Abdominal:      General: Bowel sounds are normal.      Palpations: Abdomen is soft.   Musculoskeletal: Normal range of motion. "      Cervical back: Normal range of motion. Skin:     General: Skin is warm.   Neurological:      Mental Status: Alert and oriented to person, place, and time.          Procedures: Pacemaker interrogation done today.         Assessment/Plan   Diagnoses and all orders for this visit:    1. Ischemic cardiomyopathy (Primary)  -     Adult Transthoracic Echo Complete W/ Cont if Necessary Per Protocol; Future  -     Stress Test With Myocardial Perfusion One Day; Future    2. Congestive heart failure with LV diastolic dysfunction, NYHA class 2 (CMS/HCC)  -     Adult Transthoracic Echo Complete W/ Cont if Necessary Per Protocol; Future  -     Stress Test With Myocardial Perfusion One Day; Future    3. IHD (ischemic heart disease)  -     Adult Transthoracic Echo Complete W/ Cont if Necessary Per Protocol; Future  -     Stress Test With Myocardial Perfusion One Day; Future    4. Presence of biventricular implantable cardioverter-defibrillator (ICD)  -     Adult Transthoracic Echo Complete W/ Cont if Necessary Per Protocol; Future  -     Stress Test With Myocardial Perfusion One Day; Future    5. Dizziness  -     Adult Transthoracic Echo Complete W/ Cont if Necessary Per Protocol; Future  -     Stress Test With Myocardial Perfusion One Day; Future  -     US Carotid Bilateral; Future    6. Other chest pain  -     Adult Transthoracic Echo Complete W/ Cont if Necessary Per Protocol; Future  -     Stress Test With Myocardial Perfusion One Day; Future  -     US Carotid Bilateral; Future    7. Shortness of breath  -     Adult Transthoracic Echo Complete W/ Cont if Necessary Per Protocol; Future  -     Stress Test With Myocardial Perfusion One Day; Future      IHD/ischemic cardiomyopathy-patient presents today with worsening symptoms of chest discomfort/pain, dizziness, shortness of breath.  His last cardiac work-up was in November 2018.  Due to his recent symptoms and significant cardiac history as well as risk factors repeat  cardiac work-up advised.  Lexiscan stress test and echocardiogram ordered.  Continue Brilinta, aspirin, statin and fenofibrate, Entresto, long-acting nitrate, and antihypertensive agents.  Nitrostat is up-to-date.    Due to dizziness a carotid ultrasound ordered to evaluate for stenosis being causative factor.    Biventricular ICD-interrogation today showed normal function.  Due to symptoms AV delay was adjusted.  This may improve chest wall/palpitations symptoms.    Patient's Body mass index is 27.69 kg/m². indicating that he is overweight (BMI 25-29.9). Obesity-related health conditions include the following: hypertension, coronary heart disease, diabetes mellitus and dyslipidemias. Obesity is unchanged. BMI is is above average; BMI management plan is completed. We discussed low calorie, low carb based diet program..     Further recommendations based on test results.  A device interrogation scheduled to be done in 6 months.  A follow-up visit scheduled also scheduled for 6 months.     Condolences were given for the recent loss of his wife.           Electronically signed by JONH Nolan,  July 21, 2021 14:58 EDT

## 2021-08-23 ENCOUNTER — HOSPITAL ENCOUNTER (OUTPATIENT)
Dept: CARDIOLOGY | Facility: HOSPITAL | Age: 66
Discharge: HOME | End: 2021-08-23

## 2021-08-23 DIAGNOSIS — Z95.810 PRESENCE OF BIVENTRICULAR IMPLANTABLE CARDIOVERTER-DEFIBRILLATOR (ICD): ICD-10-CM

## 2021-08-23 DIAGNOSIS — R42 DIZZINESS: ICD-10-CM

## 2021-08-23 DIAGNOSIS — R07.89 OTHER CHEST PAIN: ICD-10-CM

## 2021-08-23 DIAGNOSIS — R06.02 SHORTNESS OF BREATH: ICD-10-CM

## 2021-08-23 DIAGNOSIS — I50.30 CONGESTIVE HEART FAILURE WITH LV DIASTOLIC DYSFUNCTION, NYHA CLASS 2 (HCC): ICD-10-CM

## 2021-08-23 DIAGNOSIS — I25.9 IHD (ISCHEMIC HEART DISEASE): ICD-10-CM

## 2021-08-23 DIAGNOSIS — I25.5 ISCHEMIC CARDIOMYOPATHY: ICD-10-CM

## 2021-08-23 LAB
BH CV ECHO MEAS - ACS: 1.8 CM
BH CV ECHO MEAS - AO MAX PG: 4.8 MMHG
BH CV ECHO MEAS - AO MEAN PG: 3 MMHG
BH CV ECHO MEAS - AO ROOT AREA (BSA CORRECTED): 1.6
BH CV ECHO MEAS - AO ROOT AREA: 8 CM^2
BH CV ECHO MEAS - AO ROOT DIAM: 3.2 CM
BH CV ECHO MEAS - AO V2 MAX: 109 CM/SEC
BH CV ECHO MEAS - AO V2 MEAN: 79.3 CM/SEC
BH CV ECHO MEAS - AO V2 VTI: 21.2 CM
BH CV ECHO MEAS - BSA(HAYCOCK): 2.1 M^2
BH CV ECHO MEAS - BSA: 2.1 M^2
BH CV ECHO MEAS - BZI_BMI: 27.7 KILOGRAMS/M^2
BH CV ECHO MEAS - BZI_METRIC_HEIGHT: 177.8 CM
BH CV ECHO MEAS - BZI_METRIC_WEIGHT: 87.5 KG
BH CV ECHO MEAS - EDV(CUBED): 197.1 ML
BH CV ECHO MEAS - EDV(MOD-SP4): 189 ML
BH CV ECHO MEAS - EDV(TEICH): 167.9 ML
BH CV ECHO MEAS - EF(CUBED): 29.5 %
BH CV ECHO MEAS - EF(MOD-SP4): 34.9 %
BH CV ECHO MEAS - EF(TEICH): 23.5 %
BH CV ECHO MEAS - ESV(CUBED): 139 ML
BH CV ECHO MEAS - ESV(MOD-SP4): 123 ML
BH CV ECHO MEAS - ESV(TEICH): 128.4 ML
BH CV ECHO MEAS - FS: 11 %
BH CV ECHO MEAS - IVS/LVPW: 0.69
BH CV ECHO MEAS - IVSD: 1.2 CM
BH CV ECHO MEAS - LA DIMENSION: 4.7 CM
BH CV ECHO MEAS - LA/AO: 1.5
BH CV ECHO MEAS - LV DIASTOLIC VOL/BSA (35-75): 91.9 ML/M^2
BH CV ECHO MEAS - LV IVRT: 0.16 SEC
BH CV ECHO MEAS - LV MASS(C)D: 411.3 GRAMS
BH CV ECHO MEAS - LV MASS(C)DI: 200.1 GRAMS/M^2
BH CV ECHO MEAS - LV SYSTOLIC VOL/BSA (12-30): 59.8 ML/M^2
BH CV ECHO MEAS - LVIDD: 5.8 CM
BH CV ECHO MEAS - LVIDS: 5.2 CM
BH CV ECHO MEAS - LVLD AP4: 7.5 CM
BH CV ECHO MEAS - LVLS AP4: 6.8 CM
BH CV ECHO MEAS - LVOT AREA (M): 4.2 CM^2
BH CV ECHO MEAS - LVOT AREA: 4.2 CM^2
BH CV ECHO MEAS - LVOT DIAM: 2.3 CM
BH CV ECHO MEAS - LVPWD: 1.8 CM
BH CV ECHO MEAS - MV A MAX VEL: 94.7 CM/SEC
BH CV ECHO MEAS - MV DEC SLOPE: 192 CM/SEC^2
BH CV ECHO MEAS - MV E MAX VEL: 66 CM/SEC
BH CV ECHO MEAS - MV E/A: 0.7
BH CV ECHO MEAS - RAP SYSTOLE: 10 MMHG
BH CV ECHO MEAS - RVDD: 2.4 CM
BH CV ECHO MEAS - RVSP: 28 MMHG
BH CV ECHO MEAS - SI(AO): 82.9 ML/M^2
BH CV ECHO MEAS - SI(CUBED): 28.3 ML/M^2
BH CV ECHO MEAS - SI(MOD-SP4): 32.1 ML/M^2
BH CV ECHO MEAS - SI(TEICH): 19.2 ML/M^2
BH CV ECHO MEAS - SV(AO): 170.5 ML
BH CV ECHO MEAS - SV(CUBED): 58.1 ML
BH CV ECHO MEAS - SV(MOD-SP4): 66 ML
BH CV ECHO MEAS - SV(TEICH): 39.5 ML
BH CV ECHO MEAS - TR MAX VEL: 212 CM/SEC
BH CV REST NUCLEAR ISOTOPE DOSE: 10 MCI
BH CV STRESS COMMENTS STAGE 1: NORMAL
BH CV STRESS DOSE REGADENOSON STAGE 1: 0.4
BH CV STRESS DURATION MIN STAGE 1: 0
BH CV STRESS DURATION SEC STAGE 1: 10
BH CV STRESS NUCLEAR ISOTOPE DOSE: 30 MCI
BH CV STRESS PROTOCOL 1: NORMAL
BH CV STRESS RECOVERY BP: NORMAL MMHG
BH CV STRESS RECOVERY HR: 74 BPM
BH CV STRESS STAGE 1: 1
LV EF NUC BP: 34 %
MAXIMAL PREDICTED HEART RATE: 153 BPM
MAXIMAL PREDICTED HEART RATE: 153 BPM
PERCENT MAX PREDICTED HR: 47.71 %
STRESS BASELINE BP: NORMAL MMHG
STRESS BASELINE HR: 72 BPM
STRESS PERCENT HR: 56 %
STRESS POST PEAK BP: NORMAL MMHG
STRESS POST PEAK HR: 73 BPM
STRESS TARGET HR: 130 BPM
STRESS TARGET HR: 130 BPM

## 2021-08-23 PROCEDURE — 93306 TTE W/DOPPLER COMPLETE: CPT

## 2021-08-23 PROCEDURE — A9500 TC99M SESTAMIBI: HCPCS | Performed by: INTERNAL MEDICINE

## 2021-08-23 PROCEDURE — 78452 HT MUSCLE IMAGE SPECT MULT: CPT | Performed by: INTERNAL MEDICINE

## 2021-08-23 PROCEDURE — 93018 CV STRESS TEST I&R ONLY: CPT | Performed by: INTERNAL MEDICINE

## 2021-08-23 PROCEDURE — 78452 HT MUSCLE IMAGE SPECT MULT: CPT

## 2021-08-23 PROCEDURE — 93306 TTE W/DOPPLER COMPLETE: CPT | Performed by: INTERNAL MEDICINE

## 2021-08-23 PROCEDURE — 93017 CV STRESS TEST TRACING ONLY: CPT

## 2021-08-23 PROCEDURE — 25010000002 REGADENOSON 0.4 MG/5ML SOLUTION: Performed by: INTERNAL MEDICINE

## 2021-08-23 PROCEDURE — 0 TECHNETIUM SESTAMIBI: Performed by: INTERNAL MEDICINE

## 2021-08-23 PROCEDURE — 93880 EXTRACRANIAL BILAT STUDY: CPT

## 2021-08-23 PROCEDURE — 93880 EXTRACRANIAL BILAT STUDY: CPT | Performed by: RADIOLOGY

## 2021-08-23 RX ADMIN — TECHNETIUM TC 99M SESTAMIBI 1 DOSE: 1 INJECTION INTRAVENOUS at 13:08

## 2021-08-23 RX ADMIN — TECHNETIUM TC 99M SESTAMIBI 1 DOSE: 1 INJECTION INTRAVENOUS at 14:11

## 2021-08-23 RX ADMIN — REGADENOSON 0.4 MG: 0.08 INJECTION, SOLUTION INTRAVENOUS at 14:10

## 2021-10-18 RX ORDER — NITROGLYCERIN 0.4 MG/1
0.4 TABLET SUBLINGUAL
Qty: 25 TABLET | Refills: 3 | Status: SHIPPED | OUTPATIENT
Start: 2021-10-18 | End: 2022-03-21

## 2022-01-05 ENCOUNTER — OFFICE VISIT (OUTPATIENT)
Dept: CARDIOLOGY | Facility: CLINIC | Age: 67
End: 2022-01-05

## 2022-01-05 VITALS
HEIGHT: 70 IN | WEIGHT: 199.2 LBS | DIASTOLIC BLOOD PRESSURE: 68 MMHG | BODY MASS INDEX: 28.52 KG/M2 | SYSTOLIC BLOOD PRESSURE: 108 MMHG | TEMPERATURE: 97.5 F | HEART RATE: 73 BPM

## 2022-01-05 DIAGNOSIS — I50.30 CONGESTIVE HEART FAILURE WITH LV DIASTOLIC DYSFUNCTION, NYHA CLASS 2: ICD-10-CM

## 2022-01-05 DIAGNOSIS — E66.3 OVERWEIGHT: ICD-10-CM

## 2022-01-05 DIAGNOSIS — I25.9 IHD (ISCHEMIC HEART DISEASE): ICD-10-CM

## 2022-01-05 DIAGNOSIS — I10 ESSENTIAL HYPERTENSION: ICD-10-CM

## 2022-01-05 DIAGNOSIS — E78.2 MIXED HYPERLIPIDEMIA: ICD-10-CM

## 2022-01-05 DIAGNOSIS — E11.59 TYPE 2 DIABETES MELLITUS WITH OTHER CIRCULATORY COMPLICATION, WITH LONG-TERM CURRENT USE OF INSULIN: ICD-10-CM

## 2022-01-05 DIAGNOSIS — I20.9 ISCHEMIC CHEST PAIN: Primary | ICD-10-CM

## 2022-01-05 DIAGNOSIS — Z95.810 PRESENCE OF BIVENTRICULAR IMPLANTABLE CARDIOVERTER-DEFIBRILLATOR (ICD): Primary | ICD-10-CM

## 2022-01-05 DIAGNOSIS — Z79.4 TYPE 2 DIABETES MELLITUS WITH OTHER CIRCULATORY COMPLICATION, WITH LONG-TERM CURRENT USE OF INSULIN: ICD-10-CM

## 2022-01-05 PROCEDURE — 93284 PRGRMG EVAL IMPLANTABLE DFB: CPT | Performed by: INTERNAL MEDICINE

## 2022-01-05 PROCEDURE — 99214 OFFICE O/P EST MOD 30 MIN: CPT | Performed by: NURSE PRACTITIONER

## 2022-01-05 RX ORDER — PANTOPRAZOLE SODIUM 40 MG/1
40 TABLET, DELAYED RELEASE ORAL DAILY
Qty: 90 TABLET | Refills: 3 | Status: SHIPPED | OUTPATIENT
Start: 2022-01-05 | End: 2023-03-01 | Stop reason: SDUPTHER

## 2022-01-05 RX ORDER — DULAGLUTIDE 0.75 MG/.5ML
0.75 INJECTION, SOLUTION SUBCUTANEOUS
COMMUNITY
Start: 2021-12-23

## 2022-01-05 RX ORDER — FENOFIBRATE 145 MG/1
145 TABLET, COATED ORAL DAILY
Qty: 90 TABLET | Refills: 3 | Status: SHIPPED | OUTPATIENT
Start: 2022-01-05 | End: 2023-03-01 | Stop reason: SDUPTHER

## 2022-01-05 RX ORDER — ATORVASTATIN CALCIUM 80 MG/1
80 TABLET, FILM COATED ORAL DAILY
Qty: 90 TABLET | Refills: 3 | Status: SHIPPED | OUTPATIENT
Start: 2022-01-05 | End: 2023-02-13

## 2022-01-05 RX ORDER — EZETIMIBE 10 MG/1
10 TABLET ORAL DAILY
Qty: 90 TABLET | Refills: 3 | Status: SHIPPED | OUTPATIENT
Start: 2022-01-05 | End: 2023-03-01 | Stop reason: SDUPTHER

## 2022-01-05 RX ORDER — EZETIMIBE 10 MG/1
10 TABLET ORAL DAILY
COMMUNITY
End: 2022-01-05 | Stop reason: SDUPTHER

## 2022-01-05 RX ORDER — NEBIVOLOL 5 MG/1
5 TABLET ORAL DAILY
Qty: 90 TABLET | Refills: 3 | Status: SHIPPED | OUTPATIENT
Start: 2022-01-05 | End: 2023-03-01 | Stop reason: SDUPTHER

## 2022-01-05 NOTE — PROGRESS NOTES
Chief Complaint   Patient presents with   • Follow-up     Pt is here for cardiac follow up.  He denies CP, SOB, dizziness or palpitations.  He does take a daily ASA.   • Med Refill     Pt request 90 day refills from [facility].   • Lab Work     Pt states his last labs were with his PCP a few months.       Cardiac Complaints  none      Subjective   Adrian Samuel is a 66 y.o. male with HTN, hyperlipidemia, IDDM, and complex cardiac problems for which he has had PTCA, bypass, and numerous caths in the past. A biventricular ICD placed in 2012. In March 2015, cath showed a lesion in the LAD after the LIMA and he had stenting of the same. In September 2015, echo showed his EF 30-35% with moderate to severe MR. He was supposed to had started Entresto, but apparently did not. He had been taking just losartan. In 2015, he was referred back to  heart failure clinic. Entresto restarted. In March 2017, repeat Lexiscan stress showed lateral wall ischemia. The dose of nitrodur patch increased. Echocardiogram showed LV function remained low with EF 35-40%. Cath recommended if symptoms persisted. On 5/2/18, he returned for follow up visit and device interrogated. He reported more short of breath and chest pain, worse with exertion and relieved by 1 to 3 sl nitro. BiV/ICD interrogation reported 5/2/18 was normal. A cardiac cath was recommended. He was referred to Dr. Fuentes for radial approach. LIMA was patent. Proximal SVG to PDA had stenosis and 3.5x30 Resolute stent was placed. LVEF 35% noted. November 2018 device interrogation showed fluid overload. Aldactone added. 11/13/18, stress showed inferolateral infarct with nba-infarct ischemia. LVEF 34%. Nitro patch was advised and cardiac cath via radial approach scheduled. On 1/11/19, Cath showed LVEF 30-35%, patent grafts with significantly depressed systolic function. Empiric therapy for ischemia and maxim therapy for cardiomyopathy recommended.  In July 2020 he underwent  generator change out. He admitted to chest tightness at the last visit and stress and echo advised in July 2021. Stress showed lateral wall ischemia with cath advised if problems persisted. EF remained similar to prior at 34%. Carotid US advised for dizziness negative for flow limiting stenosis.    He returns today for follow up and denies any new concerns. He does report doing better since pacer adjustment 6 months ago. No CP, SOA, dizziness, or palpitations noted. Labs he admits were done at PCP a few months ago, no copy available for review. He admits glucose was high, he reports adjustment to DM meds were advised. He does continue with DAPT and has done well. Most recent pacer check 01/05/2021 showed 2.2% PVC, 89% atrial pacing, and 5.5 years of battery life remaining.       Cardiac History  Past Surgical History:   Procedure Laterality Date   • CARDIAC CATHETERIZATION  1993    Cath-PTCA.   • CARDIAC CATHETERIZATION  02/08/2005    Cath-() Patent Grafts   • CARDIAC CATHETERIZATION  08/01/2005    Cath-(Dr. Sigala) Patent Grafts.   • CARDIAC CATHETERIZATION  09/18/2007    Cath-100%LAD, 80%D2. 100%LCX, RCA. Patent Lima & SVG to PDA.   • CARDIAC CATHETERIZATION  08/22/2006    Cath-Patent Grafts   • CARDIAC CATHETERIZATION  01/15/2010    Cath-Patent LIMA & SVG to PDA. 80% OM1-2.25 x 16mm Taxus Stent   • CARDIAC CATHETERIZATION  02/14/2012    Cath-IVUS LAD 60-70%, 2.55 x 20mm Promus stent Ramus   • CARDIAC CATHETERIZATION  03/12/2013    Cath-3.5 x 28Promus stent SVG to RCA.   • CARDIAC CATHETERIZATION  11/25/2013    Cath-(Dr. Sigala) EF30-35%, occluded circ and graft but increase collaterol flow manage medically.   • CARDIAC CATHETERIZATION  12/22/2014    Cath-85% SVG to PDA-   • CARDIAC CATHETERIZATION  03/18/2015    Cath-90%LIMA to LAD-80% SVG to PDA-2.75 x 8 Resolute   • CARDIAC CATHETERIZATION  05/29/2018    80% SVG to PDA- 3.5x30 Resolute Stent.   • CARDIAC DEFIBRILLATOR PLACEMENT  2012    BiV/ICD, MENG    • CARDIOVASCULAR STRESS TEST  02/19/2008    D.Myoview-75%THR. EF 43%. InferoLateral Infarct with Nba-Infarct Ischemia   • CARDIOVASCULAR STRESS TEST  05/08/2009    D.Myoview-78%THR. EF48%. Inferiolateral wall infarct and nba-infarct ischemia.   • CARDIOVASCULAR STRESS TEST  03/03/2010    L.Myoview-Lateral Ischemia   • CARDIOVASCULAR STRESS TEST  02/13/2013    L.Myoview-lateral wall ischemia   • CARDIOVASCULAR STRESS TEST  11/11/2013    L.Myoview-lateral wall ischemia   • CARDIOVASCULAR STRESS TEST  11/18/2014    L.Myoview-([facility]) EF. Inferolateral Infarct   • CARDIOVASCULAR STRESS TEST  03/17/2015    L.Myoview-Sig Anterior Changes   • CARDIOVASCULAR STRESS TEST  03/15/2017    Lexiscan- lateral wall ischemia, nitrodur increased, cath if symptoms persist   • CARDIOVASCULAR STRESS TEST  11/13/2018    L. Cardiolite- EF 34%. Inferolateral Infarct with periinfarct Ischemia.   • CARDIOVASCULAR STRESS TEST  08/23/2021    Lexiscan- EF 34%. Lateral Ischemia   • CONVERTED (HISTORICAL) CARDIOVASCULAR STUDIES  09/05/2012    MUGA-RVEF 41%, LVEF 38%   • CONVERTED (HISTORICAL) HOLTER  06/11/2006    Holter-PVC's noted   • CORONARY ARTERY BYPASS GRAFT  1995    CABG LIMA TO LAD, SVG TO OM and SVG to PDA.   • ECHO - CONVERTED  08/29/2007    Echo-EF50-55%, mild to mod. MR, mildly hypokinetic septum.   • ECHO - CONVERTED  05/08/2009    Echo-EF45-49%, Moderate MR and PA pressure-42 mmHg.   • ECHO - CONVERTED  01/18/2012    Echo-EF 25-30%   • ECHO - CONVERTED  05/17/2012    Echo-EF<30%. AnteroSpectal WMA.   • ECHO - CONVERTED  09/02/2015    Echo-EF 30-35%, mod to severe MR   • ECHO - CONVERTED  03/15/2017    EF 35-40%, RVSP 25-30 mmHg   • ECHO - CONVERTED  11/13/2018    EF 35%. Inferolateral WMA. Mild- Mod MR. RVSP- 30 mmHg   • ECHO - CONVERTED  08/23/2021    EF 35%. Lateral WMA. LA- 4.7 Cm. Trace MR & AI. RVSP- 28 mmHg   • OTHER SURGICAL HISTORY  02/28/2014    U/S Aorta-   • US CAROTID UNILATERAL  05/21/2015    Carotid US-no  hemodyanamically significant stenosis   • US CAROTID UNILATERAL  04/10/1989    Carotid US-No sig. stenosis noted       Current Outpatient Medications   Medication Sig Dispense Refill   • aspirin 81 MG EC tablet Take 81 mg by mouth daily.     • atorvastatin (LIPITOR) 80 MG tablet Take 1 tablet by mouth Daily. 90 tablet 3   • baclofen (LIORESAL) 10 MG tablet Take 10 mg by mouth Daily As Needed for Muscle Spasms.     • carbidopa-levodopa (SINEMET)  MG per tablet Take 2 tablets by mouth 2 (Two) Times a Day.     • cetirizine (ZyrTEC) 10 MG tablet Take 10 mg by mouth daily.     • Cholecalciferol 50 MCG (2000 UT) tablet Take 2,000 Units by mouth Daily.     • citalopram (CeleXA) 40 MG tablet Take 40 mg by mouth daily.     • empagliflozin (Jardiance) 10 MG tablet tablet Take 10 mg by mouth Daily.     • ezetimibe (Zetia) 10 MG tablet Take 1 tablet by mouth Daily. 90 tablet 3   • fenofibrate (TRICOR) 145 MG tablet Take 1 tablet by mouth Daily. 90 tablet 3   • HYDROcodone-acetaminophen (NORCO)  MG per tablet Take 1 tablet by mouth Every 6 (Six) Hours As Needed for Moderate Pain .     • Insulin Aspart Prot & Aspart (NOVOLOG MIX 70/30 PENFILL SC) Inject  under the skin.     • nebivolol (BYSTOLIC) 5 MG tablet Take 1 tablet by mouth Daily. 90 tablet 3   • nitroglycerin (NITROSTAT) 0.4 MG SL tablet PLACE 1 TABLET UNDER THE TONGUE EVERY 5 (FIVE) MINUTES AS NEEDED FOR CHEST PAIN. TAKE NO MORE THAN 3 DOSES IN 15 MINUTES. 25 tablet 3   • O2 (OXYGEN) Inhale 2 L/min at night as needed.     • ondansetron (ZOFRAN) 8 MG tablet Take 8 mg by mouth 3 (Three) Times a Day As Needed for Nausea or Vomiting.     • ONE TOUCH ULTRA TEST test strip   0   • ONETOUCH DELICA LANCETS 33G misc   0   • pantoprazole (PROTONIX) 40 MG EC tablet Take 1 tablet by mouth Daily. 90 tablet 3   • polyethylene glycol (MIRALAX) packet Take 17 g by mouth daily as needed.     • potassium chloride (K-DUR) 10 MEQ CR tablet Take 10 mEq by mouth As Needed.  2   •  "pregabalin (LYRICA) 100 MG capsule Take 75 mg by mouth Every Night.     • sacubitril-valsartan (ENTRESTO) 24-26 MG tablet Take 1 tablet by mouth 2 (Two) Times a Day. 180 tablet 3   • SITagliptin (JANUVIA) 100 MG tablet Take 100 mg by mouth Daily.     • SURE COMFORT INSULIN SYRINGE 31G X 5/16\" 1 ML misc   6   • ticagrelor (Brilinta) 90 MG tablet tablet Take 1 tablet by mouth 2 (Two) Times a Day. 180 tablet 3   • Trulicity 0.75 MG/0.5ML solution pen-injector Inject 0.75 mg under the skin into the appropriate area as directed Every 7 (Seven) Days.       No current facility-administered medications for this visit.       Effient [prasugrel], Imdur [isosorbide dinitrate], Livalo [pitavastatin], and Ranolazine    Past Medical History:   Diagnosis Date   • Disease of thyroid gland 04/10/2009    small colloid cyst right thyroid   • Esophageal dilatation     Followed by Dr. Kyle   • Heel spurs removed    • Hypercholesterolemia    • Hypertension     Systemic   • IHD (ischemic heart disease)    • Other abnormal cytological findings on specimens from anus  &     [facility]   • Pain     Chronic       Social History     Socioeconomic History   • Marital status:    Tobacco Use   • Smoking status: Former Smoker     Quit date:      Years since quittin.0   • Smokeless tobacco: Current User     Types: Chew   • Tobacco comment: 2015   Vaping Use   • Vaping Use: Never used   Substance and Sexual Activity   • Alcohol use: No   • Drug use: Defer   • Sexual activity: Defer       Family History   Problem Relation Age of Onset   • Heart disease Mother    • Stroke Mother    • Diabetes Mother    • Diabetes Father    • Cirrhosis Father    • Heart disease Other    • Diabetes Other        Review of Systems   Constitutional: Negative for malaise/fatigue and night sweats.   Cardiovascular: Negative for chest pain, claudication, dyspnea on exertion, irregular heartbeat, leg swelling, near-syncope, orthopnea, palpitations and " "syncope.   Respiratory: Negative for cough, shortness of breath and wheezing.    Musculoskeletal: Positive for stiffness. Negative for back pain and joint pain.   Gastrointestinal: Negative for anorexia, heartburn, melena and nausea.   Genitourinary: Negative for dysuria, hematuria, hesitancy and nocturia.   Neurological: Negative for dizziness, light-headedness and loss of balance.   Psychiatric/Behavioral: Negative for depression and memory loss. The patient is not nervous/anxious.            Objective     /68 (BP Location: Left arm, Patient Position: Sitting)   Pulse 73   Temp 97.5 °F (36.4 °C)   Ht 177.8 cm (70\")   Wt 90.4 kg (199 lb 3.2 oz)   BMI 28.58 kg/m²     Constitutional:       Appearance: Frail.   Eyes:      Pupils: Pupils are equal, round, and reactive to light.   HENT:      Nose: Nose normal.   Pulmonary:      Effort: Pulmonary effort is normal.      Breath sounds: Normal breath sounds.   Cardiovascular:      PMI at left midclavicular line. Normal rate. Regular rhythm.      Murmurs: There is a systolic murmur.   Abdominal:      Palpations: Abdomen is soft.   Musculoskeletal: Normal range of motion.      Cervical back: Normal range of motion and neck supple. Skin:     General: Skin is warm and dry.   Neurological:      Mental Status: Oriented to person, place and time.         Procedures    Assessment/Plan     Cardiomyopathy/systolic class II failure: EF remains around 34%, entresto therapy to be continued, as well as bystolic. Most recent BI-V pacer showed normal function, no shocks noted. Repeat check with next visit.    IHD:  No new concerns voiced. Most recent stress showed small lateral wall ischemia and LV function of 34% noted. He has remained on DAPT therapy without concerns. No bleed or bruise noted. No chest pain noted. No cath advised at present.     HTN:  Blood pressure stable but low normal. No increase in entresto therapy advised as status stable and BP already low normal. " Bystolic continued at the same. Limited sodium advised.     Hyperlipidemia:  On zetia and tricor. Tolerating well. FLP with your office. Can we have for review?  Same continued as tolerance reported.     DM:  Insulin dependent. He does report glucose recently elevated and meds recently changed. Limited sugar/carbs/starch advised.     BMI noted at 28.58, good cardiac diet with limited carbs, calories, and activity as tolerated advised.     Refills per request.     6 month follow up advised or sooner if needed.       Problems Addressed this Visit        Cardiac and Vasculature    Congestive heart failure with LV diastolic dysfunction, NYHA class 2 (HCC)    Relevant Medications    sacubitril-valsartan (ENTRESTO) 24-26 MG tablet    ticagrelor (Brilinta) 90 MG tablet tablet    nebivolol (BYSTOLIC) 5 MG tablet    IHD (ischemic heart disease)    Relevant Medications    sacubitril-valsartan (ENTRESTO) 24-26 MG tablet    ticagrelor (Brilinta) 90 MG tablet tablet    nebivolol (BYSTOLIC) 5 MG tablet    Presence of biventricular implantable cardioverter-defibrillator (ICD) - Primary       Endocrine and Metabolic    Type 2 diabetes mellitus with circulatory disorder, with long-term current use of insulin (HCC)    Relevant Medications    Trulicity 0.75 MG/0.5ML solution pen-injector    empagliflozin (Jardiance) 10 MG tablet tablet      Other Visit Diagnoses     Essential hypertension        Relevant Medications    nebivolol (BYSTOLIC) 5 MG tablet    Mixed hyperlipidemia        Relevant Medications    atorvastatin (LIPITOR) 80 MG tablet    ezetimibe (Zetia) 10 MG tablet    fenofibrate (TRICOR) 145 MG tablet    Overweight          Diagnoses       Codes Comments    Presence of biventricular implantable cardioverter-defibrillator (ICD)    -  Primary ICD-10-CM: Z95.810  ICD-9-CM: V45.02     Congestive heart failure with LV diastolic dysfunction, NYHA class 2 (HCC)     ICD-10-CM: I50.30  ICD-9-CM: 428.30, 428.0     IHD (ischemic heart  disease)     ICD-10-CM: I25.9  ICD-9-CM: 414.9     Essential hypertension     ICD-10-CM: I10  ICD-9-CM: 401.9     Mixed hyperlipidemia     ICD-10-CM: E78.2  ICD-9-CM: 272.2     Type 2 diabetes mellitus with other circulatory complication, with long-term current use of insulin (HCC)     ICD-10-CM: E11.59, Z79.4  ICD-9-CM: 250.70, V58.67     Overweight     ICD-10-CM: E66.3  ICD-9-CM: 278.02           Patient's Body mass index is 28.58 kg/m². indicating that he is overweight. Good cardiac diet with limited carbs, calories, and activity as tolerated advised.           Electronically signed by JONH Quispe January 5, 2022 18:15 EST

## 2022-02-28 DIAGNOSIS — I25.9 IHD (ISCHEMIC HEART DISEASE): ICD-10-CM

## 2022-03-01 RX ORDER — TICAGRELOR 90 MG/1
TABLET ORAL
Qty: 180 TABLET | Refills: 3 | Status: SHIPPED | OUTPATIENT
Start: 2022-03-01 | End: 2023-03-29

## 2022-03-21 RX ORDER — NITROGLYCERIN 0.4 MG/1
0.4 TABLET SUBLINGUAL
Qty: 25 TABLET | Refills: 3 | Status: SHIPPED | OUTPATIENT
Start: 2022-03-21 | End: 2023-02-14 | Stop reason: SDUPTHER

## 2022-08-03 ENCOUNTER — OFFICE VISIT (OUTPATIENT)
Dept: CARDIOLOGY | Facility: CLINIC | Age: 67
End: 2022-08-03

## 2022-08-03 VITALS
WEIGHT: 201.6 LBS | DIASTOLIC BLOOD PRESSURE: 60 MMHG | HEIGHT: 70 IN | BODY MASS INDEX: 28.86 KG/M2 | HEART RATE: 68 BPM | SYSTOLIC BLOOD PRESSURE: 120 MMHG

## 2022-08-03 DIAGNOSIS — I25.9 IHD (ISCHEMIC HEART DISEASE): ICD-10-CM

## 2022-08-03 DIAGNOSIS — Z79.4 TYPE 2 DIABETES MELLITUS WITH OTHER CIRCULATORY COMPLICATION, WITH LONG-TERM CURRENT USE OF INSULIN: ICD-10-CM

## 2022-08-03 DIAGNOSIS — E11.59 TYPE 2 DIABETES MELLITUS WITH OTHER CIRCULATORY COMPLICATION, WITH LONG-TERM CURRENT USE OF INSULIN: ICD-10-CM

## 2022-08-03 DIAGNOSIS — E78.00 HYPERCHOLESTEREMIA: ICD-10-CM

## 2022-08-03 DIAGNOSIS — I25.5 ISCHEMIC CARDIOMYOPATHY: ICD-10-CM

## 2022-08-03 DIAGNOSIS — I25.9 IHD (ISCHEMIC HEART DISEASE): Primary | ICD-10-CM

## 2022-08-03 DIAGNOSIS — Z95.810 PRESENCE OF BIVENTRICULAR IMPLANTABLE CARDIOVERTER-DEFIBRILLATOR (ICD): ICD-10-CM

## 2022-08-03 DIAGNOSIS — I50.30 CONGESTIVE HEART FAILURE WITH LV DIASTOLIC DYSFUNCTION, NYHA CLASS 2: Primary | ICD-10-CM

## 2022-08-03 PROCEDURE — 93284 PRGRMG EVAL IMPLANTABLE DFB: CPT | Performed by: INTERNAL MEDICINE

## 2022-08-03 PROCEDURE — 99214 OFFICE O/P EST MOD 30 MIN: CPT | Performed by: NURSE PRACTITIONER

## 2022-08-03 NOTE — PROGRESS NOTES
Chief Complaint   Patient presents with   • Follow-up     Cardiac management . Has no cardiac complaints today.   • Pacemaker Check     St Cam device check today   • LABS     Had labs within last 6 months per PCP. Patient said  glucose was elevated .   • Med Refill     No refills needed today. Had med list today       Subjective       Adrian Samuel is a 67 y.o. male seen in the office today for follow-up visit and reports the following:    The patient reports that he has been able to keep up doing all his normal activities. He denies any new chest pain. He states that his symptoms are the same as at his last visit. He does not want a heart catheterization at this time. The patient reports that when he gets exhausted, the palpitations return a little. He states that he is not having to use nitroglycerin very often. He does not need any refills on his medications.The patient states that his blood sugar is not too good. He watches what he eats.      Cardiac History:  Past Surgical History:   Procedure Laterality Date   • CARDIAC CATHETERIZATION  1993    Cath-PTCA.   • CARDIAC CATHETERIZATION  02/08/2005    Cath-() Patent Grafts   • CARDIAC CATHETERIZATION  08/01/2005    Cath-(Dr. Sigala) Patent Grafts.   • CARDIAC CATHETERIZATION  09/18/2007    Cath-100%LAD, 80%D2. 100%LCX, RCA. Patent Lima & SVG to PDA.   • CARDIAC CATHETERIZATION  08/22/2006    Cath-Patent Grafts   • CARDIAC CATHETERIZATION  01/15/2010    Cath-Patent LIMA & SVG to PDA. 80% OM1-2.25 x 16mm Taxus Stent   • CARDIAC CATHETERIZATION  02/14/2012    Cath-IVUS LAD 60-70%, 2.55 x 20mm Promus stent Ramus   • CARDIAC CATHETERIZATION  03/12/2013    Cath-3.5 x 28Promus stent SVG to RCA.   • CARDIAC CATHETERIZATION  11/25/2013    Cath-(Dr. Sigala) EF30-35%, occluded circ and graft but increase collaterol flow manage medically.   • CARDIAC CATHETERIZATION  12/22/2014    Cath-85% SVG to PDA-   • CARDIAC CATHETERIZATION  03/18/2015    Cath-90%LIMA to  LAD-80% SVG to PDA-2.75 x 8 Resolute   • CARDIAC CATHETERIZATION  05/29/2018    80% SVG to PDA- 3.5x30 Resolute Stent.   • CARDIAC DEFIBRILLATOR PLACEMENT  2012    BiV/ICD, YAMILETM   • CARDIOVASCULAR STRESS TEST  02/19/2008    D.Myoview-75%THR. EF 43%. InferoLateral Infarct with Nba-Infarct Ischemia   • CARDIOVASCULAR STRESS TEST  05/08/2009    D.Myoview-78%THR. EF48%. Inferiolateral wall infarct and nba-infarct ischemia.   • CARDIOVASCULAR STRESS TEST  03/03/2010    L.Myoview-Lateral Ischemia   • CARDIOVASCULAR STRESS TEST  02/13/2013    L.Myoview-lateral wall ischemia   • CARDIOVASCULAR STRESS TEST  11/11/2013    L.Myoview-lateral wall ischemia   • CARDIOVASCULAR STRESS TEST  11/18/2014    L.Myoview-([facility]) EF. Inferolateral Infarct   • CARDIOVASCULAR STRESS TEST  03/17/2015    L.Myoview-Sig Anterior Changes   • CARDIOVASCULAR STRESS TEST  03/15/2017    Lexiscan- lateral wall ischemia, nitrodur increased, cath if symptoms persist   • CARDIOVASCULAR STRESS TEST  11/13/2018    L. Cardiolite- EF 34%. Inferolateral Infarct with periinfarct Ischemia.   • CARDIOVASCULAR STRESS TEST  08/23/2021    Lexiscan- EF 34%. Lateral Ischemia   • CONVERTED (HISTORICAL) CARDIOVASCULAR STUDIES  09/05/2012    MUGA-RVEF 41%, LVEF 38%   • CONVERTED (HISTORICAL) HOLTER  06/11/2006    Holter-PVC's noted   • CORONARY ARTERY BYPASS GRAFT  1995    CABG LIMA TO LAD, SVG TO OM and SVG to PDA.   • ECHO - CONVERTED  08/29/2007    Echo-EF50-55%, mild to mod. MR, mildly hypokinetic septum.   • ECHO - CONVERTED  05/08/2009    Echo-EF45-49%, Moderate MR and PA pressure-42 mmHg.   • ECHO - CONVERTED  01/18/2012    Echo-EF 25-30%   • ECHO - CONVERTED  05/17/2012    Echo-EF<30%. AnteroSpectal WMA.   • ECHO - CONVERTED  09/02/2015    Echo-EF 30-35%, mod to severe MR   • ECHO - CONVERTED  03/15/2017    EF 35-40%, RVSP 25-30 mmHg   • ECHO - CONVERTED  11/13/2018    EF 35%. Inferolateral WMA. Mild- Mod MR. RVSP- 30 mmHg   • ECHO - CONVERTED  08/23/2021    EF  35%. Lateral WMA. LA- 4.7 Cm. Trace MR & AI. RVSP- 28 mmHg   • OTHER SURGICAL HISTORY  02/28/2014    U/S Aorta-   • US CAROTID UNILATERAL  05/21/2015    Carotid US-no hemodyanamically significant stenosis   • US CAROTID UNILATERAL  04/10/1989    Carotid US-No sig. stenosis noted       Current Outpatient Medications   Medication Sig Dispense Refill   • aspirin 81 MG EC tablet Take 81 mg by mouth daily.     • atorvastatin (LIPITOR) 80 MG tablet Take 1 tablet by mouth Daily. 90 tablet 3   • baclofen (LIORESAL) 10 MG tablet Take 10 mg by mouth Daily As Needed for Muscle Spasms.     • Brilinta 90 MG tablet tablet TAKE 1 TABLET BY MOUTH TWICE DAILY 180 tablet 3   • carbidopa-levodopa (SINEMET)  MG per tablet Take 2 tablets by mouth 2 (Two) Times a Day.     • cetirizine (ZyrTEC) 10 MG tablet Take 10 mg by mouth daily.     • Cholecalciferol 50 MCG (2000 UT) tablet Take 2,000 Units by mouth Daily.     • citalopram (CeleXA) 40 MG tablet Take 40 mg by mouth daily.     • empagliflozin (JARDIANCE) 10 MG tablet tablet Take 10 mg by mouth Daily.     • ezetimibe (Zetia) 10 MG tablet Take 1 tablet by mouth Daily. 90 tablet 3   • fenofibrate (TRICOR) 145 MG tablet Take 1 tablet by mouth Daily. 90 tablet 3   • HYDROcodone-acetaminophen (NORCO)  MG per tablet Take 1 tablet by mouth Every 6 (Six) Hours As Needed for Moderate Pain .     • Insulin Aspart Prot & Aspart (NOVOLOG MIX 70/30 PENFILL SC) Inject  under the skin.     • nebivolol (BYSTOLIC) 5 MG tablet Take 1 tablet by mouth Daily. 90 tablet 3   • nitroglycerin (NITROSTAT) 0.4 MG SL tablet PLACE 1 TABLET UNDER THE TONGUE EVERY 5 (FIVE) MINUTES AS NEEDED FOR CHEST PAIN. TAKE NO MORE THAN 3 DOSES IN 15 MINUTES. 25 tablet 3   • O2 (OXYGEN) Inhale 2 L/min at night as needed.     • ondansetron (ZOFRAN) 8 MG tablet Take 8 mg by mouth 3 (Three) Times a Day As Needed for Nausea or Vomiting.     • ONE TOUCH ULTRA TEST test strip   0   • ONETOUCH DELICA LANCETS 33G misc   0   •  "pantoprazole (PROTONIX) 40 MG EC tablet Take 1 tablet by mouth Daily. 90 tablet 3   • polyethylene glycol (MIRALAX) packet Take 17 g by mouth daily as needed.     • potassium chloride (K-DUR) 10 MEQ CR tablet Take 10 mEq by mouth As Needed.  2   • pregabalin (LYRICA) 100 MG capsule Take 75 mg by mouth Every Night.     • sacubitril-valsartan (ENTRESTO) 24-26 MG tablet Take 1 tablet by mouth 2 (Two) Times a Day. 180 tablet 3   • SITagliptin (JANUVIA) 100 MG tablet Take 100 mg by mouth Daily.     • SURE COMFORT INSULIN SYRINGE 31G X /16\" 1 ML misc   6   • Trulicity 0.75 MG/0.5ML solution pen-injector Inject 0.75 mg under the skin into the appropriate area as directed Every 7 (Seven) Days.       No current facility-administered medications for this visit.       Effient [prasugrel], Imdur [isosorbide dinitrate], Livalo [pitavastatin], and Ranolazine    Past Medical History:   Diagnosis Date   • Disease of thyroid gland 04/10/2009    small colloid cyst right thyroid   • Esophageal dilatation     Followed by Dr. Kyle   • Heel spurs removed    • Hypercholesterolemia    • Hypertension     Systemic   • IHD (ischemic heart disease)    • Other abnormal cytological findings on specimens from anus  &     [facility]   • Pain     Chronic       Social History     Socioeconomic History   • Marital status:    Tobacco Use   • Smoking status: Former Smoker     Quit date:      Years since quittin.6   • Smokeless tobacco: Current User     Types: Chew   • Tobacco comment: 2015   Vaping Use   • Vaping Use: Never used   Substance and Sexual Activity   • Alcohol use: No   • Drug use: Defer   • Sexual activity: Defer       Family History   Problem Relation Age of Onset   • Heart disease Mother    • Stroke Mother    • Diabetes Mother    • Diabetes Father    • Cirrhosis Father    • Heart disease Other    • Diabetes Other        Review of Systems   Constitutional: Positive for malaise/fatigue (same). Negative for " "decreased appetite and diaphoresis.   HENT: Negative for nosebleeds.    Eyes: Negative for blurred vision.   Cardiovascular: Positive for chest pain (stable, no worse). Negative for claudication, cyanosis, dyspnea on exertion, irregular heartbeat, leg swelling, near-syncope, orthopnea, palpitations, paroxysmal nocturnal dyspnea and syncope.   Respiratory: Positive for shortness of breath. Negative for sleep disturbances due to breathing.    Endocrine: Negative for cold intolerance and heat intolerance.   Hematologic/Lymphatic: Does not bruise/bleed easily.   Skin: Negative for rash.   Musculoskeletal: Negative for myalgias.   Gastrointestinal: Negative for heartburn, melena and nausea.   Genitourinary: Negative for dysuria and hematuria.   Neurological: Negative for dizziness and light-headedness.   Psychiatric/Behavioral: Negative for memory loss. The patient does not have insomnia and is not nervous/anxious.         BP Readings from Last 5 Encounters:   08/03/22 120/60   01/05/22 108/68   07/21/21 130/60   12/16/20 122/66   06/17/20 120/70       Wt Readings from Last 5 Encounters:   08/03/22 91.4 kg (201 lb 9.6 oz)   01/05/22 90.4 kg (199 lb 3.2 oz)   07/21/21 87.5 kg (193 lb)   12/16/20 91.4 kg (201 lb 9.6 oz)   06/17/20 96 kg (211 lb 9.6 oz)       Objective     /60 (BP Location: Left arm)   Pulse 68   Ht 177.8 cm (70\")   Wt 91.4 kg (201 lb 9.6 oz)   BMI 28.93 kg/m²     Vitals and nursing note reviewed.   Eyes:      Pupils: Pupils are equal, round, and reactive to light.   HENT:      Head: Normocephalic.   Pulmonary:      Breath sounds: Normal breath sounds.   Cardiovascular:      Normal rate. Regular rhythm.   Pulses:     Intact distal pulses.   Edema:     Peripheral edema absent.   Abdominal:      General: Bowel sounds are normal.      Palpations: Abdomen is soft.   Musculoskeletal: Normal range of motion.      Cervical back: Normal range of motion. Skin:     General: Skin is warm.   Neurological:     "  Mental Status: Alert and oriented to person, place, and time.          Procedures: none today          Assessment & Plan   Diagnoses and all orders for this visit:    1. IHD (ischemic heart disease) (Primary)    2. Ischemic cardiomyopathy    3. Presence of biventricular implantable cardioverter-defibrillator (ICD)    4. Hypercholesteremia    5. Type 2 diabetes mellitus with other circulatory complication, with long-term current use of insulin (HCC)      IHD  - patient admits to symptoms of stable angina. He declines cardiac cath.   -continue prn nitrostat. If symptoms worsen I advised him to call and repeat stress or cath will be advised.   -Continue statin and aspirin    Cardiomyopathy  - echo 2021 EF 35%.  -clinically stable. Continue beta blocker, Entresto  -at next visit consider repeat echo.  -St Cam biventricular ICD interrogation today shows 1 episode of ATP.  No atrial fib noted.  Battery life greater than 4.7 years noted.    Hypertension  - BP stable continue current antihypertensive agent    Hypercholesterolemia  -labs per PCP. Please forward copy?    Diabetes  - Patient admits fluctuation in glucose.  Followed by endocrinologist.    A 6-month follow-up visit scheduled.  Device interrogation requested same day.  Please call sooner for cardiac concerns.            Transcribed from ambient dictation for JONH Nolan by MOON JOSHI.  08/03/22   13:52 EDT    Patient verbalized consent to the visit recording.

## 2023-01-31 ENCOUNTER — TELEPHONE (OUTPATIENT)
Dept: CARDIOLOGY | Facility: CLINIC | Age: 68
End: 2023-01-31

## 2023-01-31 NOTE — TELEPHONE ENCOUNTER
Caller: Jocelyn Barrow    Relationship to patient: Emergency Contact    Best call back number: [phone]    Type of visit: FU W/ DEVICE CHECK    Requested date: DOESN'T MATTER AS LONG AS ITS THE SAME DAY     Additional notes: PT WANTS APPT WITH PRISCILA AND HIS PACEMAKER CHECK ON THE SAME DAY - ONLY WANTS TO SEE PRISCILA

## 2023-02-02 NOTE — TELEPHONE ENCOUNTER
I SPOKE WITH THIS PT'S FAMILY MEMBER IN REGARDS TO THIS. SHE WANTED ME TO SCHEDULE A 6 MONTH FOLLOW UP IN September TO ENSURE THAT IN THE FUTURE THE APPOINTMENTS WILL BE ON THE SAME DAY. THEY DID WANT TO KEEP THE APPTS IN MARCH AS WELL AS THEY DID NOT BELIEVE THAT PT SHOULD WAIT UNTIL AUGUST TO BE SEEN. THE PT DOES ALSO PREFER TO ONLY BE SEEN BY [facility].

## 2023-02-10 DIAGNOSIS — E78.2 MIXED HYPERLIPIDEMIA: ICD-10-CM

## 2023-02-13 RX ORDER — ATORVASTATIN CALCIUM 80 MG/1
80 TABLET, FILM COATED ORAL DAILY
Qty: 90 TABLET | Refills: 3 | Status: SHIPPED | OUTPATIENT
Start: 2023-02-13

## 2023-02-13 NOTE — TELEPHONE ENCOUNTER
Per PCP office, pt had cmp, cbc and lipid done in 11/2021. Faxing results. Contacted pt to see if he has had any labs done anywhere else. Pt states he has not had any other lab work. He is willing to get labs if needed. Requested lab order to be faxed to [facility] and/or PCP office. He is not sure which place he will go to get the labs done.

## 2023-02-15 ENCOUNTER — TELEPHONE (OUTPATIENT)
Dept: CARDIOLOGY | Facility: CLINIC | Age: 68
End: 2023-02-15
Payer: MEDICARE

## 2023-02-15 RX ORDER — NITROGLYCERIN 0.4 MG/1
0.4 TABLET SUBLINGUAL
Qty: 25 TABLET | Refills: 3 | Status: SHIPPED | OUTPATIENT
Start: 2023-02-15 | End: 2023-03-29 | Stop reason: SDUPTHER

## 2023-02-15 NOTE — TELEPHONE ENCOUNTER
Received fax from Dr. Carranza for cardiac clearance for patient to have a left total hip arthroplasty. Patient is on Brilinta and they are requesting to hold. According to our records, patient's last stenting was done on 05/29/18.        Fax [phone]

## 2023-03-01 ENCOUNTER — OFFICE VISIT (OUTPATIENT)
Dept: CARDIOLOGY | Facility: CLINIC | Age: 68
End: 2023-03-01
Payer: MEDICARE

## 2023-03-01 DIAGNOSIS — I25.5 ISCHEMIC CARDIOMYOPATHY: Primary | ICD-10-CM

## 2023-03-01 DIAGNOSIS — E78.2 MIXED HYPERLIPIDEMIA: ICD-10-CM

## 2023-03-01 DIAGNOSIS — I50.30 CONGESTIVE HEART FAILURE WITH LV DIASTOLIC DYSFUNCTION, NYHA CLASS 2: ICD-10-CM

## 2023-03-01 DIAGNOSIS — I10 ESSENTIAL HYPERTENSION: ICD-10-CM

## 2023-03-01 PROCEDURE — 93289 INTERROG DEVICE EVAL HEART: CPT | Performed by: INTERNAL MEDICINE

## 2023-03-01 RX ORDER — NEBIVOLOL 5 MG/1
5 TABLET ORAL DAILY
Qty: 90 TABLET | Refills: 3 | Status: SHIPPED | OUTPATIENT
Start: 2023-03-01

## 2023-03-01 RX ORDER — PANTOPRAZOLE SODIUM 40 MG/1
40 TABLET, DELAYED RELEASE ORAL DAILY
Qty: 90 TABLET | Refills: 3 | Status: SHIPPED | OUTPATIENT
Start: 2023-03-01

## 2023-03-01 RX ORDER — EZETIMIBE 10 MG/1
10 TABLET ORAL DAILY
Qty: 90 TABLET | Refills: 3 | Status: SHIPPED | OUTPATIENT
Start: 2023-03-01

## 2023-03-01 RX ORDER — FENOFIBRATE 145 MG/1
145 TABLET, COATED ORAL DAILY
Qty: 90 TABLET | Refills: 3 | Status: SHIPPED | OUTPATIENT
Start: 2023-03-01 | End: 2023-03-29 | Stop reason: ALTCHOICE

## 2023-03-29 ENCOUNTER — OFFICE VISIT (OUTPATIENT)
Dept: CARDIOLOGY | Facility: CLINIC | Age: 68
End: 2023-03-29
Payer: MEDICARE

## 2023-03-29 VITALS
DIASTOLIC BLOOD PRESSURE: 56 MMHG | SYSTOLIC BLOOD PRESSURE: 110 MMHG | HEIGHT: 70 IN | HEART RATE: 98 BPM | WEIGHT: 195 LBS | BODY MASS INDEX: 27.92 KG/M2

## 2023-03-29 DIAGNOSIS — Z95.810 PRESENCE OF BIVENTRICULAR IMPLANTABLE CARDIOVERTER-DEFIBRILLATOR (ICD): ICD-10-CM

## 2023-03-29 DIAGNOSIS — I50.30 CONGESTIVE HEART FAILURE WITH LV DIASTOLIC DYSFUNCTION, NYHA CLASS 2: ICD-10-CM

## 2023-03-29 DIAGNOSIS — Z79.4 TYPE 2 DIABETES MELLITUS WITH OTHER CIRCULATORY COMPLICATION, WITH LONG-TERM CURRENT USE OF INSULIN: ICD-10-CM

## 2023-03-29 DIAGNOSIS — I25.9 IHD (ISCHEMIC HEART DISEASE): Primary | ICD-10-CM

## 2023-03-29 DIAGNOSIS — E78.00 HYPERCHOLESTEREMIA: ICD-10-CM

## 2023-03-29 DIAGNOSIS — E11.59 TYPE 2 DIABETES MELLITUS WITH OTHER CIRCULATORY COMPLICATION, WITH LONG-TERM CURRENT USE OF INSULIN: ICD-10-CM

## 2023-03-29 DIAGNOSIS — Z78.9 PACED RHYTHM ON ELECTROCARDIOGRAM (ECG): ICD-10-CM

## 2023-03-29 DIAGNOSIS — I25.5 ISCHEMIC CARDIOMYOPATHY: ICD-10-CM

## 2023-03-29 PROCEDURE — 93000 ELECTROCARDIOGRAM COMPLETE: CPT | Performed by: NURSE PRACTITIONER

## 2023-03-29 PROCEDURE — 1159F MED LIST DOCD IN RCRD: CPT | Performed by: NURSE PRACTITIONER

## 2023-03-29 PROCEDURE — 99214 OFFICE O/P EST MOD 30 MIN: CPT | Performed by: NURSE PRACTITIONER

## 2023-03-29 PROCEDURE — 1160F RVW MEDS BY RX/DR IN RCRD: CPT | Performed by: NURSE PRACTITIONER

## 2023-03-29 RX ORDER — CYCLOSPORINE 0.5 MG/ML
EMULSION OPHTHALMIC
COMMUNITY

## 2023-03-29 RX ORDER — NITROGLYCERIN 0.4 MG/1
0.4 TABLET SUBLINGUAL
Qty: 25 TABLET | Refills: 3 | Status: SHIPPED | OUTPATIENT
Start: 2023-03-29

## 2023-03-29 NOTE — PROGRESS NOTES
"Chief Complaint   Patient presents with   • Follow-up     Pt is here for cardiac follow up.  Patient  has hip surgery first part of March 2023  ,had a fall causing injury to hip and now is to,consult with Orthopedics at   .    • Med Refill     Needs Brilinta refills.     • LABS     11/23/2021 last on record.    • Chest Pain     Has some episodes of chest pain. He has taken Nitroglycerin with relief   • Pacemaker Check     St Cam device check March 1,2023       Subjective       Adrian Samuel is a 67 y.o. male with hypertension, hyperlipidemia, insulin-dependent diabetes, and complex cardiac history.  In the past he had PTCA, bypass, numerous cardiac catheterization.  In 2012 biventricular ICD was placed.    On 1/11/19, Cath showed LVEF 30-35%, patent grafts with significantly depressed systolic function. Empiric therapy for ischemia and maxim therapy for cardiomyopathy recommended.  In July 2020 he underwent generator change out. In July 2021. Stress showed lateral wall ischemia with cath advised if problems persisted. EF remained similar to prior at 34%. Carotid US advised for dizziness negative for flow limiting stenosis.    On 3/1/2023 St Cam biventricular ICD interrogation shows 73% atrial and 98% ventricular pacing.  SVT x1 noted AMS less than 1% with longest being 2 hours.  Remaining battery life was 4.6 years.    Today he returns to the office for a follow up visit. After recent hip surgery he fell causing injury to the hip.  He is now scheduled to see orthopedic at  on the fourth of next month with plan to redo surgery then stay in rehab for 4 to 5 weeks postoperatively. From a cardiac standpoint he admits to using nitrostat occasionally which is not a new issue. In fact he admits his pain \"seems a little better\" than it was. No increased shortness of breath or swelling noted. His main concern is having to \"be put back to sleep again for surgery\".     Cardiac History:    Past Surgical History: "   Procedure Laterality Date   • CARDIAC CATHETERIZATION  1993    Cath-PTCA.   • CARDIAC CATHETERIZATION  02/08/2005    Cath-() Patent Grafts   • CARDIAC CATHETERIZATION  08/01/2005    Cath-(Dr. Sigala) Patent Grafts.   • CARDIAC CATHETERIZATION  09/18/2007    Cath-100%LAD, 80%D2. 100%LCX, RCA. Patent Lima & SVG to PDA.   • CARDIAC CATHETERIZATION  08/22/2006    Cath-Patent Grafts   • CARDIAC CATHETERIZATION  01/15/2010    Cath-Patent LIMA & SVG to PDA. 80% OM1-2.25 x 16mm Taxus Stent   • CARDIAC CATHETERIZATION  02/14/2012    Cath-IVUS LAD 60-70%, 2.55 x 20mm Promus stent Ramus   • CARDIAC CATHETERIZATION  03/12/2013    Cath-3.5 x 28Promus stent SVG to RCA.   • CARDIAC CATHETERIZATION  11/25/2013    Cath-(Dr. Sigala) EF30-35%, occluded circ and graft but increase collaterol flow manage medically.   • CARDIAC CATHETERIZATION  12/22/2014    Cath-85% SVG to PDA-   • CARDIAC CATHETERIZATION  03/18/2015    Cath-90%LIMA to LAD-80% SVG to PDA-2.75 x 8 Resolute   • CARDIAC CATHETERIZATION  05/29/2018    80% SVG to PDA- 3.5x30 Resolute Stent.   • CARDIAC DEFIBRILLATOR PLACEMENT  2012    BiV/ICD, SJM   • CARDIOVASCULAR STRESS TEST  02/19/2008    D.Myoview-75%THR. EF 43%. InferoLateral Infarct with Nba-Infarct Ischemia   • CARDIOVASCULAR STRESS TEST  05/08/2009    D.Myoview-78%THR. EF48%. Inferiolateral wall infarct and nba-infarct ischemia.   • CARDIOVASCULAR STRESS TEST  03/03/2010    L.Myoview-Lateral Ischemia   • CARDIOVASCULAR STRESS TEST  02/13/2013    L.Myoview-lateral wall ischemia   • CARDIOVASCULAR STRESS TEST  11/11/2013    L.Myoview-lateral wall ischemia   • CARDIOVASCULAR STRESS TEST  11/18/2014    L.Myoview-([facility]) EF. Inferolateral Infarct   • CARDIOVASCULAR STRESS TEST  03/17/2015    L.Myoview-Sig Anterior Changes   • CARDIOVASCULAR STRESS TEST  03/15/2017    Lexiscan- lateral wall ischemia, nitrodur increased, cath if symptoms persist   • CARDIOVASCULAR STRESS TEST  11/13/2018    L. Cardiolite- EF  34%. Inferolateral Infarct with periinfarct Ischemia.   • CARDIOVASCULAR STRESS TEST  08/23/2021    Lexiscan- EF 34%. Lateral Ischemia   • CONVERTED (HISTORICAL) CARDIOVASCULAR STUDIES  09/05/2012    MUGA-RVEF 41%, LVEF 38%   • CONVERTED (HISTORICAL) HOLTER  06/11/2006    Holter-PVC's noted   • CORONARY ARTERY BYPASS GRAFT  1995    CABG LIMA TO LAD, SVG TO OM and SVG to PDA.   • ECHO - CONVERTED  08/29/2007    Echo-EF50-55%, mild to mod. MR, mildly hypokinetic septum.   • ECHO - CONVERTED  05/08/2009    Echo-EF45-49%, Moderate MR and PA pressure-42 mmHg.   • ECHO - CONVERTED  01/18/2012    Echo-EF 25-30%   • ECHO - CONVERTED  05/17/2012    Echo-EF<30%. AnteroSpectal WMA.   • ECHO - CONVERTED  09/02/2015    Echo-EF 30-35%, mod to severe MR   • ECHO - CONVERTED  03/15/2017    EF 35-40%, RVSP 25-30 mmHg   • ECHO - CONVERTED  11/13/2018    EF 35%. Inferolateral WMA. Mild- Mod MR. RVSP- 30 mmHg   • ECHO - CONVERTED  08/23/2021    EF 35%. Lateral WMA. LA- 4.7 Cm. Trace MR & AI. RVSP- 28 mmHg   • OTHER SURGICAL HISTORY  02/28/2014    U/S Aorta-   • US CAROTID UNILATERAL  05/21/2015    Carotid US-no hemodyanamically significant stenosis   • US CAROTID UNILATERAL  04/10/1989    Carotid US-No sig. stenosis noted       Current Outpatient Medications   Medication Sig Dispense Refill   • apixaban (ELIQUIS) 2.5 MG tablet tablet 1 tablet.     • aspirin 81 MG EC tablet Take 1 tablet by mouth Daily.     • atorvastatin (LIPITOR) 80 MG tablet TAKE 1 TABLET BY MOUTH DAILY. 90 tablet 3   • baclofen (LIORESAL) 10 MG tablet Take 1 tablet by mouth Daily As Needed for Muscle Spasms.     • carbidopa-levodopa (SINEMET)  MG per tablet Take 2 tablets by mouth 2 (Two) Times a Day.     • cetirizine (ZyrTEC) 10 MG tablet Take 1 tablet by mouth Daily.     • Cholecalciferol 50 MCG (2000 UT) tablet Take 1 tablet by mouth Daily.     • citalopram (CeleXA) 40 MG tablet Take 1 tablet by mouth Daily.     • cycloSPORINE (RESTASIS) 0.05 % ophthalmic  "emulsion TWICE DAILY     • empagliflozin (JARDIANCE) 25 MG tablet tablet Take 1 tablet by mouth Daily.     • ezetimibe (Zetia) 10 MG tablet Take 1 tablet by mouth Daily. 90 tablet 3   • HYDROcodone-acetaminophen (NORCO)  MG per tablet Take 1 tablet by mouth Every 6 (Six) Hours As Needed for Moderate Pain.     • Insulin Aspart Prot & Aspart (NOVOLOG MIX 70/30 PENFILL SC) Inject  under the skin.     • nebivolol (BYSTOLIC) 5 MG tablet Take 1 tablet by mouth Daily. 90 tablet 3   • nitroglycerin (NITROSTAT) 0.4 MG SL tablet Place 1 tablet under the tongue Every 5 (Five) Minutes As Needed for Chest Pain. Take no more than 3 doses in 15 minutes. 25 tablet 3   • O2 (OXYGEN) Inhale 2 L/min at night as needed.     • ondansetron (ZOFRAN) 8 MG tablet Take 1 tablet by mouth 3 (Three) Times a Day As Needed for Nausea or Vomiting.     • ONE TOUCH ULTRA TEST test strip   0   • ONETOUCH DELICA LANCETS 33G misc   0   • pantoprazole (PROTONIX) 40 MG EC tablet Take 1 tablet by mouth Daily. 90 tablet 3   • polyethylene glycol (MIRALAX) packet Take 17 g by mouth Daily As Needed.     • potassium chloride (K-DUR) 10 MEQ CR tablet Take 1 tablet by mouth As Needed.  2   • pregabalin (LYRICA) 100 MG capsule Take 75 mg by mouth 2 (Two) Times a Day.     • sacubitril-valsartan (ENTRESTO) 24-26 MG tablet Take 1 tablet by mouth 2 (Two) Times a Day. 180 tablet 3   • SITagliptin (JANUVIA) 100 MG tablet Take 1 tablet by mouth Daily.     • SURE COMFORT INSULIN SYRINGE 31G X 5/16\" 1 ML misc   6   • Trulicity 0.75 MG/0.5ML solution pen-injector Inject 0.75 mg under the skin into the appropriate area as directed Every 7 (Seven) Days.       No current facility-administered medications for this visit.       Effient [prasugrel], Imdur [isosorbide dinitrate], Livalo [pitavastatin], and Ranolazine    Past Medical History:   Diagnosis Date   • Disease of thyroid gland 04/10/2009    small colloid cyst right thyroid   • Esophageal dilatation     Followed " by Dr. Kyle   • Heel spurs removed    • Hypercholesterolemia    • Hypertension     Systemic   • IHD (ischemic heart disease)    • Other abnormal cytological findings on specimens from anus 2006 & 2007    EECP   • Pain     Chronic       Social History     Socioeconomic History   • Marital status:    Tobacco Use   • Smoking status: Former   • Smokeless tobacco: Current     Types: Chew   • Tobacco comments:     11/24/2015   Vaping Use   • Vaping Use: Never used   Substance and Sexual Activity   • Alcohol use: No   • Drug use: Defer   • Sexual activity: Defer       Family History   Problem Relation Age of Onset   • Heart disease Mother    • Stroke Mother    • Diabetes Mother    • Diabetes Father    • Cirrhosis Father    • Heart disease Other    • Diabetes Other        Review of Systems   Constitutional: Positive for malaise/fatigue and weight loss.   HENT: Negative for nosebleeds.    Cardiovascular: Positive for chest pain. Negative for leg swelling and palpitations.   Respiratory: Positive for shortness of breath. Negative for sleep disturbances due to breathing.    Endocrine: Positive for cold intolerance and heat intolerance.   Musculoskeletal: Positive for falls, joint pain and stiffness.   Gastrointestinal: Negative for change in bowel habit, heartburn and melena.   Genitourinary: Negative for dysuria and hematuria.   Neurological: Positive for loss of balance (using crutches for ambulation).   Psychiatric/Behavioral: Negative for memory loss. The patient is nervous/anxious.         BP Readings from Last 5 Encounters:   03/29/23 110/56   08/03/22 120/60   01/05/22 108/68   07/21/21 130/60   12/16/20 122/66       Wt Readings from Last 5 Encounters:   03/29/23 88.5 kg (195 lb)   08/03/22 91.4 kg (201 lb 9.6 oz)   01/05/22 90.4 kg (199 lb 3.2 oz)   07/21/21 87.5 kg (193 lb)   12/16/20 91.4 kg (201 lb 9.6 oz)       Objective     /56 (BP Location: Left arm, Patient Position: Sitting)   Pulse 98   Ht  "177.8 cm (70\")   Wt 88.5 kg (195 lb)   BMI 27.98 kg/m²     Vitals and nursing note reviewed.   Constitutional:       Appearance: Healthy appearance. Not in distress.   Eyes:      Conjunctiva/sclera: Conjunctivae normal.      Pupils: Pupils are equal, round, and reactive to light.   HENT:      Head: Normocephalic.   Neck:      Vascular: No carotid bruit.   Pulmonary:      Effort: Pulmonary effort is normal.      Breath sounds: Normal breath sounds.   Chest:      Chest wall: Not tender to palpatation.   Cardiovascular:      PMI at left midclavicular line. Normal rate. Regular rhythm.      Murmurs: There is no murmur.   Pulses:     Intact distal pulses.   Edema:     Peripheral edema absent.   Abdominal:      General: Bowel sounds are normal.      Palpations: Abdomen is soft.   Musculoskeletal:      Cervical back: Normal range of motion and neck supple. Skin:     General: Skin is warm and dry.   Neurological:      Mental Status: Alert, oriented to person, place, and time and oriented to person, place and time.   Psychiatric:         Mood and Affect: Mood normal.         Behavior: Behavior is cooperative.            ECG 12 Lead    Date/Time: 3/29/2023 4:34 PM  Performed by: Claudia Cerda APRN  Authorized by: Claudia Cerda APRN   Comparison: compared with previous ECG from 2/13/2019  Comparison to previous ECG: Prior EKG AV paced, current atrial sensed and ventricular paced.   Rhythm: paced  BPM: 98                   Assessment & Plan   Diagnoses and all orders for this visit:    1. IHD (ischemic heart disease) (Primary)  -     nitroglycerin (NITROSTAT) 0.4 MG SL tablet; Place 1 tablet under the tongue Every 5 (Five) Minutes As Needed for Chest Pain. Take no more than 3 doses in 15 minutes.  Dispense: 25 tablet; Refill: 3  -     ECG 12 Lead    2. Ischemic cardiomyopathy    3. Congestive heart failure with LV diastolic dysfunction, NYHA class 2 (HCC)    4. Hypercholesteremia    5. Presence of biventricular " implantable cardioverter-defibrillator (ICD)  -     ECG 12 Lead    6. Paced rhythm on electrocardiogram (ECG)  -     ECG 12 Lead    7. Type 2 diabetes mellitus with other circulatory complication, with long-term current use of insulin (HCC)      IHD  -Stress test 2019 showed small area of ischemia being managed medically.    Cardiomegaly/HFrEF  - Clinically appears well compensated  - It has been over 2 years since last echocardiogram.  Patient has some concerns about heart function given he will need second surgery in short period of time.  We will repeat echocardiogram to establish cardiac status prior to repeat hip surgery.  -Continue beta-blocker, if EF decreased will consider changing to metoprolol succinate or carvedilol.  -Continue Entresto 24-26 twice daily, aspirin, statin    Hypercholesterolemia  -continue high intensity statin, Lipitor 80 mg daily  -pt will follow with your office for lab orders    BiV ICD  -EKG shows sinus with ventricular pacing  -keep appointment for interrogation in September    Further recommendations based on echo results.

## 2023-04-03 ENCOUNTER — TELEPHONE (OUTPATIENT)
Dept: CARDIOLOGY | Facility: CLINIC | Age: 68
End: 2023-04-03
Payer: MEDICARE

## 2023-04-03 DIAGNOSIS — I25.5 ISCHEMIC CARDIOMYOPATHY: ICD-10-CM

## 2023-04-03 DIAGNOSIS — R06.02 SHORTNESS OF BREATH: ICD-10-CM

## 2023-04-03 DIAGNOSIS — R07.89 OTHER CHEST PAIN: ICD-10-CM

## 2023-04-03 DIAGNOSIS — I25.9 IHD (ISCHEMIC HEART DISEASE): Primary | ICD-10-CM

## 2023-04-03 DIAGNOSIS — Z95.810 PRESENCE OF BIVENTRICULAR IMPLANTABLE CARDIOVERTER-DEFIBRILLATOR (ICD): ICD-10-CM

## 2023-04-03 NOTE — TELEPHONE ENCOUNTER
Patients daughter Jocelyn called to ask for repeat Echocardiogram , she said they are ready to schedule at anytime. . She requested to be scheduled at  [facility]. If you can place order I can schedule appointment and Precert it.

## 2023-04-03 NOTE — TELEPHONE ENCOUNTER
Echocardiogram scheduled at [facility] for Thursday April 3,2023 . Patients daughter Jocelyn aware of appointment.

## 2023-04-21 ENCOUNTER — TELEPHONE (OUTPATIENT)
Dept: CARDIOLOGY | Facility: CLINIC | Age: 68
End: 2023-04-21
Payer: MEDICARE

## 2023-04-21 NOTE — TELEPHONE ENCOUNTER
Appointment rescheduled for Echo at [facility] April 25,2023 at 2:00pm .  Jocelyn made aware.

## 2023-04-23 PROCEDURE — 93295 DEV INTERROG REMOTE 1/2/MLT: CPT | Performed by: INTERNAL MEDICINE

## 2023-04-23 PROCEDURE — 93296 REM INTERROG EVL PM/IDS: CPT | Performed by: INTERNAL MEDICINE

## 2023-07-23 PROCEDURE — 93296 REM INTERROG EVL PM/IDS: CPT | Performed by: INTERNAL MEDICINE

## 2023-07-23 PROCEDURE — 93295 DEV INTERROG REMOTE 1/2/MLT: CPT | Performed by: INTERNAL MEDICINE

## 2023-09-06 ENCOUNTER — OFFICE VISIT (OUTPATIENT)
Dept: CARDIOLOGY | Facility: CLINIC | Age: 68
End: 2023-09-06
Payer: MEDICARE

## 2023-09-06 ENCOUNTER — CLINICAL SUPPORT NO REQUIREMENTS (OUTPATIENT)
Dept: CARDIOLOGY | Facility: CLINIC | Age: 68
End: 2023-09-06
Payer: MEDICARE

## 2023-09-06 VITALS
HEART RATE: 64 BPM | HEIGHT: 70 IN | DIASTOLIC BLOOD PRESSURE: 60 MMHG | SYSTOLIC BLOOD PRESSURE: 110 MMHG | BODY MASS INDEX: 26.43 KG/M2 | WEIGHT: 184.6 LBS

## 2023-09-06 DIAGNOSIS — I25.5 ISCHEMIC CARDIOMYOPATHY: ICD-10-CM

## 2023-09-06 DIAGNOSIS — E78.00 HYPERCHOLESTEREMIA: ICD-10-CM

## 2023-09-06 DIAGNOSIS — I25.9 IHD (ISCHEMIC HEART DISEASE): Primary | ICD-10-CM

## 2023-09-06 DIAGNOSIS — I50.30 CONGESTIVE HEART FAILURE WITH LV DIASTOLIC DYSFUNCTION, NYHA CLASS 2: ICD-10-CM

## 2023-09-06 DIAGNOSIS — I10 ESSENTIAL HYPERTENSION: ICD-10-CM

## 2023-09-06 DIAGNOSIS — I25.5 ISCHEMIC CARDIOMYOPATHY: Primary | ICD-10-CM

## 2023-09-06 DIAGNOSIS — Z95.810 PRESENCE OF BIVENTRICULAR IMPLANTABLE CARDIOVERTER-DEFIBRILLATOR (ICD): ICD-10-CM

## 2023-09-06 NOTE — PROGRESS NOTES
Chief Complaint   Patient presents with    Follow-up     Cardiac management.  Has no cardiac complaints today    LABS     Has routine labs per PCP    Pacemaker Check     St Cam device check today.    Med Refill     No refills needed today.       Subjective       Adrian Samuel is a 68 y.o. malewith hypertension, hyperlipidemia, insulin-dependent diabetes, and complex cardiac history.  In the past he had PTCA, bypass, numerous cardiac catheterization.  In 2012 biventricular ICD was placed.     On 1/11/19, Cath showed LVEF 30-35%, patent grafts with significantly depressed systolic function. Empiric therapy for ischemia and maxim therapy for cardiomyopathy recommended.  In July 2020 he underwent generator change out. In July 2021. Stress showed lateral wall ischemia with cath advised if problems persisted. EF remained similar to prior at 34%. Carotid US advised for dizziness negative for flow limiting stenosis.    At March 2023 office visit he reported recent fall causing injury to his hip.  He was scheduled to see orthopedic at  for redo surgery and follow-up rehab.    5/4/2023 echocardiogram showed LVEF 35% with moderate global hypokinesis.    7/14/2023 remote BiV ICD interrogation: Suggestive inappropriate mode switching otherwise normal function with battery at 3.92 years.  RA lead noise noted.    Today returns the office for follow-up visit.  He was able to avoid having surgery on his hip, continues conservative management.  He denies recurrent falls or significant issues with hip pain.  In regards to cardiac he denies new or worsening symptoms.  Angina remains stable, requiring 1 or 2 Nitrostat's some evenings when he is tired and symptoms resolved.  At times he feels some of his chest discomfort could be from drinking more kumar.  TIA symptoms, palpitations, increase shortness of breath or edema denied.      Cardiac History:    Past Surgical History:   Procedure Laterality Date    CARDIAC CATHETERIZATION   1993    Cath-PTCA.    CARDIAC CATHETERIZATION  02/08/2005    Cath-() Patent Grafts    CARDIAC CATHETERIZATION  08/01/2005    Cath-(Dr. Sigala) Patent Grafts.    CARDIAC CATHETERIZATION  09/18/2007    Cath-100%LAD, 80%D2. 100%LCX, RCA. Patent Lima & SVG to PDA.    CARDIAC CATHETERIZATION  08/22/2006    Cath-Patent Grafts    CARDIAC CATHETERIZATION  01/15/2010    Cath-Patent LIMA & SVG to PDA. 80% OM1-2.25 x 16mm Taxus Stent    CARDIAC CATHETERIZATION  02/14/2012    Cath-IVUS LAD 60-70%, 2.55 x 20mm Promus stent Ramus    CARDIAC CATHETERIZATION  03/12/2013    Cath-3.5 x 28Promus stent SVG to RCA.    CARDIAC CATHETERIZATION  11/25/2013    Cath-(Dr. Sigala) EF30-35%, occluded circ and graft but increase collaterol flow manage medically.    CARDIAC CATHETERIZATION  12/22/2014    Cath-85% SVG to PDA-    CARDIAC CATHETERIZATION  03/18/2015    Cath-90%LIMA to LAD-80% SVG to PDA-2.75 x 8 Resolute    CARDIAC CATHETERIZATION  05/29/2018    80% SVG to PDA- 3.5x30 Resolute Stent.    CARDIAC DEFIBRILLATOR PLACEMENT  2012    BiV/ICD, SJM    CARDIOVASCULAR STRESS TEST  02/19/2008    D.Myoview-75%THR. EF 43%. InferoLateral Infarct with Nba-Infarct Ischemia    CARDIOVASCULAR STRESS TEST  05/08/2009    D.Myoview-78%THR. EF48%. Inferiolateral wall infarct and nba-infarct ischemia.    CARDIOVASCULAR STRESS TEST  03/03/2010    L.Myoview-Lateral Ischemia    CARDIOVASCULAR STRESS TEST  02/13/2013    L.Myoview-lateral wall ischemia    CARDIOVASCULAR STRESS TEST  11/11/2013    L.Myoview-lateral wall ischemia    CARDIOVASCULAR STRESS TEST  11/18/2014    L.Myoview-([facility]) EF. Inferolateral Infarct    CARDIOVASCULAR STRESS TEST  03/17/2015    L.Myoview-Sig Anterior Changes    CARDIOVASCULAR STRESS TEST  03/15/2017    Lexiscan- lateral wall ischemia, nitrodur increased, cath if symptoms persist    CARDIOVASCULAR STRESS TEST  11/13/2018    L. Cardiolite- EF 34%. Inferolateral Infarct with periinfarct Ischemia.    CARDIOVASCULAR  STRESS TEST  08/23/2021    Lexiscan- EF 34%. Lateral Ischemia    CONVERTED (HISTORICAL) CARDIOVASCULAR STUDIES  09/05/2012    MUGA-RVEF 41%, LVEF 38%    CONVERTED (HISTORICAL) HOLTER  06/11/2006    Holter-PVC's noted    CORONARY ARTERY BYPASS GRAFT  1995    CABG LIMA TO LAD, SVG TO OM and SVG to PDA.    ECHO - CONVERTED  08/29/2007    Echo-EF50-55%, mild to mod. MR, mildly hypokinetic septum.    ECHO - CONVERTED  05/08/2009    Echo-EF45-49%, Moderate MR and PA pressure-42 mmHg.    ECHO - CONVERTED  01/18/2012    Echo-EF 25-30%    ECHO - CONVERTED  05/17/2012    Echo-EF<30%. AnteroSpectal WMA.    ECHO - CONVERTED  09/02/2015    Echo-EF 30-35%, mod to severe MR    ECHO - CONVERTED  03/15/2017    EF 35-40%, RVSP 25-30 mmHg    ECHO - CONVERTED  11/13/2018    EF 35%. Inferolateral WMA. Mild- Mod MR. RVSP- 30 mmHg    ECHO - CONVERTED  08/23/2021    EF 35%. Lateral WMA. LA- 4.7 Cm. Trace MR & AI. RVSP- 28 mmHg    OTHER SURGICAL HISTORY  02/28/2014    U/S Aorta-    US CAROTID UNILATERAL  05/21/2015    Carotid US-no hemodyanamically significant stenosis    US CAROTID UNILATERAL  04/10/1989    Carotid US-No sig. stenosis noted       Current Outpatient Medications   Medication Sig Dispense Refill    aspirin 81 MG EC tablet Take 1 tablet by mouth Daily.      atorvastatin (LIPITOR) 80 MG tablet TAKE 1 TABLET BY MOUTH DAILY. 90 tablet 3    baclofen (LIORESAL) 10 MG tablet Take 1 tablet by mouth Daily As Needed for Muscle Spasms.      carbidopa-levodopa (SINEMET)  MG per tablet Take 2 tablets by mouth 2 (Two) Times a Day.      cetirizine (ZyrTEC) 10 MG tablet Take 1 tablet by mouth Daily.      Cholecalciferol 50 MCG (2000 UT) tablet Take 1 tablet by mouth Daily.      citalopram (CeleXA) 40 MG tablet Take 1 tablet by mouth Daily.      cycloSPORINE (RESTASIS) 0.05 % ophthalmic emulsion TWICE DAILY      empagliflozin (JARDIANCE) 25 MG tablet tablet Take 1 tablet by mouth Daily.      ezetimibe (Zetia) 10 MG tablet Take 1 tablet  "by mouth Daily. 90 tablet 3    HYDROcodone-acetaminophen (NORCO)  MG per tablet Take 1 tablet by mouth Every 6 (Six) Hours As Needed for Moderate Pain.      Insulin Aspart Prot & Aspart (NOVOLOG MIX 70/30 PENFILL SC) Inject  under the skin.      nebivolol (BYSTOLIC) 5 MG tablet Take 1 tablet by mouth Daily. 90 tablet 3    nitroglycerin (NITROSTAT) 0.4 MG SL tablet Place 1 tablet under the tongue Every 5 (Five) Minutes As Needed for Chest Pain. Take no more than 3 doses in 15 minutes. 25 tablet 3    O2 (OXYGEN) Inhale 2 L/min at night as needed.      ondansetron (ZOFRAN) 8 MG tablet Take 1 tablet by mouth 3 (Three) Times a Day As Needed for Nausea or Vomiting.      ONE TOUCH ULTRA TEST test strip   0    ONETOUCH DELICA LANCETS 33G misc   0    pantoprazole (PROTONIX) 40 MG EC tablet Take 1 tablet by mouth Daily. 90 tablet 3    polyethylene glycol (MIRALAX) packet Take 17 g by mouth Daily As Needed.      potassium chloride (K-DUR) 10 MEQ CR tablet Take 1 tablet by mouth As Needed.  2    pregabalin (LYRICA) 100 MG capsule Take 75 mg by mouth 2 (Two) Times a Day.      sacubitril-valsartan (ENTRESTO) 24-26 MG tablet Take 1 tablet by mouth 2 (Two) Times a Day. 180 tablet 3    SITagliptin (JANUVIA) 100 MG tablet Take 1 tablet by mouth Daily.      SURE COMFORT INSULIN SYRINGE 31G X 5/16\" 1 ML misc   6    ticagrelor (BRILINTA) 90 MG tablet tablet Take 1 tablet by mouth 2 (Two) Times a Day.      Trulicity 0.75 MG/0.5ML solution pen-injector Inject 0.75 mg under the skin into the appropriate area as directed Every 7 (Seven) Days.       No current facility-administered medications for this visit.       Effient [prasugrel], Imdur [isosorbide dinitrate], Livalo [pitavastatin], and Ranolazine    Past Medical History:   Diagnosis Date    Disease of thyroid gland 04/10/2009    small colloid cyst right thyroid    Esophageal dilatation     Followed by Dr. Kyle    Heel spurs removed     History of left hip replacement 2023    " "Hypercholesterolemia     Hypertension     Systemic    IHD (ischemic heart disease)     Other abnormal cytological findings on specimens from anus 2006 & 2007    EECP    Pain     Chronic       Social History     Socioeconomic History    Marital status:    Tobacco Use    Smoking status: Former    Smokeless tobacco: Current     Types: Chew    Tobacco comments:     11/24/2015   Vaping Use    Vaping Use: Never used   Substance and Sexual Activity    Alcohol use: No    Drug use: Defer    Sexual activity: Defer       Family History   Problem Relation Age of Onset    Heart disease Mother     Stroke Mother     Diabetes Mother     Diabetes Father     Cirrhosis Father     Heart disease Other     Diabetes Other        Review of Systems   Constitutional: Positive for malaise/fatigue (Same) and weight loss (Admits to eating less).   HENT:  Negative for hearing loss, hoarse voice and nosebleeds.    Cardiovascular:  Positive for chest pain. Negative for leg swelling, near-syncope and palpitations.   Hematologic/Lymphatic: Negative for bleeding problem. Does not bruise/bleed easily.   Musculoskeletal:  Negative for falls.   Gastrointestinal:  Negative for abdominal pain, dysphagia, melena and nausea.   Genitourinary:  Negative for hematuria.   Neurological:  Negative for dizziness.   Psychiatric/Behavioral:  Positive for depression (Since wife passed away in February 2021). Negative for memory loss and suicidal ideas.       BP Readings from Last 5 Encounters:   09/06/23 110/60   03/29/23 110/56   08/03/22 120/60   01/05/22 108/68   07/21/21 130/60       Wt Readings from Last 5 Encounters:   09/06/23 83.7 kg (184 lb 9.6 oz)   03/29/23 88.5 kg (195 lb)   08/03/22 91.4 kg (201 lb 9.6 oz)   01/05/22 90.4 kg (199 lb 3.2 oz)   07/21/21 87.5 kg (193 lb)       Objective     /60 (BP Location: Left arm, Patient Position: Sitting)   Pulse 64   Ht 177.8 cm (70\")   Wt 83.7 kg (184 lb 9.6 oz)   BMI 26.49 kg/m²     Vitals and " nursing note reviewed.   Constitutional:       Appearance: Well-groomed and not in distress.   Eyes:      Conjunctiva/sclera: Conjunctivae normal.      Pupils: Pupils are equal, round, and reactive to light.   HENT:      Head: Normocephalic.   Pulmonary:      Effort: Pulmonary effort is normal.      Breath sounds: Normal breath sounds.   Cardiovascular:      PMI at left midclavicular line. Normal rate. Regular rhythm.      Murmurs: There is a grade 2/6 systolic murmur.   Edema:     Peripheral edema absent.   Abdominal:      General: Bowel sounds are normal.      Palpations: Abdomen is soft.   Musculoskeletal: Normal range of motion.      Cervical back: Normal range of motion and neck supple. Skin:     General: Skin is warm and dry.   Neurological:      Mental Status: Alert, oriented to person, place, and time and oriented to person, place and time.   Psychiatric:         Behavior: Behavior is cooperative.        Procedures: ICD interrogation done today         Assessment & Plan   Diagnoses and all orders for this visit:    1. IHD (ischemic heart disease) (Primary)    2. Ischemic cardiomyopathy    3. Congestive heart failure with LV diastolic dysfunction, NYHA class 2    4. Presence of biventricular implantable cardioverter-defibrillator (ICD)    5. Essential hypertension    6. Hypercholesteremia      IHD  -Stress test 2019 small area of ischemia  -Admits to stable angina, no worse  -Continue Brilinta, aspirin Lipitor  -Continue as needed Nitrostat  -Patient has declined repeat cardiac work-up at this time but agrees to call for worsening concerns.    Cardiomegaly/HFrEF  -May 2023 echocardiogram LVEF stable at 35%  -Continue Entresto, Bystolic  -Currently on Jardiance, consider changing to Farxiga 10 mg daily for heart failure benefit.      BiV ICD  -Interrogation today shows normal function  -At next visit will schedule annual device check, otherwise will monitoring remotely from home.      Hypercholesterolemia  -Continue Lipitor, Zetia  -If labs not at goal consider PCSK9 inhibitor    6-month follow-up visit scheduled.

## 2023-10-22 PROCEDURE — 93295 DEV INTERROG REMOTE 1/2/MLT: CPT | Performed by: INTERNAL MEDICINE

## 2023-10-22 PROCEDURE — 93296 REM INTERROG EVL PM/IDS: CPT | Performed by: INTERNAL MEDICINE

## 2023-10-23 DIAGNOSIS — I25.9 IHD (ISCHEMIC HEART DISEASE): ICD-10-CM

## 2023-10-23 RX ORDER — NITROGLYCERIN 0.4 MG/1
0.4 TABLET SUBLINGUAL
Qty: 25 TABLET | Refills: 3 | Status: SHIPPED | OUTPATIENT
Start: 2023-10-23

## 2024-01-04 DIAGNOSIS — E78.2 MIXED HYPERLIPIDEMIA: ICD-10-CM

## 2024-01-04 DIAGNOSIS — I10 ESSENTIAL HYPERTENSION: ICD-10-CM

## 2024-01-21 PROCEDURE — 93296 REM INTERROG EVL PM/IDS: CPT | Performed by: INTERNAL MEDICINE

## 2024-01-21 PROCEDURE — 93295 DEV INTERROG REMOTE 1/2/MLT: CPT | Performed by: INTERNAL MEDICINE

## 2024-01-22 DIAGNOSIS — I25.9 IHD (ISCHEMIC HEART DISEASE): ICD-10-CM

## 2024-01-23 RX ORDER — NITROGLYCERIN 0.4 MG/1
0.4 TABLET SUBLINGUAL
Qty: 25 TABLET | Refills: 3 | Status: SHIPPED | OUTPATIENT
Start: 2024-01-23

## 2024-01-24 RX ORDER — ATORVASTATIN CALCIUM 80 MG/1
80 TABLET, FILM COATED ORAL DAILY
Qty: 90 TABLET | Refills: 3 | Status: SHIPPED | OUTPATIENT
Start: 2024-01-24

## 2024-01-24 RX ORDER — EZETIMIBE 10 MG/1
10 TABLET ORAL DAILY
Qty: 90 TABLET | Refills: 3 | Status: SHIPPED | OUTPATIENT
Start: 2024-01-24

## 2024-01-24 RX ORDER — NEBIVOLOL 5 MG/1
5 TABLET ORAL DAILY
Qty: 90 TABLET | Refills: 3 | Status: SHIPPED | OUTPATIENT
Start: 2024-01-24

## 2024-01-24 RX ORDER — PANTOPRAZOLE SODIUM 40 MG/1
40 TABLET, DELAYED RELEASE ORAL DAILY
Qty: 90 TABLET | Refills: 3 | Status: SHIPPED | OUTPATIENT
Start: 2024-01-24

## 2024-01-26 ENCOUNTER — TELEPHONE (OUTPATIENT)
Dept: CARDIOLOGY | Facility: CLINIC | Age: 69
End: 2024-01-26
Payer: MEDICARE

## 2024-01-26 NOTE — TELEPHONE ENCOUNTER
Order for eliquis in. Stop ASA. Continue Brilinta. 6 hours of afib on pacer check. Come on Monday for EKG.

## 2024-01-26 NOTE — TELEPHONE ENCOUNTER
Pt aware of AFib noted on PPM check. Advised of recommendations to stop ASA, start Eliquis 5 mg twice a day, come for EKG on Monday 1/29/24 at 3:15. Pt also aware to continue Brilinta.

## 2024-01-26 NOTE — TELEPHONE ENCOUNTER
Per remote transmission patient had 9 AMS episodes on 01/24/24.  His longest episode of afib was 6 hours on 01/24/24. Presenting EGM at time of transmission was AP-BiV paced. Patient is not currently on anticoagulation therapy. Report is in [facility] for review.

## 2024-01-29 ENCOUNTER — CLINICAL SUPPORT (OUTPATIENT)
Dept: CARDIOLOGY | Facility: CLINIC | Age: 69
End: 2024-01-29
Payer: MEDICARE

## 2024-01-29 DIAGNOSIS — I48.0 PAF (PAROXYSMAL ATRIAL FIBRILLATION): Primary | ICD-10-CM

## 2024-01-29 DIAGNOSIS — R06.02 SHORTNESS OF BREATH: ICD-10-CM

## 2024-01-29 DIAGNOSIS — Z78.9 PACED RHYTHM ON ELECTROCARDIOGRAM (ECG): Primary | ICD-10-CM

## 2024-01-29 DIAGNOSIS — E78.2 MIXED HYPERLIPIDEMIA: ICD-10-CM

## 2024-01-29 DIAGNOSIS — Z79.4 TYPE 2 DIABETES MELLITUS WITH OTHER CIRCULATORY COMPLICATION, WITH LONG-TERM CURRENT USE OF INSULIN: ICD-10-CM

## 2024-01-29 DIAGNOSIS — E11.59 TYPE 2 DIABETES MELLITUS WITH OTHER CIRCULATORY COMPLICATION, WITH LONG-TERM CURRENT USE OF INSULIN: ICD-10-CM

## 2024-01-29 DIAGNOSIS — R07.89 OTHER CHEST PAIN: ICD-10-CM

## 2024-01-29 DIAGNOSIS — I25.9 IHD (ISCHEMIC HEART DISEASE): ICD-10-CM

## 2024-01-29 DIAGNOSIS — I10 ESSENTIAL HYPERTENSION: ICD-10-CM

## 2024-01-29 DIAGNOSIS — I25.5 ISCHEMIC CARDIOMYOPATHY: ICD-10-CM

## 2024-01-29 DIAGNOSIS — I48.0 PAF (PAROXYSMAL ATRIAL FIBRILLATION): ICD-10-CM

## 2024-01-29 PROCEDURE — 93000 ELECTROCARDIOGRAM COMPLETE: CPT | Performed by: NURSE PRACTITIONER

## 2024-01-29 NOTE — PROGRESS NOTES
Procedure     ECG 12 Lead    Date/Time: 1/29/2024 5:01 PM  Performed by: Claudia Cerda APRN    Authorized by: Claudia Cerda APRN  Comparison: compared with previous ECG from 3/29/2023  Comparison to previous ECG: Previous EKG sinus with ventricular pacing, current atrial and ventricular paced.  Rhythm: paced  BPM: 94

## 2024-01-29 NOTE — TELEPHONE ENCOUNTER
I spoke with patient and he did  Eliquis from pharmacy and started taking it on Friday.  He does report having chest pain and pain going down his arm.   He has been taking Nitro SL tabs daily with some relief.   He is scheduled to come in office today  for EKG.

## 2024-01-29 NOTE — TELEPHONE ENCOUNTER
Claudia, see telephone encounter from 1/26/24.  Lou gave the recommendations noted.  I advised the patient. He is coming today for an EKG. Brigida aware to give you the EKG for review.

## 2024-02-07 ENCOUNTER — TELEPHONE (OUTPATIENT)
Dept: CARDIOLOGY | Facility: CLINIC | Age: 69
End: 2024-02-07
Payer: MEDICARE

## 2024-02-07 NOTE — TELEPHONE ENCOUNTER
[facility] team notified, patient's [facility] ([facility]) has denied nuclear stress test.  They did approve the echo.  We have fought and fought with [facility] for this stress test approval, it basically came down they said because timeframe since last office visit (9/6/2023).

## 2024-02-08 ENCOUNTER — APPOINTMENT (OUTPATIENT)
Dept: CARDIOLOGY | Facility: HOSPITAL | Age: 69
End: 2024-02-08
Payer: MEDICARE

## 2024-02-08 ENCOUNTER — HOSPITAL ENCOUNTER (OUTPATIENT)
Dept: CARDIOLOGY | Facility: HOSPITAL | Age: 69
Discharge: HOME | End: 2024-02-08
Payer: MEDICARE

## 2024-02-08 ENCOUNTER — LAB (OUTPATIENT)
Dept: LAB | Facility: HOSPITAL | Age: 69
End: 2024-02-08
Payer: MEDICARE

## 2024-02-08 DIAGNOSIS — I48.0 PAF (PAROXYSMAL ATRIAL FIBRILLATION): ICD-10-CM

## 2024-02-08 DIAGNOSIS — R06.02 SHORTNESS OF BREATH: ICD-10-CM

## 2024-02-08 DIAGNOSIS — E78.2 MIXED HYPERLIPIDEMIA: ICD-10-CM

## 2024-02-08 DIAGNOSIS — I25.9 IHD (ISCHEMIC HEART DISEASE): ICD-10-CM

## 2024-02-08 DIAGNOSIS — E11.59 TYPE 2 DIABETES MELLITUS WITH OTHER CIRCULATORY COMPLICATION, WITH LONG-TERM CURRENT USE OF INSULIN: ICD-10-CM

## 2024-02-08 DIAGNOSIS — R07.89 OTHER CHEST PAIN: ICD-10-CM

## 2024-02-08 DIAGNOSIS — Z79.4 TYPE 2 DIABETES MELLITUS WITH OTHER CIRCULATORY COMPLICATION, WITH LONG-TERM CURRENT USE OF INSULIN: ICD-10-CM

## 2024-02-08 DIAGNOSIS — I25.5 ISCHEMIC CARDIOMYOPATHY: ICD-10-CM

## 2024-02-08 LAB
ALBUMIN SERPL-MCNC: 4.4 G/DL (ref 3.5–5.2)
ALBUMIN/GLOB SERPL: 2 G/DL
ALP SERPL-CCNC: 55 U/L (ref 39–117)
ALT SERPL W P-5'-P-CCNC: 14 U/L (ref 1–41)
ANION GAP SERPL CALCULATED.3IONS-SCNC: 12.8 MMOL/L (ref 5–15)
AST SERPL-CCNC: 14 U/L (ref 1–40)
BH CV ECHO MEAS - ACS: 1.82 CM
BH CV ECHO MEAS - AO MAX PG: 6.5 MMHG
BH CV ECHO MEAS - AO MEAN PG: 4.4 MMHG
BH CV ECHO MEAS - AO ROOT DIAM: 2.8 CM
BH CV ECHO MEAS - AO V2 MAX: 127.6 CM/SEC
BH CV ECHO MEAS - AO V2 VTI: 27.6 CM
BH CV ECHO MEAS - EDV(CUBED): 225.9 ML
BH CV ECHO MEAS - EDV(MOD-SP4): 240 ML
BH CV ECHO MEAS - EF(MOD-SP4): 34.6 %
BH CV ECHO MEAS - EF_3D-VOL: 35 %
BH CV ECHO MEAS - ESV(CUBED): 162.8 ML
BH CV ECHO MEAS - ESV(MOD-SP4): 157 ML
BH CV ECHO MEAS - FS: 10.3 %
BH CV ECHO MEAS - IVS/LVPW: 0.89 CM
BH CV ECHO MEAS - IVSD: 1.03 CM
BH CV ECHO MEAS - LA DIMENSION: 4.3 CM
BH CV ECHO MEAS - LAT PEAK E' VEL: 3.7 CM/SEC
BH CV ECHO MEAS - LV DIASTOLIC VOL/BSA (35-75): 119.1 CM2
BH CV ECHO MEAS - LV MASS(C)D: 285 GRAMS
BH CV ECHO MEAS - LV SYSTOLIC VOL/BSA (12-30): 77.9 CM2
BH CV ECHO MEAS - LVIDD: 6.1 CM
BH CV ECHO MEAS - LVIDS: 5.5 CM
BH CV ECHO MEAS - LVPWD: 1.16 CM
BH CV ECHO MEAS - MED PEAK E' VEL: 4.4 CM/SEC
BH CV ECHO MEAS - MR MAX PG: 84.4 MMHG
BH CV ECHO MEAS - MR MAX VEL: 459.4 CM/SEC
BH CV ECHO MEAS - MV A MAX VEL: 64 CM/SEC
BH CV ECHO MEAS - MV DEC SLOPE: 603.1 CM/SEC2
BH CV ECHO MEAS - MV E MAX VEL: 108 CM/SEC
BH CV ECHO MEAS - MV E/A: 1.69
BH CV ECHO MEAS - RAP SYSTOLE: 10 MMHG
BH CV ECHO MEAS - RVDD: 2.9 CM
BH CV ECHO MEAS - RVSP: 42.5 MMHG
BH CV ECHO MEAS - SI(MOD-SP4): 41.2 ML/M2
BH CV ECHO MEAS - SV(MOD-SP4): 83 ML
BH CV ECHO MEAS - TR MAX PG: 32.5 MMHG
BH CV ECHO MEAS - TR MAX VEL: 285 CM/SEC
BH CV ECHO MEASUREMENTS AVERAGE E/E' RATIO: 26.67
BH CV XLRA - RV BASE: 3.6 CM
BH CV XLRA - RV LENGTH: 8.2 CM
BH CV XLRA - RV MID: 2.8 CM
BILIRUB SERPL-MCNC: 0.5 MG/DL (ref 0–1.2)
BUN SERPL-MCNC: 16 MG/DL (ref 8–23)
BUN/CREAT SERPL: 17.4 (ref 7–25)
CALCIUM SPEC-SCNC: 9.4 MG/DL (ref 8.6–10.5)
CHLORIDE SERPL-SCNC: 102 MMOL/L (ref 98–107)
CHOLEST SERPL-MCNC: 188 MG/DL (ref 0–200)
CO2 SERPL-SCNC: 23.2 MMOL/L (ref 22–29)
CREAT SERPL-MCNC: 0.92 MG/DL (ref 0.76–1.27)
DEPRECATED RDW RBC AUTO: 45.9 FL (ref 37–54)
EGFRCR SERPLBLD CKD-EPI 2021: 90 ML/MIN/1.73
ERYTHROCYTE [DISTWIDTH] IN BLOOD BY AUTOMATED COUNT: 13.1 % (ref 12.3–15.4)
GLOBULIN UR ELPH-MCNC: 2.2 GM/DL
GLUCOSE SERPL-MCNC: 352 MG/DL (ref 65–99)
HBA1C MFR BLD: 11.2 % (ref 4.8–5.6)
HCT VFR BLD AUTO: 42.7 % (ref 37.5–51)
HDLC SERPL-MCNC: 42 MG/DL (ref 40–60)
HGB BLD-MCNC: 13.6 G/DL (ref 13–17.7)
LDLC SERPL CALC-MCNC: 115 MG/DL (ref 0–100)
LDLC/HDLC SERPL: 2.64 {RATIO}
LEFT ATRIUM VOLUME INDEX: 45.7 ML/M2
MAGNESIUM SERPL-MCNC: 2.4 MG/DL (ref 1.6–2.4)
MCH RBC QN AUTO: 30.6 PG (ref 26.6–33)
MCHC RBC AUTO-ENTMCNC: 31.9 G/DL (ref 31.5–35.7)
MCV RBC AUTO: 96 FL (ref 79–97)
PLATELET # BLD AUTO: 149 10*3/MM3 (ref 140–450)
PMV BLD AUTO: 11.4 FL (ref 6–12)
POTASSIUM SERPL-SCNC: 4.7 MMOL/L (ref 3.5–5.2)
PROT SERPL-MCNC: 6.6 G/DL (ref 6–8.5)
RBC # BLD AUTO: 4.45 10*6/MM3 (ref 4.14–5.8)
SODIUM SERPL-SCNC: 138 MMOL/L (ref 136–145)
TRIGL SERPL-MCNC: 176 MG/DL (ref 0–150)
TSH SERPL DL<=0.05 MIU/L-ACNC: 1.42 UIU/ML (ref 0.27–4.2)
VLDLC SERPL-MCNC: 31 MG/DL (ref 5–40)
WBC NRBC COR # BLD AUTO: 5.29 10*3/MM3 (ref 3.4–10.8)

## 2024-02-08 PROCEDURE — 85027 COMPLETE CBC AUTOMATED: CPT | Performed by: NURSE PRACTITIONER

## 2024-02-08 PROCEDURE — 83036 HEMOGLOBIN GLYCOSYLATED A1C: CPT | Performed by: NURSE PRACTITIONER

## 2024-02-08 PROCEDURE — 80053 COMPREHEN METABOLIC PANEL: CPT | Performed by: NURSE PRACTITIONER

## 2024-02-08 PROCEDURE — 80061 LIPID PANEL: CPT | Performed by: NURSE PRACTITIONER

## 2024-02-08 PROCEDURE — 83735 ASSAY OF MAGNESIUM: CPT | Performed by: NURSE PRACTITIONER

## 2024-02-08 PROCEDURE — 93306 TTE W/DOPPLER COMPLETE: CPT

## 2024-02-08 PROCEDURE — 84443 ASSAY THYROID STIM HORMONE: CPT | Performed by: NURSE PRACTITIONER

## 2024-02-09 DIAGNOSIS — I10 ESSENTIAL HYPERTENSION: ICD-10-CM

## 2024-02-09 DIAGNOSIS — R06.02 SHORTNESS OF BREATH: ICD-10-CM

## 2024-02-09 DIAGNOSIS — I25.9 IHD (ISCHEMIC HEART DISEASE): Primary | ICD-10-CM

## 2024-02-09 DIAGNOSIS — Z79.4 TYPE 2 DIABETES MELLITUS WITH OTHER CIRCULATORY COMPLICATION, WITH LONG-TERM CURRENT USE OF INSULIN: ICD-10-CM

## 2024-02-09 DIAGNOSIS — I10 ESSENTIAL HYPERTENSION: Primary | ICD-10-CM

## 2024-02-09 DIAGNOSIS — E78.00 HYPERCHOLESTEREMIA: ICD-10-CM

## 2024-02-09 DIAGNOSIS — E78.2 MIXED HYPERLIPIDEMIA: ICD-10-CM

## 2024-02-09 DIAGNOSIS — E11.59 TYPE 2 DIABETES MELLITUS WITH OTHER CIRCULATORY COMPLICATION, WITH LONG-TERM CURRENT USE OF INSULIN: ICD-10-CM

## 2024-02-09 DIAGNOSIS — R06.02 SHORTNESS OF BREATH: Primary | ICD-10-CM

## 2024-02-28 ENCOUNTER — TELEPHONE (OUTPATIENT)
Dept: CARDIOLOGY | Facility: CLINIC | Age: 69
End: 2024-02-28
Payer: MEDICARE

## 2024-02-29 RX ORDER — SPIRONOLACTONE 25 MG/1
25 TABLET ORAL DAILY
Qty: 30 TABLET | Refills: 6 | OUTPATIENT
Start: 2024-02-29

## 2024-03-11 ENCOUNTER — TELEPHONE (OUTPATIENT)
Dept: CARDIOLOGY | Facility: CLINIC | Age: 69
End: 2024-03-11
Payer: MEDICARE

## 2024-03-11 NOTE — TELEPHONE ENCOUNTER
Rich with [facility] left , he wanted to make sure patient is to be on Eliquis 5 mg BID and Brilinta 90 mg BID?

## 2024-03-12 NOTE — TELEPHONE ENCOUNTER
Rich at [facility] aware Brilinta is being decreased to 60 mg BID and continue Eliquis 5 mg BID and new Brilinta script will be sent.

## 2024-03-18 ENCOUNTER — TELEPHONE (OUTPATIENT)
Dept: CARDIOLOGY | Facility: CLINIC | Age: 69
End: 2024-03-18
Payer: MEDICARE

## 2024-03-18 NOTE — TELEPHONE ENCOUNTER
Rich with [facility] called.  Patient needs refill on his Farxiga.  The 2/9/24 script was only sent for 30 days with no refills.

## 2024-03-19 RX ORDER — DAPAGLIFLOZIN 10 MG/1
1 TABLET, FILM COATED ORAL DAILY
Qty: 90 TABLET | Refills: 3 | Status: SHIPPED | OUTPATIENT
Start: 2024-03-19 | End: 2024-03-20 | Stop reason: CLARIF

## 2024-03-20 ENCOUNTER — OFFICE VISIT (OUTPATIENT)
Dept: CARDIOLOGY | Facility: CLINIC | Age: 69
End: 2024-03-20
Payer: MEDICARE

## 2024-03-20 VITALS
SYSTOLIC BLOOD PRESSURE: 96 MMHG | HEART RATE: 76 BPM | BODY MASS INDEX: 27.11 KG/M2 | WEIGHT: 189.4 LBS | HEIGHT: 70 IN | DIASTOLIC BLOOD PRESSURE: 50 MMHG

## 2024-03-20 DIAGNOSIS — I48.0 PAF (PAROXYSMAL ATRIAL FIBRILLATION): ICD-10-CM

## 2024-03-20 DIAGNOSIS — R53.83 OTHER FATIGUE: ICD-10-CM

## 2024-03-20 DIAGNOSIS — Z79.4 TYPE 2 DIABETES MELLITUS WITH OTHER CIRCULATORY COMPLICATION, WITH LONG-TERM CURRENT USE OF INSULIN: ICD-10-CM

## 2024-03-20 DIAGNOSIS — I25.118 CORONARY ARTERY DISEASE WITH EXERTIONAL ANGINA: ICD-10-CM

## 2024-03-20 DIAGNOSIS — I20.0 UNSTABLE ANGINA: ICD-10-CM

## 2024-03-20 DIAGNOSIS — E11.59 TYPE 2 DIABETES MELLITUS WITH OTHER CIRCULATORY COMPLICATION, WITH LONG-TERM CURRENT USE OF INSULIN: ICD-10-CM

## 2024-03-20 DIAGNOSIS — I25.9 IHD (ISCHEMIC HEART DISEASE): Primary | ICD-10-CM

## 2024-03-20 DIAGNOSIS — R06.02 SHORTNESS OF BREATH: ICD-10-CM

## 2024-03-20 DIAGNOSIS — Z95.810 PRESENCE OF BIVENTRICULAR IMPLANTABLE CARDIOVERTER-DEFIBRILLATOR (ICD): ICD-10-CM

## 2024-03-20 PROCEDURE — 1160F RVW MEDS BY RX/DR IN RCRD: CPT | Performed by: NURSE PRACTITIONER

## 2024-03-20 PROCEDURE — 1159F MED LIST DOCD IN RCRD: CPT | Performed by: NURSE PRACTITIONER

## 2024-03-20 PROCEDURE — 99214 OFFICE O/P EST MOD 30 MIN: CPT | Performed by: NURSE PRACTITIONER

## 2024-03-20 RX ORDER — SPIRONOLACTONE 25 MG/1
25 TABLET ORAL DAILY
COMMUNITY

## 2024-03-20 RX ORDER — DAPAGLIFLOZIN 10 MG/1
10 TABLET, FILM COATED ORAL NIGHTLY
Start: 2024-03-20 | End: 2024-03-20

## 2024-03-20 RX ORDER — FENOFIBRATE 145 MG/1
145 TABLET, COATED ORAL DAILY
COMMUNITY

## 2024-03-20 RX ORDER — DAPAGLIFLOZIN 10 MG/1
10 TABLET, FILM COATED ORAL DAILY
Qty: 90 TABLET | Refills: 3 | Status: SHIPPED | OUTPATIENT
Start: 2024-03-20

## 2024-03-20 NOTE — PROGRESS NOTES
Chief Complaint   Patient presents with    Follow-up     Cardiac management    Pacemaker Check     St Cam device check 9-6-2023  Remote check 1-5-2024    LABS     February 2024 results on chart    Med Refill     Need refills on Entresto 90 day supply  to [facility]       Subjective       Adrian Samuel is a 68 y.o. male with HTN, hyperlipidemia, IDDM, complex cardiac history.  In the past he had PTCA, bypass, numerous cardiac catheterizations.  In 2012 BiV ICD placed.    On 1/11/19, Cath showed LVEF 30-35%, patent grafts with significantly depressed systolic function. Empiric therapy for ischemia and maxim therapy for cardiomyopathy recommended.  In July 2020 he underwent generator change out. In July 2021. Stress showed lateral wall ischemia with cath advised if problems persisted. EF remained similar to prior at 34%. Carotid US advised for dizziness, negative for flow limiting stenosis.     5/4/2023 echocardiogram: LVEF 35% with moderate global hypokinesis.  Repeat echocardiogram 2/8/2024 showed mild LVH with EF around 35%, mild to moderate MR, TR, and PA pressure 43 mmHg.  Overnight oxygen monitor was abnormal and supplemental oxygen prescribed.  He declined CPAP.    1/25/2024 remote device interrogation 75% atrial and 98% ventricular pacing.  Battery life 3 years and 6 months noted.    Today returns to the office for follow-up visit. He has chronic angina for which he uses as needed Nitrostat with benefit.  However, recently he has had to increase the use of his Nitrostat taking 2-3 before improvement of pain.  He admits to more shortness of breath.  He is concerned for worsening ischemia but declines cardiac catheterization.  He request repeat nuclear stress test to see if nuclear imaging shows worsening ischemia.  If so, then he will consider further evaluation if recommended.     Cardiac History:    Past Surgical History:   Procedure Laterality Date    CARDIAC CATHETERIZATION  1993    Cath-PTCA.    CARDIAC  CATHETERIZATION  02/08/2005    Cath-() Patent Grafts    CARDIAC CATHETERIZATION  08/01/2005    Cath-(Dr. Sigala) Patent Grafts.    CARDIAC CATHETERIZATION  09/18/2007    Cath-100%LAD, 80%D2. 100%LCX, RCA. Patent Lima & SVG to PDA.    CARDIAC CATHETERIZATION  08/22/2006    Cath-Patent Grafts    CARDIAC CATHETERIZATION  01/15/2010    Cath-Patent LIMA & SVG to PDA. 80% OM1-2.25 x 16mm Taxus Stent    CARDIAC CATHETERIZATION  02/14/2012    Cath-IVUS LAD 60-70%, 2.55 x 20mm Promus stent Ramus    CARDIAC CATHETERIZATION  03/12/2013    Cath-3.5 x 28Promus stent SVG to RCA.    CARDIAC CATHETERIZATION  11/25/2013    Cath-(Dr. Sigala) EF30-35%, occluded circ and graft but increase collaterol flow manage medically.    CARDIAC CATHETERIZATION  12/22/2014    Cath-85% SVG to PDA-    CARDIAC CATHETERIZATION  03/18/2015    Cath-90%LIMA to LAD-80% SVG to PDA-2.75 x 8 Resolute    CARDIAC CATHETERIZATION  05/29/2018    80% SVG to PDA- 3.5x30 Resolute Stent.    CARDIAC DEFIBRILLATOR PLACEMENT  2012    BiV/ICD, SJM    CARDIOVASCULAR STRESS TEST  02/19/2008    D.Myoview-75%THR. EF 43%. InferoLateral Infarct with Nba-Infarct Ischemia    CARDIOVASCULAR STRESS TEST  05/08/2009    D.Myoview-78%THR. EF48%. Inferiolateral wall infarct and nba-infarct ischemia.    CARDIOVASCULAR STRESS TEST  03/03/2010    L.Myoview-Lateral Ischemia    CARDIOVASCULAR STRESS TEST  02/13/2013    L.Myoview-lateral wall ischemia    CARDIOVASCULAR STRESS TEST  11/11/2013    L.Myoview-lateral wall ischemia    CARDIOVASCULAR STRESS TEST  11/18/2014    L.Myoview-([facility]) EF. Inferolateral Infarct    CARDIOVASCULAR STRESS TEST  03/17/2015    L.Myoview-Sig Anterior Changes    CARDIOVASCULAR STRESS TEST  03/15/2017    Lexiscan- lateral wall ischemia, nitrodur increased, cath if symptoms persist    CARDIOVASCULAR STRESS TEST  11/13/2018    L. Cardiolite- EF 34%. Inferolateral Infarct with periinfarct Ischemia.    CARDIOVASCULAR STRESS TEST  08/23/2021     Lexiscan- EF 34%. Lateral Ischemia    CONVERTED (HISTORICAL) CARDIOVASCULAR STUDIES  09/05/2012    MUGA-RVEF 41%, LVEF 38%    CONVERTED (HISTORICAL) HOLTER  06/11/2006    Holter-PVC's noted    CORONARY ARTERY BYPASS GRAFT  1995    CABG LIMA TO LAD, SVG TO OM and SVG to PDA.    ECHO - CONVERTED  08/29/2007    Echo-EF50-55%, mild to mod. MR, mildly hypokinetic septum.    ECHO - CONVERTED  05/08/2009    Echo-EF45-49%, Moderate MR and PA pressure-42 mmHg.    ECHO - CONVERTED  01/18/2012    Echo-EF 25-30%    ECHO - CONVERTED  05/17/2012    Echo-EF<30%. AnteroSpectal WMA.    ECHO - CONVERTED  09/02/2015    Echo-EF 30-35%, mod to severe MR    ECHO - CONVERTED  03/15/2017    EF 35-40%, RVSP 25-30 mmHg    ECHO - CONVERTED  11/13/2018    EF 35%. Inferolateral WMA. Mild- Mod MR. RVSP- 30 mmHg    ECHO - CONVERTED  08/23/2021    EF 35%. Lateral WMA. LA- 4.7 Cm. Trace MR & AI. RVSP- 28 mmHg    ECHO - CONVERTED  02/08/2024    Dil LV.EF 35%. Lateral WMA. LA- 4.3. Mild-Mod MR. RVSP- 43 mmHg    OTHER SURGICAL HISTORY  02/28/2014    U/S Aorta-    US CAROTID UNILATERAL  05/21/2015    Carotid US-no hemodyanamically significant stenosis    US CAROTID UNILATERAL  04/10/1989    Carotid US-No sig. stenosis noted       Current Outpatient Medications   Medication Sig Dispense Refill    apixaban (ELIQUIS) 5 MG tablet tablet Take 1 tablet by mouth 2 (Two) Times a Day. 60 tablet 8    atorvastatin (LIPITOR) 80 MG tablet TAKE 1 TABLET BY MOUTH DAILY. 90 tablet 3    baclofen (LIORESAL) 10 MG tablet Take 1 tablet by mouth Daily As Needed for Muscle Spasms.      carbidopa-levodopa (SINEMET)  MG per tablet Take 2 tablets by mouth 2 (Two) Times a Day.      cetirizine (ZyrTEC) 10 MG tablet Take 1 tablet by mouth Daily.      Cholecalciferol 50 MCG (2000 UT) tablet Take 1 tablet by mouth Daily.      citalopram (CeleXA) 40 MG tablet Take 1 tablet by mouth Daily.      cycloSPORINE (RESTASIS) 0.05 % ophthalmic emulsion TWICE DAILY      ezetimibe  "(ZETIA) 10 MG tablet TAKE 1 TABLET BY MOUTH EVERY DAY 90 tablet 3    fenofibrate (TRICOR) 145 MG tablet Take 1 tablet by mouth Daily.      HYDROcodone-acetaminophen (NORCO)  MG per tablet Take 1 tablet by mouth Every 6 (Six) Hours As Needed for Moderate Pain.      Insulin Aspart Prot & Aspart (NOVOLOG MIX 70/30 PENFILL SC) Inject  under the skin.      nebivolol (BYSTOLIC) 5 MG tablet TAKE 1 TABLET BY MOUTH EVERY DAY 90 tablet 3    nitroglycerin (NITROSTAT) 0.4 MG SL tablet PLACE 1 TABLET UNDER THE TONGUE EVERY 5 (FIVE) MINUTES AS NEEDED FOR CHEST PAIN. TAKE NO MORE THAN 3 DOSES IN 15 MINUTES. 25 tablet 3    O2 (OXYGEN) Inhale 2 L/min at night as needed.      ondansetron (ZOFRAN) 8 MG tablet Take 1 tablet by mouth 3 (Three) Times a Day As Needed for Nausea or Vomiting.      ONE TOUCH ULTRA TEST test strip   0    ONETOUCH DELICA LANCETS 33G misc   0    pantoprazole (PROTONIX) 40 MG EC tablet TAKE 1 TABLET BY MOUTH EVERY DAY 90 tablet 3    polyethylene glycol (MIRALAX) packet Take 17 g by mouth Daily As Needed.      sacubitril-valsartan (ENTRESTO) 24-26 MG tablet Take 1 tablet by mouth 2 (Two) Times a Day. 180 tablet 3    SITagliptin (JANUVIA) 100 MG tablet Take 1 tablet by mouth Daily.      spironolactone (ALDACTONE) 25 MG tablet Take 1 tablet by mouth Daily.      SURE COMFORT INSULIN SYRINGE 31G X 5/16\" 1 ML misc   6    ticagrelor (BRILINTA) 60 MG tablet tablet Take 1 tablet by mouth 2 (Two) Times a Day. 180 tablet 3    Trulicity 0.75 MG/0.5ML solution pen-injector Inject 0.75 mg under the skin into the appropriate area as directed Every 7 (Seven) Days.      Evolocumab (REPATHA) solution auto-injector SureClick injection Inject 1 mL under the skin into the appropriate area as directed Every 14 (Fourteen) Days. 2 mL 5    Farxiga 10 MG tablet Take 10 mg by mouth Daily. 90 tablet 3     No current facility-administered medications for this visit.       Effient [prasugrel], Imdur [isosorbide dinitrate], Livalo " [pitavastatin], and Ranolazine    Past Medical History:   Diagnosis Date    Disease of thyroid gland 04/10/2009    small colloid cyst right thyroid    Esophageal dilatation     Followed by Dr. Kyle    Heel spurs removed     History of left hip replacement 2023    Hypercholesterolemia     Hypertension     Systemic    IHD (ischemic heart disease)     Other abnormal cytological findings on specimens from anus 2006 & 2007    EECP    Pain     Chronic       Social History     Socioeconomic History    Marital status:    Tobacco Use    Smoking status: Former    Smokeless tobacco: Current     Types: Chew    Tobacco comments:     11/24/2015   Vaping Use    Vaping status: Never Used   Substance and Sexual Activity    Alcohol use: No    Drug use: Defer    Sexual activity: Defer       Family History   Problem Relation Age of Onset    Heart disease Mother     Stroke Mother     Diabetes Mother     Diabetes Father     Cirrhosis Father     Heart disease Other     Diabetes Other        Review of Systems   Constitutional: Positive for diaphoresis and malaise/fatigue. Negative for fever.   Cardiovascular:  Positive for chest pain, claudication, dyspnea on exertion and palpitations. Negative for leg swelling, near-syncope and syncope.   Respiratory:  Positive for shortness of breath.    Hematologic/Lymphatic: Negative for bleeding problem.   Musculoskeletal:  Negative for falls.   Gastrointestinal:  Negative for heartburn and nausea.   Neurological:  Positive for light-headedness. Negative for loss of balance.        BP Readings from Last 5 Encounters:   03/20/24 96/50   09/06/23 110/60   03/29/23 110/56   08/03/22 120/60   01/05/22 108/68       Wt Readings from Last 5 Encounters:   03/20/24 85.9 kg (189 lb 6.4 oz)   09/06/23 83.7 kg (184 lb 9.6 oz)   03/29/23 88.5 kg (195 lb)   08/03/22 91.4 kg (201 lb 9.6 oz)   01/05/22 90.4 kg (199 lb 3.2 oz)       Objective     Labs 2/8/2024: TSH 1.42, total cholesterol 188, triglycerides  "176, HDL 42, , magnesium 2.4, glucose 252, BUN 16, creatinine 0.92, sodium 138, potassium 4.7, chloride 102, CO2 23.2, calcium 9.4, total protein 6.6, Beman 4.4, ALT 14, AST 14, ALP 55, total bili 1.5, GFR 90, magnesium 2.4: CBC in normal range    BP 96/50 (BP Location: Left arm, Patient Position: Sitting)   Pulse 76   Ht 177.8 cm (70\")   Wt 85.9 kg (189 lb 6.4 oz)   BMI 27.18 kg/m²     Vitals and nursing note reviewed.   Constitutional:       Appearance: Healthy appearance. Not in distress.   Eyes:      Conjunctiva/sclera: Conjunctivae normal.      Pupils: Pupils are equal, round, and reactive to light.   HENT:      Head: Normocephalic.   Pulmonary:      Effort: Pulmonary effort is normal.      Breath sounds: Normal breath sounds.   Cardiovascular:      PMI at left midclavicular line. Normal rate. Regular rhythm. loud S2.       Murmurs: There is a systolic murmur.   Edema:     Peripheral edema absent.   Abdominal:      General: Bowel sounds are normal.      Palpations: Abdomen is soft.   Musculoskeletal: Normal range of motion.      Cervical back: Normal range of motion and neck supple. Skin:     General: Skin is warm and dry.   Neurological:      Mental Status: Alert, oriented to person, place, and time and oriented to person, place and time.      Coordination: Coordination is intact.   Psychiatric:         Mood and Affect: Mood normal.         Speech: Speech normal.         Behavior: Behavior normal.         Cognition and Memory: Cognition and memory normal.            ECG 12 Lead    Date/Time: 3/21/2024 6:13 PM  Performed by: Claudia Cerda APRN    Authorized by: Claudia Cerda APRN  Comparison: compared with previous ECG from 2024  Similar to previous ECG  Comparison to previous ECG: AV paced  Rhythm: paced  BPM: 76  Pacin% capture             Assessment & Plan   Diagnoses and all orders for this visit:    1. IHD (ischemic heart disease) (Primary)  -     Stress Test With Myocardial " Perfusion One Day; Future    2. Unstable angina  -     Stress Test With Myocardial Perfusion One Day; Future    3. Type 2 diabetes mellitus with other circulatory complication, with long-term current use of insulin  -     Stress Test With Myocardial Perfusion One Day; Future    4. Shortness of breath  -     Stress Test With Myocardial Perfusion One Day; Future    5. Coronary artery disease with exertional angina  -     Stress Test With Myocardial Perfusion One Day; Future    6. Other fatigue  -     Stress Test With Myocardial Perfusion One Day; Future    7. PAF (paroxysmal atrial fibrillation)  -     ECG 12 Lead    8. Presence of biventricular implantable cardioverter-defibrillator (ICD)  -     ECG 12 Lead    Other orders  -     Discontinue: dapagliflozin Propanediol (Farxiga) 10 MG tablet; Take 10 mg by mouth Every Night.      IHD  -Stress test 2019 small area of ischemia  -Admits to exertional angina, now having to take nitrostat more often and up to 3 tablets prior to relief.   -Continue Brilinta at 60 mg dosing, aspirin, Lipitor  -Continue as needed Nitrostat  -Patient requested proceed with nuclear stress test      Cardiomegaly/HFrEF  -May 2023 and repeat January 2024 echocardiogram LVEF stable at 35%  -Continue Entresto, Bystolic, Farxiga  -Patient taking Farxiga at night due to side effects.     BiV ICD/PAF  - Recent interrogation stable  -AMS less than 1%  -Continue Eliquis, bleeding denied     Hypercholesterolemia  -Continue Lipitor, Zetia  -now following with lipid clinic, plans to start PCSK9 inhibitor this week.      6-month follow-up visit scheduled.    Further recommendations based on cardiac test results                 Electronically signed by JONH Nolan,  March 22, 2024 08:25 EDT    Dictated Utilizing Dragon Dictation: Part of this note may be an electronic transcription/translation of spoken language to printed text using the Dragon Dictation System.

## 2024-03-21 PROCEDURE — 93000 ELECTROCARDIOGRAM COMPLETE: CPT | Performed by: NURSE PRACTITIONER

## 2024-03-22 ENCOUNTER — HOSPITAL ENCOUNTER (OUTPATIENT)
Dept: CARDIOLOGY | Facility: HOSPITAL | Age: 69
Discharge: HOME | End: 2024-03-22
Payer: MEDICARE

## 2024-03-22 ENCOUNTER — SPECIALTY PHARMACY (OUTPATIENT)
Dept: PHARMACY | Facility: HOSPITAL | Age: 69
End: 2024-03-22
Payer: MEDICARE

## 2024-03-22 DIAGNOSIS — I25.110 CORONARY ARTERY DISEASE INVOLVING NATIVE CORONARY ARTERY OF NATIVE HEART WITH UNSTABLE ANGINA PECTORIS: Primary | ICD-10-CM

## 2024-03-22 RX ORDER — INSULIN GLARGINE 300 U/ML
INJECTION, SOLUTION SUBCUTANEOUS
COMMUNITY

## 2024-03-22 NOTE — PROGRESS NOTES
[facility]  Lipid Management Program - PCSK9i       Adrian Samuel is a 68 y.o. male referred to the [facility] by Claudia Cerda for clinical pharmacy and specialty pharmacy management of PCSK9i.  Adrian Samuel is  treated for ASCVD and currently takes atorvastatin, fenofibrate and zetia, yet LDL is not at goal. The patient denies any allergies to latex.      Relevant Past Medical History and Comorbidities  Past Medical History:   Diagnosis Date    Disease of thyroid gland 04/10/2009    small colloid cyst right thyroid    Esophageal dilatation     Followed by Dr. Kyle    Heel spurs removed     History of left hip replacement 2023    Hypercholesterolemia     Hypertension     Systemic    IHD (ischemic heart disease)     Other abnormal cytological findings on specimens from anus 2006 & 2007    EECP    Pain     Chronic     Social History     Socioeconomic History    Marital status:    Tobacco Use    Smoking status: Former    Smokeless tobacco: Current     Types: Chew    Tobacco comments:     11/24/2015   Vaping Use    Vaping status: Never Used   Substance and Sexual Activity    Alcohol use: No    Drug use: Defer    Sexual activity: Defer       Allergies  Effient [prasugrel], Imdur [isosorbide dinitrate], Livalo [pitavastatin], and Ranolazine    Current Medication List    Current Outpatient Medications:     apixaban (ELIQUIS) 5 MG tablet tablet, Take 1 tablet by mouth 2 (Two) Times a Day., Disp: 60 tablet, Rfl: 8    atorvastatin (LIPITOR) 80 MG tablet, TAKE 1 TABLET BY MOUTH DAILY., Disp: 90 tablet, Rfl: 3    baclofen (LIORESAL) 10 MG tablet, Take 1 tablet by mouth 2 (Two) Times a Day., Disp: , Rfl:     carbidopa-levodopa (SINEMET)  MG per tablet, Take 1 tablet by mouth 2 (Two) Times a Day., Disp: , Rfl:     cetirizine (ZyrTEC) 10 MG tablet, Take 1 tablet by mouth Daily., Disp: , Rfl:     citalopram (CeleXA) 40 MG tablet, Take 1 tablet by mouth Daily., Disp: , Rfl:      cycloSPORINE (RESTASIS) 0.05 % ophthalmic emulsion, TWICE DAILY, Disp: , Rfl:     Evolocumab (REPATHA) solution auto-injector SureClick injection, Inject 1 mL under the skin into the appropriate area as directed Every 14 (Fourteen) Days., Disp: 2 mL, Rfl: 5    ezetimibe (ZETIA) 10 MG tablet, TAKE 1 TABLET BY MOUTH EVERY DAY, Disp: 90 tablet, Rfl: 3    Farxiga 10 MG tablet, Take 10 mg by mouth Daily., Disp: 90 tablet, Rfl: 3    fenofibrate (TRICOR) 145 MG tablet, Take 1 tablet by mouth Daily., Disp: , Rfl:     HYDROcodone-acetaminophen (NORCO)  MG per tablet, Take 1 tablet by mouth Every 6 (Six) Hours As Needed for Moderate Pain., Disp: , Rfl:     Insulin Aspart Prot & Aspart (NOVOLOG MIX 70/30 PENFILL SC), Inject 65 Units under the skin into the appropriate area as directed 2 (Two) Times a Day With Meals., Disp: , Rfl:     nebivolol (BYSTOLIC) 5 MG tablet, TAKE 1 TABLET BY MOUTH EVERY DAY, Disp: 90 tablet, Rfl: 3    nitroglycerin (NITROSTAT) 0.4 MG SL tablet, PLACE 1 TABLET UNDER THE TONGUE EVERY 5 (FIVE) MINUTES AS NEEDED FOR CHEST PAIN. TAKE NO MORE THAN 3 DOSES IN 15 MINUTES., Disp: 25 tablet, Rfl: 3    O2 (OXYGEN), Inhale 2 L/min at night as needed., Disp: , Rfl:     ondansetron (ZOFRAN) 8 MG tablet, Take 1 tablet by mouth 3 (Three) Times a Day As Needed for Nausea or Vomiting., Disp: , Rfl:     ONE TOUCH ULTRA TEST test strip, , Disp: , Rfl: 0    ONETOUCH DELICA LANCETS 33G misc, , Disp: , Rfl: 0    pantoprazole (PROTONIX) 40 MG EC tablet, TAKE 1 TABLET BY MOUTH EVERY DAY, Disp: 90 tablet, Rfl: 3    polyethylene glycol (MIRALAX) packet, Take 17 g by mouth Daily As Needed., Disp: , Rfl:     sacubitril-valsartan (ENTRESTO) 24-26 MG tablet, Take 1 tablet by mouth 2 (Two) Times a Day., Disp: 180 tablet, Rfl: 3    SITagliptin (JANUVIA) 100 MG tablet, Take 1 tablet by mouth Daily., Disp: , Rfl:     spironolactone (ALDACTONE) 25 MG tablet, Take 1 tablet by mouth Daily., Disp: , Rfl:     SURE COMFORT INSULIN  "SYRINGE 31G X 5/16\" 1 ML misc, , Disp: , Rfl: 6    ticagrelor (BRILINTA) 60 MG tablet tablet, Take 1 tablet by mouth 2 (Two) Times a Day., Disp: 180 tablet, Rfl: 3    Cholecalciferol 50 MCG (2000 UT) tablet, Take 1 tablet by mouth Daily. (Patient not taking: Reported on 3/22/2024), Disp: , Rfl:     Insulin Glargine, 1 Unit Dial, (Toujeo SoloStar) 300 UNIT/ML solution pen-injector injection, Inject  under the skin into the appropriate area as directed. (Patient not taking: Reported on 3/22/2024), Disp: , Rfl:     Drug Interactions  None with Repatha    Relevant Laboratory Values  Lab Results   Component Value Date    CHOL 188 02/08/2024    TRIG 176 (H) 02/08/2024    HDL 42 02/08/2024     (H) 02/08/2024       Medication Assessment & Plan    Patient started today on Repatha 140mg every 2 weeks. Injection training and medication education provided.     Iona Hurley, PharmD Candidate administered in back of right arm.     Patient will need lipid panel in 6 weeks, order placed.     Patient will continue regular follow-up with cardiology.     Will follow up with specialty pharmacy for next injection    Will follow-up in 6 months, or sooner if needed.     Mariah Dobson PharmD  3/22/2024  11:53 EDT                    "

## 2024-04-08 ENCOUNTER — HOSPITAL ENCOUNTER (OUTPATIENT)
Dept: CARDIOLOGY | Facility: HOSPITAL | Age: 69
Discharge: HOME | End: 2024-04-08
Payer: MEDICARE

## 2024-04-08 ENCOUNTER — LAB (OUTPATIENT)
Dept: LAB | Facility: HOSPITAL | Age: 69
End: 2024-04-08
Payer: MEDICARE

## 2024-04-08 DIAGNOSIS — Z79.4 TYPE 2 DIABETES MELLITUS WITH OTHER CIRCULATORY COMPLICATION, WITH LONG-TERM CURRENT USE OF INSULIN: ICD-10-CM

## 2024-04-08 DIAGNOSIS — R06.02 SHORTNESS OF BREATH: ICD-10-CM

## 2024-04-08 DIAGNOSIS — I20.0 UNSTABLE ANGINA: ICD-10-CM

## 2024-04-08 DIAGNOSIS — R53.83 OTHER FATIGUE: ICD-10-CM

## 2024-04-08 DIAGNOSIS — I25.110 CORONARY ARTERY DISEASE INVOLVING NATIVE CORONARY ARTERY OF NATIVE HEART WITH UNSTABLE ANGINA PECTORIS: ICD-10-CM

## 2024-04-08 DIAGNOSIS — E11.59 TYPE 2 DIABETES MELLITUS WITH OTHER CIRCULATORY COMPLICATION, WITH LONG-TERM CURRENT USE OF INSULIN: ICD-10-CM

## 2024-04-08 DIAGNOSIS — I25.118 CORONARY ARTERY DISEASE WITH EXERTIONAL ANGINA: ICD-10-CM

## 2024-04-08 DIAGNOSIS — I25.9 IHD (ISCHEMIC HEART DISEASE): ICD-10-CM

## 2024-04-08 LAB
BH CV REST NUCLEAR ISOTOPE DOSE: 10 MCI
BH CV STRESS COMMENTS STAGE 1: NORMAL
BH CV STRESS DOSE REGADENOSON STAGE 1: 0.4
BH CV STRESS DURATION MIN STAGE 1: 0
BH CV STRESS DURATION SEC STAGE 1: 10
BH CV STRESS NUCLEAR ISOTOPE DOSE: 30 MCI
BH CV STRESS PROTOCOL 1: NORMAL
BH CV STRESS RECOVERY BP: NORMAL MMHG
BH CV STRESS RECOVERY HR: 70 BPM
BH CV STRESS STAGE 1: 1
CHOLEST SERPL-MCNC: 93 MG/DL (ref 0–200)
HDLC SERPL-MCNC: 55 MG/DL (ref 40–60)
LDLC SERPL CALC-MCNC: 22 MG/DL (ref 0–100)
LDLC/HDLC SERPL: 0.41 {RATIO}
LV EF NUC BP: 25 %
MAXIMAL PREDICTED HEART RATE: 151 BPM
PERCENT MAX PREDICTED HR: 49.67 %
STRESS BASELINE BP: NORMAL MMHG
STRESS BASELINE HR: 73 BPM
STRESS PERCENT HR: 58 %
STRESS POST PEAK BP: NORMAL MMHG
STRESS POST PEAK HR: 75 BPM
STRESS TARGET HR: 128 BPM
TRIGL SERPL-MCNC: 76 MG/DL (ref 0–150)
VLDLC SERPL-MCNC: 16 MG/DL (ref 5–40)

## 2024-04-08 PROCEDURE — 78452 HT MUSCLE IMAGE SPECT MULT: CPT

## 2024-04-08 PROCEDURE — 93017 CV STRESS TEST TRACING ONLY: CPT

## 2024-04-08 PROCEDURE — 93018 CV STRESS TEST I&R ONLY: CPT | Performed by: INTERNAL MEDICINE

## 2024-04-08 PROCEDURE — 0 TECHNETIUM SESTAMIBI: Performed by: INTERNAL MEDICINE

## 2024-04-08 PROCEDURE — 36415 COLL VENOUS BLD VENIPUNCTURE: CPT

## 2024-04-08 PROCEDURE — 25010000002 REGADENOSON 0.4 MG/5ML SOLUTION: Performed by: INTERNAL MEDICINE

## 2024-04-08 PROCEDURE — A9500 TC99M SESTAMIBI: HCPCS | Performed by: INTERNAL MEDICINE

## 2024-04-08 PROCEDURE — 78452 HT MUSCLE IMAGE SPECT MULT: CPT | Performed by: INTERNAL MEDICINE

## 2024-04-08 PROCEDURE — 80061 LIPID PANEL: CPT

## 2024-04-08 RX ORDER — REGADENOSON 0.08 MG/ML
0.4 INJECTION, SOLUTION INTRAVENOUS
Status: COMPLETED | OUTPATIENT
Start: 2024-04-08 | End: 2024-04-08

## 2024-04-08 RX ADMIN — TECHNETIUM TC 99M SESTAMIBI 1 DOSE: 1 INJECTION INTRAVENOUS at 13:29

## 2024-04-08 RX ADMIN — REGADENOSON 0.4 MG: 0.08 INJECTION, SOLUTION INTRAVENOUS at 13:29

## 2024-04-08 RX ADMIN — TECHNETIUM TC 99M SESTAMIBI 1 DOSE: 1 INJECTION INTRAVENOUS at 12:05

## 2024-04-15 ENCOUNTER — SPECIALTY PHARMACY (OUTPATIENT)
Dept: PHARMACY | Facility: HOSPITAL | Age: 69
End: 2024-04-15
Payer: MEDICARE

## 2024-04-15 NOTE — PROGRESS NOTES
Specialty Pharmacy Refill Coordination Note     Adrian is a 68 y.o. male contacted today regarding refills of  Repatha SureClick specialty medication(s).    Reviewed and verified with patient:       Specialty medication(s) and dose(s) confirmed: yes    Refill Questions      Flowsheet Row Most Recent Value   Changes to allergies? No   Changes to medications? No   New conditions or infections since last clinic visit No   Unplanned office visit, urgent care, ED, or hospital admission in the last 4 weeks  No   How does patient/caregiver feel medication is working? Very good   Financial problems or insurance changes  No   Since the previous refill, were any specialty medication doses or scheduled injections missed or delayed?  No   Does this patient require a clinical escalation to a pharmacist? No            Delivery Questions      Flowsheet Row Most Recent Value   Copay verified? Yes   Copay amount 0   Copay form of payment No copayment ($0)                   Follow-up: 28 day(s)     Isis Mitchell, Pharmacy Technician  Specialty Pharmacy Technician

## 2024-05-04 DIAGNOSIS — I50.30 CONGESTIVE HEART FAILURE WITH LV DIASTOLIC DYSFUNCTION, NYHA CLASS 2: ICD-10-CM

## 2024-05-08 RX ORDER — FENOFIBRATE 145 MG/1
145 TABLET, COATED ORAL DAILY
Qty: 90 TABLET | Refills: 3 | OUTPATIENT
Start: 2024-05-08

## 2024-05-08 RX ORDER — SACUBITRIL AND VALSARTAN 24; 26 MG/1; MG/1
1 TABLET, FILM COATED ORAL 2 TIMES DAILY
Qty: 180 TABLET | Refills: 1 | Status: SHIPPED | OUTPATIENT
Start: 2024-05-08

## 2024-05-09 ENCOUNTER — SPECIALTY PHARMACY (OUTPATIENT)
Dept: PHARMACY | Facility: HOSPITAL | Age: 69
End: 2024-05-09
Payer: MEDICARE

## 2024-05-09 NOTE — PROGRESS NOTES
Specialty Pharmacy Refill Coordination Note     Adrian is a 68 y.o. male contacted today regarding refills of  Repatha SureClick specialty medication(s).    Reviewed and verified with patient:       Specialty medication(s) and dose(s) confirmed: yes    Refill Questions      Flowsheet Row Most Recent Value   Changes to allergies? No   Changes to medications? No   New conditions or infections since last clinic visit No   Unplanned office visit, urgent care, ED, or hospital admission in the last 4 weeks  No   How does patient/caregiver feel medication is working? Very good   Financial problems or insurance changes  No   Since the previous refill, were any specialty medication doses or scheduled injections missed or delayed?  No   Does this patient require a clinical escalation to a pharmacist? No            Delivery Questions      Flowsheet Row Most Recent Value   Copay verified? Yes   Copay amount 0   Copay form of payment No copayment ($0)                   Follow-up: 84 day(s)     Isis Mitchell, Pharmacy Technician  Specialty Pharmacy Technician

## 2024-08-02 DIAGNOSIS — I25.9 IHD (ISCHEMIC HEART DISEASE): ICD-10-CM

## 2024-08-05 RX ORDER — NITROGLYCERIN 0.4 MG/1
0.4 TABLET SUBLINGUAL
Qty: 25 TABLET | Refills: 3 | Status: SHIPPED | OUTPATIENT
Start: 2024-08-05

## 2024-08-06 ENCOUNTER — TELEPHONE (OUTPATIENT)
Dept: PHARMACY | Facility: HOSPITAL | Age: 69
End: 2024-08-06
Payer: MEDICARE

## 2024-08-06 NOTE — TELEPHONE ENCOUNTER
"Contacted patient about sending Repatha Refill and the patient informed me that he is not going to be taking cholesterol injections any longer. He stated \"he can not take it due to it causing him all kinds of issues.\" I did let him know that I would inform the referring provider and disenroll him from out program.   "

## 2024-10-02 ENCOUNTER — OFFICE VISIT (OUTPATIENT)
Dept: CARDIOLOGY | Facility: CLINIC | Age: 69
End: 2024-10-02
Payer: MEDICARE

## 2024-10-02 VITALS
SYSTOLIC BLOOD PRESSURE: 96 MMHG | WEIGHT: 193 LBS | HEIGHT: 70 IN | BODY MASS INDEX: 27.63 KG/M2 | HEART RATE: 76 BPM | DIASTOLIC BLOOD PRESSURE: 58 MMHG

## 2024-10-02 DIAGNOSIS — I48.0 PAF (PAROXYSMAL ATRIAL FIBRILLATION): ICD-10-CM

## 2024-10-02 DIAGNOSIS — E78.00 HYPERCHOLESTEROLEMIA: ICD-10-CM

## 2024-10-02 DIAGNOSIS — I50.30 CONGESTIVE HEART FAILURE WITH LV DIASTOLIC DYSFUNCTION, NYHA CLASS 2: Primary | ICD-10-CM

## 2024-10-02 DIAGNOSIS — R06.02 SHORTNESS OF BREATH: ICD-10-CM

## 2024-10-02 DIAGNOSIS — I10 ESSENTIAL HYPERTENSION: ICD-10-CM

## 2024-10-02 DIAGNOSIS — Z79.4 TYPE 2 DIABETES MELLITUS WITHOUT COMPLICATION, WITH LONG-TERM CURRENT USE OF INSULIN: ICD-10-CM

## 2024-10-02 DIAGNOSIS — R07.89 CHEST PRESSURE: ICD-10-CM

## 2024-10-02 DIAGNOSIS — I25.9 IHD (ISCHEMIC HEART DISEASE): ICD-10-CM

## 2024-10-02 DIAGNOSIS — R06.02 SHORTNESS OF BREATH: Primary | ICD-10-CM

## 2024-10-02 DIAGNOSIS — E11.9 TYPE 2 DIABETES MELLITUS WITHOUT COMPLICATION, WITH LONG-TERM CURRENT USE OF INSULIN: ICD-10-CM

## 2024-10-02 DIAGNOSIS — I25.5 ISCHEMIC CARDIOMYOPATHY: ICD-10-CM

## 2024-10-02 DIAGNOSIS — I50.30 CONGESTIVE HEART FAILURE WITH LV DIASTOLIC DYSFUNCTION, NYHA CLASS 2: ICD-10-CM

## 2024-10-02 DIAGNOSIS — Z79.4 TYPE 2 DIABETES MELLITUS WITH OTHER CIRCULATORY COMPLICATION, WITH LONG-TERM CURRENT USE OF INSULIN: ICD-10-CM

## 2024-10-02 DIAGNOSIS — E11.59 TYPE 2 DIABETES MELLITUS WITH OTHER CIRCULATORY COMPLICATION, WITH LONG-TERM CURRENT USE OF INSULIN: ICD-10-CM

## 2024-10-02 PROBLEM — Z79.899 MEDICATION MANAGEMENT: Status: RESOLVED | Noted: 2017-03-01 | Resolved: 2024-10-02

## 2024-10-02 PROBLEM — I25.110 CORONARY ARTERY DISEASE INVOLVING NATIVE CORONARY ARTERY OF NATIVE HEART WITH UNSTABLE ANGINA PECTORIS: Status: RESOLVED | Noted: 2017-03-01 | Resolved: 2024-10-02

## 2024-10-02 PROCEDURE — 1160F RVW MEDS BY RX/DR IN RCRD: CPT | Performed by: INTERNAL MEDICINE

## 2024-10-02 PROCEDURE — 93000 ELECTROCARDIOGRAM COMPLETE: CPT | Performed by: INTERNAL MEDICINE

## 2024-10-02 PROCEDURE — 93289 INTERROG DEVICE EVAL HEART: CPT | Performed by: INTERNAL MEDICINE

## 2024-10-02 PROCEDURE — 3074F SYST BP LT 130 MM HG: CPT | Performed by: INTERNAL MEDICINE

## 2024-10-02 PROCEDURE — 99214 OFFICE O/P EST MOD 30 MIN: CPT | Performed by: INTERNAL MEDICINE

## 2024-10-02 PROCEDURE — 3078F DIAST BP <80 MM HG: CPT | Performed by: INTERNAL MEDICINE

## 2024-10-02 PROCEDURE — 1159F MED LIST DOCD IN RCRD: CPT | Performed by: INTERNAL MEDICINE

## 2024-10-02 RX ORDER — DICYCLOMINE HYDROCHLORIDE 10 MG/1
10 CAPSULE ORAL 2 TIMES DAILY WITH MEALS
Qty: 60 CAPSULE | Refills: 3 | Status: SHIPPED | OUTPATIENT
Start: 2024-10-02

## 2024-10-02 RX ORDER — NEBIVOLOL 5 MG/1
5 TABLET ORAL DAILY
Qty: 90 TABLET | Refills: 3 | Status: SHIPPED | OUTPATIENT
Start: 2024-10-02

## 2024-10-02 RX ORDER — SPIRONOLACTONE 25 MG/1
25 TABLET ORAL DAILY
Qty: 90 TABLET | Refills: 3 | Status: SHIPPED | OUTPATIENT
Start: 2024-10-02

## 2024-10-02 RX ORDER — SACUBITRIL AND VALSARTAN 24; 26 MG/1; MG/1
1 TABLET, FILM COATED ORAL 2 TIMES DAILY
Qty: 180 TABLET | Refills: 1 | Status: SHIPPED | OUTPATIENT
Start: 2024-10-02

## 2024-10-02 RX ORDER — EZETIMIBE 10 MG/1
10 TABLET ORAL DAILY
Qty: 90 TABLET | Refills: 3 | Status: SHIPPED | OUTPATIENT
Start: 2024-10-02

## 2024-10-02 RX ORDER — FENOFIBRATE 145 MG/1
145 TABLET, COATED ORAL DAILY
Qty: 90 TABLET | Refills: 3 | Status: SHIPPED | OUTPATIENT
Start: 2024-10-02

## 2024-10-02 RX ORDER — ATORVASTATIN CALCIUM 80 MG/1
80 TABLET, FILM COATED ORAL DAILY
Qty: 90 TABLET | Refills: 3 | Status: SHIPPED | OUTPATIENT
Start: 2024-10-02

## 2024-10-02 RX ORDER — LANSOPRAZOLE 30 MG/1
30 CAPSULE, DELAYED RELEASE ORAL DAILY
Qty: 90 CAPSULE | Refills: 3 | Status: SHIPPED | OUTPATIENT
Start: 2024-10-02

## 2024-10-02 RX ORDER — DAPAGLIFLOZIN 10 MG/1
10 TABLET, FILM COATED ORAL DAILY
Qty: 90 TABLET | Refills: 3 | Status: SHIPPED | OUTPATIENT
Start: 2024-10-02

## 2024-10-02 RX ORDER — NITROGLYCERIN 0.4 MG/1
0.4 TABLET SUBLINGUAL
Qty: 25 TABLET | Refills: 3 | Status: SHIPPED | OUTPATIENT
Start: 2024-10-02

## 2024-10-02 NOTE — PROGRESS NOTES
"Chief Complaint   Patient presents with   • Follow-up     For cardiac management, SOB noted.    • Medication Problem     Pt didn't bring medication or list. He states PCP has not changed any medication that he is aware of.   Pt said the only change was he quit the Repatha ( see telephone encounter 8/6/24), states he \"had a lot kidney problems/ just didn't feel good\", reports doing better since stopping it.    • Pacemaker Check     St russell check today in office, report in the door.  Last remote monitoring 7/21/24.    • labs     Lipids in chart from April while still on Repatha. PCP checked labs about a month ago.    • Shortness of Breath     Seems to be getting progressively worse.    • Med Refill     Refills needed on cardiac meds. 90 days to F&H       CARDIAC COMPLAINTS  chest pressure/discomfort, dyspnea, and fatigue        Subjective   Adrian Samuel is a 69 y.o. male came today for his regular cardiac follow-up.  He has history of diabetes, hypertension, hypercholesterolemia who has a very complex cardiac history.  He was first diagnosed with ischemic heart disease in 93 when he underwent PTCA.  He had a lot of restenosis and underwent bypass surgery 95 with LIMA to LAD SVG to OM and SVG to PDA.  Since then he has undergone multiple cardiac catheterization.  His vein graft to OM failure and underwent stenting of the OM in 2010.  He then noted to have significant drop in the EF and had a BiV ICD placed.  After which he underwent stenting of the LAD, stenting of the vein graft to RCA multiple times.  The last cardiac catheterization was in 2018 when he underwent stenting of the vein graft to PDA.  His EF did show some mild improvement but dropped again.  In 2021 he had lateral wall ischemia but he wanted to try medical management.  In 2024 his stress test showed drop in the EF with a lateral infarct.  He also is on maximum medication for cholesterol including Zetia, Lipitor 80 and Tricor.  He is lipid panel in " April showed LDL did come down with the addition of Repatha.  From 115 it came down to 22.  Few months later she stopped taking Repatha because it was causing some kind of kidney problem.  He also noted to have multiple short runs of PAF by the remote monitoring and so NOAC  was added.  He came today stating that he is still noticing shortness of breath is still having chest pain he takes multiple nitroglycerin.  His daughter also called him on the phone and I talked to her about him also.  One of her major problem I explained to her is the blood sugar.  His A1c was 11.20.  I also talked to him about repeating the cardiac cath if he has more chest pain.              Cardiac History  Past Surgical History:   Procedure Laterality Date   • BIVENTRICULLAR IMPLANTABLE CARDIOVERTER DEFIBRILLATOR PLACEMENT  07/06/2020    St. Cam replacement by Dr. Fuentes   • CARDIAC CATHETERIZATION  1993    Cath-PTCA.   • CARDIAC CATHETERIZATION  02/08/2005    Cath-() Patent Grafts   • CARDIAC CATHETERIZATION  08/01/2005    Cath-(Dr. Sigala) Patent Grafts.   • CARDIAC CATHETERIZATION  09/18/2007    Cath-100%LAD, 80%D2. 100%LCX, RCA. Patent Lima & SVG to PDA.   • CARDIAC CATHETERIZATION  08/22/2006    Cath-Patent Grafts   • CARDIAC CATHETERIZATION  01/15/2010    Cath-Patent LIMA & SVG to PDA. 80% OM1-2.25 x 16mm Taxus Stent   • CARDIAC CATHETERIZATION  02/14/2012    Cath-IVUS LAD 60-70%, 2.55 x 20mm Promus stent Ramus   • CARDIAC CATHETERIZATION  03/12/2013    Cath-3.5 x 28Promus stent SVG to RCA.   • CARDIAC CATHETERIZATION  11/25/2013    Cath-(Dr. Sigala) EF30-35%, occluded circ and graft but increase collaterol flow manage medically.   • CARDIAC CATHETERIZATION  12/22/2014    Cath-85% SVG to PDA-   • CARDIAC CATHETERIZATION  03/18/2015    Cath-90%LIMA to LAD-80% SVG to PDA-2.75 x 8 Resolute   • CARDIAC CATHETERIZATION  05/29/2018    80% SVG to PDA- 3.5x30 Resolute Stent.   • CARDIAC DEFIBRILLATOR PLACEMENT  2012     BiV/ICD, SJM   • CARDIOVASCULAR STRESS TEST  02/19/2008    D.Myoview-75%THR. EF 43%. InferoLateral Infarct with Nba-Infarct Ischemia   • CARDIOVASCULAR STRESS TEST  05/08/2009    D.Myoview-78%THR. EF48%. Inferiolateral wall infarct and nba-infarct ischemia.   • CARDIOVASCULAR STRESS TEST  03/03/2010    L.Myoview-Lateral Ischemia   • CARDIOVASCULAR STRESS TEST  02/13/2013    L.Myoview-lateral wall ischemia   • CARDIOVASCULAR STRESS TEST  11/11/2013    L.Myoview-lateral wall ischemia   • CARDIOVASCULAR STRESS TEST  11/18/2014    L.Myoview-([facility]) EF. Inferolateral Infarct   • CARDIOVASCULAR STRESS TEST  03/17/2015    L.Myoview-Sig Anterior Changes   • CARDIOVASCULAR STRESS TEST  03/15/2017    Lexiscan- lateral wall ischemia, nitrodur increased, cath if symptoms persist   • CARDIOVASCULAR STRESS TEST  11/13/2018    L. Cardiolite- EF 34%. Inferolateral Infarct with periinfarct Ischemia.   • CARDIOVASCULAR STRESS TEST  08/23/2021    Lexiscan- EF 34%. Lateral Ischemia   • CARDIOVASCULAR STRESS TEST  04/08/2024    Lexiscan- EF 25%. Lateral Infarct   • CONVERTED (HISTORICAL) CARDIOVASCULAR STUDIES  09/05/2012    MUGA-RVEF 41%, LVEF 38%   • CONVERTED (HISTORICAL) HOLTER  06/11/2006    Holter-PVC's noted   • CORONARY ARTERY BYPASS GRAFT  1995    CABG LIMA TO LAD, SVG TO OM and SVG to PDA.   • ECHO - CONVERTED  08/29/2007    Echo-EF50-55%, mild to mod. MR, mildly hypokinetic septum.   • ECHO - CONVERTED  05/08/2009    Echo-EF45-49%, Moderate MR and PA pressure-42 mmHg.   • ECHO - CONVERTED  01/18/2012    Echo-EF 25-30%   • ECHO - CONVERTED  05/17/2012    Echo-EF<30%. AnteroSpectal WMA.   • ECHO - CONVERTED  09/02/2015    Echo-EF 30-35%, mod to severe MR   • ECHO - CONVERTED  03/15/2017    EF 35-40%, RVSP 25-30 mmHg   • ECHO - CONVERTED  11/13/2018    EF 35%. Inferolateral WMA. Mild- Mod MR. RVSP- 30 mmHg   • ECHO - CONVERTED  08/23/2021    EF 35%. Lateral WMA. LA- 4.7 Cm. Trace MR & AI. RVSP- 28 mmHg   • ECHO - CONVERTED   02/08/2024    Dil LV.EF 35%. Lateral WMA. LA- 4.3. Mild-Mod MR. RVSP- 43 mmHg   • OTHER SURGICAL HISTORY  02/28/2014    U/S Aorta-   • US CAROTID UNILATERAL  05/21/2015    Carotid US-no hemodyanamically significant stenosis   • US CAROTID UNILATERAL  04/10/1989    Carotid US-No sig. stenosis noted       Current Outpatient Medications   Medication Sig Dispense Refill   • apixaban (ELIQUIS) 5 MG tablet tablet Take 1 tablet by mouth 2 (Two) Times a Day. 60 tablet 8   • atorvastatin (LIPITOR) 80 MG tablet Take 1 tablet by mouth Daily. 90 tablet 3   • baclofen (LIORESAL) 10 MG tablet Take 1 tablet by mouth 2 (Two) Times a Day.     • carbidopa-levodopa (SINEMET)  MG per tablet Take 1 tablet by mouth 2 (Two) Times a Day.     • cetirizine (ZyrTEC) 10 MG tablet Take 1 tablet by mouth Daily.     • Cholecalciferol 50 MCG (2000 UT) tablet Take 1 tablet by mouth Daily.     • citalopram (CeleXA) 40 MG tablet Take 1 tablet by mouth Daily.     • cycloSPORINE (RESTASIS) 0.05 % ophthalmic emulsion TWICE DAILY     • ezetimibe (ZETIA) 10 MG tablet Take 1 tablet by mouth Daily. 90 tablet 3   • Farxiga 10 MG tablet Take 10 mg by mouth Daily. 90 tablet 3   • fenofibrate (TRICOR) 145 MG tablet Take 1 tablet by mouth Daily. 90 tablet 3   • HYDROcodone-acetaminophen (NORCO)  MG per tablet Take 1 tablet by mouth Every 6 (Six) Hours As Needed for Moderate Pain.     • Insulin Aspart Prot & Aspart (NOVOLOG MIX 70/30 PENFILL SC) Inject 65 Units under the skin into the appropriate area as directed 2 (Two) Times a Day With Meals.     • Insulin Glargine, 1 Unit Dial, (Toujeo SoloStar) 300 UNIT/ML solution pen-injector injection Inject  under the skin into the appropriate area as directed.     • nebivolol (BYSTOLIC) 5 MG tablet Take 1 tablet by mouth Daily. 90 tablet 3   • nitroglycerin (NITROSTAT) 0.4 MG SL tablet Place 1 tablet under the tongue Every 5 (Five) Minutes As Needed for Chest Pain. Take no more than 3 doses in 15 minutes. 25  "tablet 3   • O2 (OXYGEN) Inhale 2 L/min at night as needed.     • ondansetron (ZOFRAN) 8 MG tablet Take 1 tablet by mouth 3 (Three) Times a Day As Needed for Nausea or Vomiting.     • ONE TOUCH ULTRA TEST test strip   0   • ONETOUCH DELICA LANCETS 33G misc   0   • polyethylene glycol (MIRALAX) packet Take 17 g by mouth Daily As Needed.     • sacubitril-valsartan (Entresto) 24-26 MG tablet Take 1 tablet by mouth 2 (Two) Times a Day. 180 tablet 1   • SITagliptin (JANUVIA) 100 MG tablet Take 1 tablet by mouth Daily.     • spironolactone (ALDACTONE) 25 MG tablet Take 1 tablet by mouth Daily. 90 tablet 3   • SURE COMFORT INSULIN SYRINGE 31G X 5/16\" 1 ML misc   6   • ticagrelor (BRILINTA) 60 MG tablet tablet Take 1 tablet by mouth 2 (Two) Times a Day. 180 tablet 3   • dicyclomine (BENTYL) 10 MG capsule Take 1 capsule by mouth 2 (Two) Times a Day With Meals. 60 capsule 3   • lansoprazole (Prevacid) 30 MG capsule Take 1 capsule by mouth Daily. 90 capsule 3     No current facility-administered medications for this visit.       Allergies  :  Effient [prasugrel], Imdur [isosorbide dinitrate], Livalo [pitavastatin], Ranolazine, and Repatha [evolocumab]       Past Medical History:   Diagnosis Date   • Disease of thyroid gland 04/10/2009    small colloid cyst right thyroid   • Esophageal dilatation     Followed by Dr. Kyle   • Heel spurs removed    • History of left hip replacement 2023   • Hypercholesterolemia    • Hypertension     Systemic   • IHD (ischemic heart disease)    • Other abnormal cytological findings on specimens from anus 2006 & 2007    [facility]   • Pain     Chronic       Social History     Socioeconomic History   • Marital status:    Tobacco Use   • Smoking status: Former   • Smokeless tobacco: Current     Types: Chew   • Tobacco comments:     11/24/2015   Vaping Use   • Vaping status: Never Used   Substance and Sexual Activity   • Alcohol use: No   • Drug use: Defer   • Sexual activity: Defer       Family " "History   Problem Relation Age of Onset   • Heart disease Mother    • Stroke Mother    • Diabetes Mother    • Diabetes Father    • Cirrhosis Father    • Heart disease Other    • Diabetes Other        Review of Systems   Constitutional: Positive for malaise/fatigue. Negative for decreased appetite.   HENT:  Negative for congestion and sore throat.    Eyes:  Negative for blurred vision, double vision and visual disturbance.   Cardiovascular:  Positive for chest pain and dyspnea on exertion.   Respiratory:  Positive for shortness of breath. Negative for snoring.    Endocrine: Negative for cold intolerance and heat intolerance.   Hematologic/Lymphatic: Negative for adenopathy. Does not bruise/bleed easily.   Skin:  Negative for itching, nail changes and skin cancer.   Musculoskeletal:  Negative for arthritis and myalgias.   Gastrointestinal:  Negative for abdominal pain, dysphagia and heartburn.   Genitourinary:  Negative for bladder incontinence and frequency.   Neurological:  Negative for dizziness, seizures and vertigo.   Psychiatric/Behavioral:  Negative for altered mental status.    Allergic/Immunologic: Negative for environmental allergies and hives.     Diabetes- Yes  Thyroid- normal    Objective     BP 96/58   Pulse 76   Ht 177.8 cm (70\")   Wt 87.5 kg (193 lb)   BMI 27.69 kg/m²     Vitals and nursing note reviewed.   Constitutional:       Appearance: Healthy appearance. Not in distress.   Eyes:      Conjunctiva/sclera: Conjunctivae normal.      Pupils: Pupils are equal, round, and reactive to light.   HENT:      Head: Normocephalic.   Pulmonary:      Effort: Pulmonary effort is normal.      Breath sounds: Normal breath sounds.   Cardiovascular:      PMI at left midclavicular line. Normal rate. Regular rhythm. loud S2.       Murmurs: There is a grade 3/6 high frequency blowing holosystolic murmur at the apex.   Abdominal:      General: Bowel sounds are normal.      Palpations: Abdomen is soft. "   Musculoskeletal: Normal range of motion.      Cervical back: Normal range of motion and neck supple. Skin:     General: Skin is warm and dry.   Neurological:      Mental Status: Alert, oriented to person, place, and time and oriented to person, place and time.     ECG 12 Lead    Date/Time: 10/2/2024 11:56 AM  Performed by: Alfonso Anaya MD    Authorized by: Alfonso Anaya MD  Comparison: compared with previous ECG from 3/20/2024  Similar to previous ECG  Rhythm: paced  Rate: normal  QRS axis: normal    Clinical impression: abnormal EKG              @ASSESSMENT/PLAN@  BMI is >= 25 and <30. (Overweight) The following options were offered after discussion;: nutrition counseling/recommendations     Diagnoses and all orders for this visit:    1. Shortness of breath (Primary)    2. Congestive heart failure with LV diastolic dysfunction, NYHA class 2  -     sacubitril-valsartan (Entresto) 24-26 MG tablet; Take 1 tablet by mouth 2 (Two) Times a Day.  Dispense: 180 tablet; Refill: 1  -     spironolactone (ALDACTONE) 25 MG tablet; Take 1 tablet by mouth Daily.  Dispense: 90 tablet; Refill: 3  -     Farxiga 10 MG tablet; Take 10 mg by mouth Daily.  Dispense: 90 tablet; Refill: 3    3. Essential hypertension  -     nebivolol (BYSTOLIC) 5 MG tablet; Take 1 tablet by mouth Daily.  Dispense: 90 tablet; Refill: 3    4. Hypercholesterolemia  -     atorvastatin (LIPITOR) 80 MG tablet; Take 1 tablet by mouth Daily.  Dispense: 90 tablet; Refill: 3  -     ezetimibe (ZETIA) 10 MG tablet; Take 1 tablet by mouth Daily.  Dispense: 90 tablet; Refill: 3  -     fenofibrate (TRICOR) 145 MG tablet; Take 1 tablet by mouth Daily.  Dispense: 90 tablet; Refill: 3    5. IHD (ischemic heart disease)  -     nitroglycerin (NITROSTAT) 0.4 MG SL tablet; Place 1 tablet under the tongue Every 5 (Five) Minutes As Needed for Chest Pain. Take no more than 3 doses in 15 minutes.  Dispense: 25 tablet; Refill: 3  -     apixaban (ELIQUIS) 5 MG  tablet tablet; Take 1 tablet by mouth 2 (Two) Times a Day.  Dispense: 60 tablet; Refill: 8  -     ticagrelor (BRILINTA) 60 MG tablet tablet; Take 1 tablet by mouth 2 (Two) Times a Day.  Dispense: 180 tablet; Refill: 3    6. Chest pressure  -     apixaban (ELIQUIS) 5 MG tablet tablet; Take 1 tablet by mouth 2 (Two) Times a Day.  Dispense: 60 tablet; Refill: 8  -     lansoprazole (Prevacid) 30 MG capsule; Take 1 capsule by mouth Daily.  Dispense: 90 capsule; Refill: 3  -     dicyclomine (BENTYL) 10 MG capsule; Take 1 capsule by mouth 2 (Two) Times a Day With Meals.  Dispense: 60 capsule; Refill: 3    7. Type 2 diabetes mellitus without complication, with long-term current use of insulin    8. PAF (paroxysmal atrial fibrillation)    At baseline his heart rate is stable.  His blood pressure is lower limit of normal.  His EKG showed he is pacing both the atrium and in the ventricle.  His cardiovascular examination reveals slightly loud second heart sound and a systolic murmur at the mitral area.    Regarding his shortness of breath which is the main problem he has, it is most likely secondary to his cardiomyopathy and the drop in the EF.  At this time we will continue the Entresto, Aldactone, Bystolic and Farxiga.  He had no recent admission for congestive heart failure so Verquvo could not be started.    Regarding his congestive heart failure, he is in class II.  Will continue the medications and have the have the electrolytes and renal function checked    Regarding his hypertension, he is now mildly hypotensive but he is asymptomatic.  Will continue the same along with the Bystolic    Regarding his hypercholesterolemia, will recheck his lipid profile and if the LDL has gone up after stopping Repatha then we will try to get will try to get Leqvio  authorized.    Regarding ischemic heart disease, he has been treated with Brilinta and nitroglycerin as needed.  He could not tolerate Imdur or Ranexa.  I talked him about  repeating the cardiac catheterization.  He at this time wants to try medical management.  Since he does have gastroesophageal reflux, I stopped his Protonix and will try a stronger PPI in the form of Prevacid.  I also added Bentyl 10 mg twice daily.  If his symptom does not get any better, we will plan to do elective cardiac catheterization if he agrees    Regarding his diabetes, he is on insulin along with Januvia and also Farxiga.  We need to check his A1c level.  I had a long talk with him as well as with his daughter about poorly controlled diabetes.  I talked to him about cutting down on the carbohydrate intake    Regarding his PAF, he is asymptomatic.  His CHADS2 score is 5 so we will continue Eliquis    Regarding advanced directive, I talked to him about living will and power of  and gave him booklet    I will see him back in 6 months and at that time we will have the ICD interrogated also.  His ICD check today showed that he is still functioning well and had few short runs of A-fib.                    Electronically signed by Alfonso Anaya MD October 2, 2024 11:51 EDT

## 2024-10-02 NOTE — LETTER
"October 2, 2024       No Recipients    Patient: Adrian Samuel   YOB: 1955   Date of Visit: 10/2/2024     Dear Deonte Buckner MD:       Thank you for referring Adrian Samuel to me for evaluation. Below are the relevant portions of my assessment and plan of care.    If you have questions, please do not hesitate to call me. I look forward to following Adrian along with you.         Sincerely,        Alfonso Anaya MD        CC:   No Recipients    Alfonso Anaya MD  10/02/24 1156  Sign when Signing Visit  Chief Complaint   Patient presents with   • Follow-up     For cardiac management, SOB noted.    • Medication Problem     Pt didn't bring medication or list. He states PCP has not changed any medication that he is aware of.   Pt said the only change was he quit the Repatha ( see telephone encounter 8/6/24), states he \"had a lot kidney problems/ just didn't feel good\", reports doing better since stopping it.    • Pacemaker Check     St russell check today in office, report in the door.  Last remote monitoring 7/21/24.    • labs     Lipids in chart from April while still on Repatha. PCP checked labs about a month ago.    • Shortness of Breath     Seems to be getting progressively worse.    • Med Refill     Refills needed on cardiac meds. 90 days to F&H       CARDIAC COMPLAINTS  chest pressure/discomfort, dyspnea, and fatigue        Subjective  Adrian Samuel is a 69 y.o. male came today for his regular cardiac follow-up.  He has history of diabetes, hypertension, hypercholesterolemia who has a very complex cardiac history.  He was first diagnosed with ischemic heart disease in 93 when he underwent PTCA.  He had a lot of restenosis and underwent bypass surgery 95 with LIMA to LAD SVG to OM and SVG to PDA.  Since then he has undergone multiple cardiac catheterization.  His vein graft to OM failure and underwent stenting of the OM in 2010.  He then noted to have significant drop in the EF and had a BiV ICD " placed.  After which he underwent stenting of the LAD, stenting of the vein graft to RCA multiple times.  The last cardiac catheterization was in 2018 when he underwent stenting of the vein graft to PDA.  His EF did show some mild improvement but dropped again.  In 2021 he had lateral wall ischemia but he wanted to try medical management.  In 2024 his stress test showed drop in the EF with a lateral infarct.  He also is on maximum medication for cholesterol including Zetia, Lipitor 80 and Tricor.  He is lipid panel in April showed LDL did come down with the addition of Repatha.  From 115 it came down to 22.  Few months later she stopped taking Repatha because it was causing some kind of kidney problem.  He also noted to have multiple short runs of PAF by the remote monitoring and so NOAC  was added.  He came today stating that he is still noticing shortness of breath is still having chest pain he takes multiple nitroglycerin.  His daughter also called him on the phone and I talked to her about him also.  One of her major problem I explained to her is the blood sugar.  His A1c was 11.20.  I also talked to him about repeating the cardiac cath if he has more chest pain.              Cardiac History  Past Surgical History:   Procedure Laterality Date   • BIVENTRICULLAR IMPLANTABLE CARDIOVERTER DEFIBRILLATOR PLACEMENT  07/06/2020    St. Cam replacement by Dr. Fuentes   • CARDIAC CATHETERIZATION  1993    Cath-PTCA.   • CARDIAC CATHETERIZATION  02/08/2005    Cath-() Patent Grafts   • CARDIAC CATHETERIZATION  08/01/2005    Cath-(Dr. Sigala) Patent Grafts.   • CARDIAC CATHETERIZATION  09/18/2007    Cath-100%LAD, 80%D2. 100%LCX, RCA. Patent Lima & SVG to PDA.   • CARDIAC CATHETERIZATION  08/22/2006    Cath-Patent Grafts   • CARDIAC CATHETERIZATION  01/15/2010    Cath-Patent LIMA & SVG to PDA. 80% OM1-2.25 x 16mm Taxus Stent   • CARDIAC CATHETERIZATION  02/14/2012    Cath-IVUS LAD 60-70%, 2.55 x 20mm Promus stent  Ramus   • CARDIAC CATHETERIZATION  03/12/2013    Cath-3.5 x 28Promus stent SVG to RCA.   • CARDIAC CATHETERIZATION  11/25/2013    Cath-(Dr. Sigala) EF30-35%, occluded circ and graft but increase collaterol flow manage medically.   • CARDIAC CATHETERIZATION  12/22/2014    Cath-85% SVG to PDA-   • CARDIAC CATHETERIZATION  03/18/2015    Cath-90%LIMA to LAD-80% SVG to PDA-2.75 x 8 Resolute   • CARDIAC CATHETERIZATION  05/29/2018    80% SVG to PDA- 3.5x30 Resolute Stent.   • CARDIAC DEFIBRILLATOR PLACEMENT  2012    BiV/ICD, SJM   • CARDIOVASCULAR STRESS TEST  02/19/2008    D.Myoview-75%THR. EF 43%. InferoLateral Infarct with Nba-Infarct Ischemia   • CARDIOVASCULAR STRESS TEST  05/08/2009    D.Myoview-78%THR. EF48%. Inferiolateral wall infarct and nba-infarct ischemia.   • CARDIOVASCULAR STRESS TEST  03/03/2010    L.Myoview-Lateral Ischemia   • CARDIOVASCULAR STRESS TEST  02/13/2013    L.Myoview-lateral wall ischemia   • CARDIOVASCULAR STRESS TEST  11/11/2013    L.Myoview-lateral wall ischemia   • CARDIOVASCULAR STRESS TEST  11/18/2014    L.Myoview-([facility]) EF. Inferolateral Infarct   • CARDIOVASCULAR STRESS TEST  03/17/2015    L.Myoview-Sig Anterior Changes   • CARDIOVASCULAR STRESS TEST  03/15/2017    Lexiscan- lateral wall ischemia, nitrodur increased, cath if symptoms persist   • CARDIOVASCULAR STRESS TEST  11/13/2018    L. Cardiolite- EF 34%. Inferolateral Infarct with periinfarct Ischemia.   • CARDIOVASCULAR STRESS TEST  08/23/2021    Lexiscan- EF 34%. Lateral Ischemia   • CARDIOVASCULAR STRESS TEST  04/08/2024    Lexiscan- EF 25%. Lateral Infarct   • CONVERTED (HISTORICAL) CARDIOVASCULAR STUDIES  09/05/2012    MUGA-RVEF 41%, LVEF 38%   • CONVERTED (HISTORICAL) HOLTER  06/11/2006    Holter-PVC's noted   • CORONARY ARTERY BYPASS GRAFT  1995    CABG LIMA TO LAD, SVG TO OM and SVG to PDA.   • ECHO - CONVERTED  08/29/2007    Echo-EF50-55%, mild to mod. MR, mildly hypokinetic septum.   • ECHO - CONVERTED  05/08/2009     Echo-EF45-49%, Moderate MR and PA pressure-42 mmHg.   • ECHO - CONVERTED  01/18/2012    Echo-EF 25-30%   • ECHO - CONVERTED  05/17/2012    Echo-EF<30%. AnteroSpectal WMA.   • ECHO - CONVERTED  09/02/2015    Echo-EF 30-35%, mod to severe MR   • ECHO - CONVERTED  03/15/2017    EF 35-40%, RVSP 25-30 mmHg   • ECHO - CONVERTED  11/13/2018    EF 35%. Inferolateral WMA. Mild- Mod MR. RVSP- 30 mmHg   • ECHO - CONVERTED  08/23/2021    EF 35%. Lateral WMA. LA- 4.7 Cm. Trace MR & AI. RVSP- 28 mmHg   • ECHO - CONVERTED  02/08/2024    Dil LV.EF 35%. Lateral WMA. LA- 4.3. Mild-Mod MR. RVSP- 43 mmHg   • OTHER SURGICAL HISTORY  02/28/2014    U/S Aorta-   • US CAROTID UNILATERAL  05/21/2015    Carotid US-no hemodyanamically significant stenosis   • US CAROTID UNILATERAL  04/10/1989    Carotid US-No sig. stenosis noted       Current Outpatient Medications   Medication Sig Dispense Refill   • apixaban (ELIQUIS) 5 MG tablet tablet Take 1 tablet by mouth 2 (Two) Times a Day. 60 tablet 8   • atorvastatin (LIPITOR) 80 MG tablet Take 1 tablet by mouth Daily. 90 tablet 3   • baclofen (LIORESAL) 10 MG tablet Take 1 tablet by mouth 2 (Two) Times a Day.     • carbidopa-levodopa (SINEMET)  MG per tablet Take 1 tablet by mouth 2 (Two) Times a Day.     • cetirizine (ZyrTEC) 10 MG tablet Take 1 tablet by mouth Daily.     • Cholecalciferol 50 MCG (2000 UT) tablet Take 1 tablet by mouth Daily.     • citalopram (CeleXA) 40 MG tablet Take 1 tablet by mouth Daily.     • cycloSPORINE (RESTASIS) 0.05 % ophthalmic emulsion TWICE DAILY     • ezetimibe (ZETIA) 10 MG tablet Take 1 tablet by mouth Daily. 90 tablet 3   • Farxiga 10 MG tablet Take 10 mg by mouth Daily. 90 tablet 3   • fenofibrate (TRICOR) 145 MG tablet Take 1 tablet by mouth Daily. 90 tablet 3   • HYDROcodone-acetaminophen (NORCO)  MG per tablet Take 1 tablet by mouth Every 6 (Six) Hours As Needed for Moderate Pain.     • Insulin Aspart Prot & Aspart (NOVOLOG MIX 70/30 PENFILL SC)  "Inject 65 Units under the skin into the appropriate area as directed 2 (Two) Times a Day With Meals.     • Insulin Glargine, 1 Unit Dial, (Toujeo SoloStar) 300 UNIT/ML solution pen-injector injection Inject  under the skin into the appropriate area as directed.     • nebivolol (BYSTOLIC) 5 MG tablet Take 1 tablet by mouth Daily. 90 tablet 3   • nitroglycerin (NITROSTAT) 0.4 MG SL tablet Place 1 tablet under the tongue Every 5 (Five) Minutes As Needed for Chest Pain. Take no more than 3 doses in 15 minutes. 25 tablet 3   • O2 (OXYGEN) Inhale 2 L/min at night as needed.     • ondansetron (ZOFRAN) 8 MG tablet Take 1 tablet by mouth 3 (Three) Times a Day As Needed for Nausea or Vomiting.     • ONE TOUCH ULTRA TEST test strip   0   • ONETOUCH DELICA LANCETS 33G misc   0   • polyethylene glycol (MIRALAX) packet Take 17 g by mouth Daily As Needed.     • sacubitril-valsartan (Entresto) 24-26 MG tablet Take 1 tablet by mouth 2 (Two) Times a Day. 180 tablet 1   • SITagliptin (JANUVIA) 100 MG tablet Take 1 tablet by mouth Daily.     • spironolactone (ALDACTONE) 25 MG tablet Take 1 tablet by mouth Daily. 90 tablet 3   • SURE COMFORT INSULIN SYRINGE 31G X 5/16\" 1 ML misc   6   • ticagrelor (BRILINTA) 60 MG tablet tablet Take 1 tablet by mouth 2 (Two) Times a Day. 180 tablet 3   • dicyclomine (BENTYL) 10 MG capsule Take 1 capsule by mouth 2 (Two) Times a Day With Meals. 60 capsule 3   • lansoprazole (Prevacid) 30 MG capsule Take 1 capsule by mouth Daily. 90 capsule 3     No current facility-administered medications for this visit.       Allergies  :  Effient [prasugrel], Imdur [isosorbide dinitrate], Livalo [pitavastatin], Ranolazine, and Repatha [evolocumab]       Past Medical History:   Diagnosis Date   • Disease of thyroid gland 04/10/2009    small colloid cyst right thyroid   • Esophageal dilatation     Followed by Dr. Kyle   • Heel spurs removed    • History of left hip replacement 2023   • Hypercholesterolemia    • " "Hypertension     Systemic   • IHD (ischemic heart disease)    • Other abnormal cytological findings on specimens from anus 2006 & 2007    [facility]   • Pain     Chronic       Social History     Socioeconomic History   • Marital status:    Tobacco Use   • Smoking status: Former   • Smokeless tobacco: Current     Types: Chew   • Tobacco comments:     11/24/2015   Vaping Use   • Vaping status: Never Used   Substance and Sexual Activity   • Alcohol use: No   • Drug use: Defer   • Sexual activity: Defer       Family History   Problem Relation Age of Onset   • Heart disease Mother    • Stroke Mother    • Diabetes Mother    • Diabetes Father    • Cirrhosis Father    • Heart disease Other    • Diabetes Other        Review of Systems   Constitutional: Positive for malaise/fatigue. Negative for decreased appetite.   HENT:  Negative for congestion and sore throat.    Eyes:  Negative for blurred vision, double vision and visual disturbance.   Cardiovascular:  Positive for chest pain and dyspnea on exertion.   Respiratory:  Positive for shortness of breath. Negative for snoring.    Endocrine: Negative for cold intolerance and heat intolerance.   Hematologic/Lymphatic: Negative for adenopathy. Does not bruise/bleed easily.   Skin:  Negative for itching, nail changes and skin cancer.   Musculoskeletal:  Negative for arthritis and myalgias.   Gastrointestinal:  Negative for abdominal pain, dysphagia and heartburn.   Genitourinary:  Negative for bladder incontinence and frequency.   Neurological:  Negative for dizziness, seizures and vertigo.   Psychiatric/Behavioral:  Negative for altered mental status.    Allergic/Immunologic: Negative for environmental allergies and hives.     Diabetes- Yes  Thyroid- normal    Objective    BP 96/58   Pulse 76   Ht 177.8 cm (70\")   Wt 87.5 kg (193 lb)   BMI 27.69 kg/m²     Vitals and nursing note reviewed.   Constitutional:       Appearance: Healthy appearance. Not in distress.   Eyes:      " Conjunctiva/sclera: Conjunctivae normal.      Pupils: Pupils are equal, round, and reactive to light.   HENT:      Head: Normocephalic.   Pulmonary:      Effort: Pulmonary effort is normal.      Breath sounds: Normal breath sounds.   Cardiovascular:      PMI at left midclavicular line. Normal rate. Regular rhythm. loud S2.       Murmurs: There is a grade 3/6 high frequency blowing holosystolic murmur at the apex.   Abdominal:      General: Bowel sounds are normal.      Palpations: Abdomen is soft.   Musculoskeletal: Normal range of motion.      Cervical back: Normal range of motion and neck supple. Skin:     General: Skin is warm and dry.   Neurological:      Mental Status: Alert, oriented to person, place, and time and oriented to person, place and time.     ECG 12 Lead    Date/Time: 10/2/2024 11:56 AM  Performed by: Alfonso Anaya MD    Authorized by: Alfonso Anaya MD  Comparison: compared with previous ECG from 3/20/2024  Similar to previous ECG  Rhythm: paced  Rate: normal  QRS axis: normal    Clinical impression: abnormal EKG              @ASSESSMENT/PLAN@  BMI is >= 25 and <30. (Overweight) The following options were offered after discussion;: nutrition counseling/recommendations     Diagnoses and all orders for this visit:    1. Shortness of breath (Primary)    2. Congestive heart failure with LV diastolic dysfunction, NYHA class 2  -     sacubitril-valsartan (Entresto) 24-26 MG tablet; Take 1 tablet by mouth 2 (Two) Times a Day.  Dispense: 180 tablet; Refill: 1  -     spironolactone (ALDACTONE) 25 MG tablet; Take 1 tablet by mouth Daily.  Dispense: 90 tablet; Refill: 3  -     Farxiga 10 MG tablet; Take 10 mg by mouth Daily.  Dispense: 90 tablet; Refill: 3    3. Essential hypertension  -     nebivolol (BYSTOLIC) 5 MG tablet; Take 1 tablet by mouth Daily.  Dispense: 90 tablet; Refill: 3    4. Hypercholesterolemia  -     atorvastatin (LIPITOR) 80 MG tablet; Take 1 tablet by mouth Daily.  Dispense:  90 tablet; Refill: 3  -     ezetimibe (ZETIA) 10 MG tablet; Take 1 tablet by mouth Daily.  Dispense: 90 tablet; Refill: 3  -     fenofibrate (TRICOR) 145 MG tablet; Take 1 tablet by mouth Daily.  Dispense: 90 tablet; Refill: 3    5. IHD (ischemic heart disease)  -     nitroglycerin (NITROSTAT) 0.4 MG SL tablet; Place 1 tablet under the tongue Every 5 (Five) Minutes As Needed for Chest Pain. Take no more than 3 doses in 15 minutes.  Dispense: 25 tablet; Refill: 3  -     apixaban (ELIQUIS) 5 MG tablet tablet; Take 1 tablet by mouth 2 (Two) Times a Day.  Dispense: 60 tablet; Refill: 8  -     ticagrelor (BRILINTA) 60 MG tablet tablet; Take 1 tablet by mouth 2 (Two) Times a Day.  Dispense: 180 tablet; Refill: 3    6. Chest pressure  -     apixaban (ELIQUIS) 5 MG tablet tablet; Take 1 tablet by mouth 2 (Two) Times a Day.  Dispense: 60 tablet; Refill: 8  -     lansoprazole (Prevacid) 30 MG capsule; Take 1 capsule by mouth Daily.  Dispense: 90 capsule; Refill: 3  -     dicyclomine (BENTYL) 10 MG capsule; Take 1 capsule by mouth 2 (Two) Times a Day With Meals.  Dispense: 60 capsule; Refill: 3    7. Type 2 diabetes mellitus without complication, with long-term current use of insulin    8. PAF (paroxysmal atrial fibrillation)    At baseline his heart rate is stable.  His blood pressure is lower limit of normal.  His EKG showed he is pacing both the atrium and in the ventricle.  His cardiovascular examination reveals slightly loud second heart sound and a systolic murmur at the mitral area.    Regarding his shortness of breath which is the main problem he has, it is most likely secondary to his cardiomyopathy and the drop in the EF.  At this time we will continue the Entresto, Aldactone, Bystolic and Farxiga.  He had no recent admission for congestive heart failure so Verquvo could not be started.    Regarding his congestive heart failure, he is in class II.  Will continue the medications and have the have the electrolytes and  renal function checked    Regarding his hypertension, he is now mildly hypotensive but he is asymptomatic.  Will continue the same along with the Bystolic    Regarding his hypercholesterolemia, will recheck his lipid profile and if the LDL has gone up after stopping Repatha then we will try to get will try to get Leqvijennie  authorized.    Regarding ischemic heart disease, he has been treated with Brilinta and nitroglycerin as needed.  He could not tolerate Imdur or Ranexa.  I talked him about repeating the cardiac catheterization.  He at this time wants to try medical management.  Since he does have gastroesophageal reflux, I stopped his Protonix and will try a stronger PPI in the form of Prevacid.  I also added Bentyl 10 mg twice daily.  If his symptom does not get any better, we will plan to do elective cardiac catheterization if he agrees    Regarding his diabetes, he is on insulin along with Januvia and also Farxiga.  We need to check his A1c level.  I had a long talk with him as well as with his daughter about poorly controlled diabetes.  I talked to him about cutting down on the carbohydrate intake    Regarding his PAF, he is asymptomatic.  His CHADS2 score is 5 so we will continue Eliquis    Regarding advanced directive, I talked to him about living will and power of  and gave him booklet    I will see him back in 6 months and at that time we will have the ICD interrogated also.  His ICD check today showed that he is still functioning well and had few short runs of A-fib.                    Electronically signed by Alfonso Anaya MD October 2, 2024 11:51 EDT

## 2024-10-20 PROCEDURE — 93296 REM INTERROG EVL PM/IDS: CPT | Performed by: INTERNAL MEDICINE

## 2024-10-20 PROCEDURE — 93295 DEV INTERROG REMOTE 1/2/MLT: CPT | Performed by: INTERNAL MEDICINE

## 2024-11-01 ENCOUNTER — TELEPHONE (OUTPATIENT)
Dept: CARDIOLOGY | Facility: CLINIC | Age: 69
End: 2024-11-01
Payer: MEDICARE

## 2024-11-01 NOTE — TELEPHONE ENCOUNTER
Dr America Timmons at [facility] called requesting last office note and echo be faxed to her at [phone]. Pt has been admitted there. Records faxed, see release.

## 2024-11-04 DIAGNOSIS — I25.9 IHD (ISCHEMIC HEART DISEASE): ICD-10-CM

## 2024-11-04 DIAGNOSIS — R07.89 CHEST PRESSURE: ICD-10-CM

## 2024-11-04 RX ORDER — APIXABAN 5 MG/1
5 TABLET, FILM COATED ORAL 2 TIMES DAILY
Qty: 60 TABLET | Refills: 5 | OUTPATIENT
Start: 2024-11-04

## 2024-11-04 NOTE — TELEPHONE ENCOUNTER
Rx Refill Note  Requested Prescriptions     Pending Prescriptions Disp Refills    Eliquis 5 MG tablet tablet [Pharmacy Med Name: ELIQUIS 5 MG TABS 5 Tablet] 60 tablet 5     Sig: TAKE 1 TABLET TWICE DAILY      Last office visit with prescribing clinician: Visit date not found   Last telemedicine visit with prescribing clinician: Visit date not found   Next office visit with prescribing clinician: Visit date not found                         Would you like a call back once the refill request has been completed: [] Yes [] No    If the office needs to give you a call back, can they leave a voicemail: [] Yes [] No    Ana Maria Gruber CMA  11/04/24, 13:34 EST

## 2024-11-25 ENCOUNTER — TELEPHONE (OUTPATIENT)
Dept: CARDIOLOGY | Facility: CLINIC | Age: 69
End: 2024-11-25
Payer: MEDICARE

## 2024-11-25 RX ORDER — METOLAZONE 2.5 MG/1
2.5 TABLET ORAL DAILY
Qty: 7 TABLET | Refills: 0 | Status: SHIPPED | OUTPATIENT
Start: 2024-11-25 | End: 2024-12-02

## 2024-11-25 NOTE — TELEPHONE ENCOUNTER
Mamta with Dr. Youssef's office called stating that they are wanting patient seen in our office prior to patient having gallbladder surgery. Is it ok to schedule?

## 2024-11-25 NOTE — TELEPHONE ENCOUNTER
Patient's daughter, Jocelyn, was made aware that script for metolazone 2.5 mg daily for 7 days will be sent in to [facility] in [place].     She was also given appointment for tomorrow 11/26/24 at 1:30 pm with Dr. Anaya.

## 2024-11-25 NOTE — TELEPHONE ENCOUNTER
Patient's daughter, Jocelyn, was transferred to office by [facility]. She states that patient has been having issues with swelling in his abdomen and his ankles and feet. Patient was in [facility] from 10/31/24 to 11/03/24, was found to have gallstones, is seeing surgeon today about having cholecystectomy. She states that he was given injection of lasix at both hospital and at PCP office.     She states that patient went back to ER on 11/19/24 and was sent home with lasix 40 mg daily for 5 days, has finished that. States that ankles and feet are improving, but still has swelling in abdomen and is having shortness of breath, though it has improved some.    Patient is still taking spironolactone 25 mg daily.     Records are in chart under media for you to review.

## 2024-11-26 ENCOUNTER — TELEPHONE (OUTPATIENT)
Dept: CARDIOLOGY | Facility: CLINIC | Age: 69
End: 2024-11-26

## 2024-11-26 ENCOUNTER — OFFICE VISIT (OUTPATIENT)
Dept: CARDIOLOGY | Facility: CLINIC | Age: 69
End: 2024-11-26
Payer: MEDICARE

## 2024-11-26 VITALS
SYSTOLIC BLOOD PRESSURE: 100 MMHG | WEIGHT: 184 LBS | DIASTOLIC BLOOD PRESSURE: 56 MMHG | HEART RATE: 75 BPM | BODY MASS INDEX: 26.34 KG/M2 | HEIGHT: 70 IN

## 2024-11-26 DIAGNOSIS — I48.0 PAF (PAROXYSMAL ATRIAL FIBRILLATION): ICD-10-CM

## 2024-11-26 DIAGNOSIS — I10 ESSENTIAL HYPERTENSION: ICD-10-CM

## 2024-11-26 DIAGNOSIS — I25.5 CARDIOMYOPATHY, ISCHEMIC: Primary | ICD-10-CM

## 2024-11-26 DIAGNOSIS — K81.9 CHOLECYSTITIS: ICD-10-CM

## 2024-11-26 DIAGNOSIS — I50.30 CONGESTIVE HEART FAILURE WITH LV DIASTOLIC DYSFUNCTION, NYHA CLASS 2: ICD-10-CM

## 2024-11-26 DIAGNOSIS — R07.89 CHEST PRESSURE: ICD-10-CM

## 2024-11-26 DIAGNOSIS — Z79.4 TYPE 2 DIABETES MELLITUS WITHOUT COMPLICATION, WITH LONG-TERM CURRENT USE OF INSULIN: ICD-10-CM

## 2024-11-26 DIAGNOSIS — E11.9 TYPE 2 DIABETES MELLITUS WITHOUT COMPLICATION, WITH LONG-TERM CURRENT USE OF INSULIN: ICD-10-CM

## 2024-11-26 DIAGNOSIS — R06.02 SHORTNESS OF BREATH: ICD-10-CM

## 2024-11-26 DIAGNOSIS — E78.00 HYPERCHOLESTEROLEMIA: ICD-10-CM

## 2024-11-26 PROBLEM — I42.9 CARDIOMYOPATHY: Status: RESOLVED | Noted: 2017-09-12 | Resolved: 2024-11-26

## 2024-11-26 RX ORDER — FUROSEMIDE 40 MG/1
40 TABLET ORAL DAILY
Qty: 30 TABLET | Refills: 11 | Status: SHIPPED | OUTPATIENT
Start: 2024-11-26

## 2024-11-26 NOTE — TELEPHONE ENCOUNTER
Last office note from today (11/26/24) routed to Dr. Yancey office as cardiac clearance was given in office note.

## 2024-11-26 NOTE — PROGRESS NOTES
Chief Complaint   Patient presents with    Follow-up     For cardiac management, pt is needing cardiac clearance for cholecystectomy. Dr Yancey. Not scheduled, waiting on clearance. Recent admission in Nov to [facility] with abd pain,  back to ER the 19 th with pleural effusions. Gave him Lasix 40 mg daily for 5 days. All records in chart. Called yesterday, we sent in metolazone  2.5 mg daily for 7 days. Echo from 11/1 in chart.     labs     Most recent from hospital in chart.     Medication Problem     Pt didn't bring meds or list, he does not recognize any names of medication. States noting is different from when he was here in Oct except the mediation we started yesterday.     Edema     Seems to be progressively getting worse. Last day of Lasix was finished on about the 24th. Started metolazone yesterday.        CARDIAC COMPLAINTS  dyspnea and lower extremity edema        Subjective   Adrian Samuel is a 69 y.o. male came in today for his hospital follow-up visit.  He has a very complex cardiac history.  He was initially diagnosed in 93 when he underwent PTCA and underwent bypass surgery 95.  He had numerous cardiac catheterizations since then his vein graft to OM failed and had stenting of the OM.  He also developed significant drop in the EF and had a BiV ICD placed he later had stenting of the LAD and stenting of the vein graft to RCA.  The last one was in 2018 when he underwent stenting of the vein graft to PDA.  He was seen here in October for a routine checkup and noted to have shortness of breath and also has been having some chest pain.  He has not been taking care of his diabetes and has been significantly elevated.  His Protonix was changed to Prevacid.  He was advised about repeat cardiac catheterization which he declined.  He got admitted to the hospital this month with abdominal pain and found to have cholecystitis.  He is scheduled to undergo gallbladder surgery and needed cardiac clearance.  He came back  to the emergency room with shortness of breath found to have pulmonary congestion and pleural effusion.  He was given diuretics.  He was given only 5 days of Lasix.  He called our office recently and Zaroxolyn was added.  When he came today he said that the Zaroxolyn is not helping but found out that he also has not been taking Lasix.              Cardiac History  Past Surgical History:   Procedure Laterality Date    BIVENTRICULLAR IMPLANTABLE CARDIOVERTER DEFIBRILLATOR PLACEMENT  07/06/2020    St. Cam replacement by Dr. Fuentes    CARDIAC CATHETERIZATION  1993    Cath-PTCA.    CARDIAC CATHETERIZATION  02/08/2005    Cath-() Patent Grafts    CARDIAC CATHETERIZATION  08/01/2005    Cath-(Dr. Sigala) Patent Grafts.    CARDIAC CATHETERIZATION  09/18/2007    Cath-100%LAD, 80%D2. 100%LCX, RCA. Patent Lima & SVG to PDA.    CARDIAC CATHETERIZATION  08/22/2006    Cath-Patent Grafts    CARDIAC CATHETERIZATION  01/15/2010    Cath-Patent LIMA & SVG to PDA. 80% OM1-2.25 x 16mm Taxus Stent    CARDIAC CATHETERIZATION  02/14/2012    Cath-IVUS LAD 60-70%, 2.55 x 20mm Promus stent Ramus    CARDIAC CATHETERIZATION  03/12/2013    Cath-3.5 x 28Promus stent SVG to RCA.    CARDIAC CATHETERIZATION  11/25/2013    Cath-(Dr. Sigala) EF30-35%, occluded circ and graft but increase collaterol flow manage medically.    CARDIAC CATHETERIZATION  12/22/2014    Cath-85% SVG to PDA-    CARDIAC CATHETERIZATION  03/18/2015    Cath-90%LIMA to LAD-80% SVG to PDA-2.75 x 8 Resolute    CARDIAC CATHETERIZATION  05/29/2018    80% SVG to PDA- 3.5x30 Resolute Stent.    CARDIAC DEFIBRILLATOR PLACEMENT  2012    BiV/ICD, SJM    CARDIOVASCULAR STRESS TEST  02/19/2008    D.Myoview-75%THR. EF 43%. InferoLateral Infarct with Nba-Infarct Ischemia    CARDIOVASCULAR STRESS TEST  05/08/2009    D.Myoview-78%THR. EF48%. Inferiolateral wall infarct and nba-infarct ischemia.    CARDIOVASCULAR STRESS TEST  03/03/2010    L.Myoview-Lateral Ischemia     CARDIOVASCULAR STRESS TEST  02/13/2013    L.Myoview-lateral wall ischemia    CARDIOVASCULAR STRESS TEST  11/11/2013    L.Myoview-lateral wall ischemia    CARDIOVASCULAR STRESS TEST  11/18/2014    L.Myoview-([facility]) EF. Inferolateral Infarct    CARDIOVASCULAR STRESS TEST  03/17/2015    L.Myoview-Sig Anterior Changes    CARDIOVASCULAR STRESS TEST  03/15/2017    Lexiscan- lateral wall ischemia, nitrodur increased, cath if symptoms persist    CARDIOVASCULAR STRESS TEST  11/13/2018    L. Cardiolite- EF 34%. Inferolateral Infarct with periinfarct Ischemia.    CARDIOVASCULAR STRESS TEST  08/23/2021    Lexiscan- EF 34%. Lateral Ischemia    CARDIOVASCULAR STRESS TEST  04/08/2024    Lexiscan- EF 25%. Lateral Infarct    CONVERTED (HISTORICAL) CARDIOVASCULAR STUDIES  09/05/2012    MUGA-RVEF 41%, LVEF 38%    CONVERTED (HISTORICAL) HOLTER  06/11/2006    Holter-PVC's noted    CORONARY ARTERY BYPASS GRAFT  1995    CABG LIMA TO LAD, SVG TO OM and SVG to PDA.    ECHO - CONVERTED  08/29/2007    Echo-EF50-55%, mild to mod. MR, mildly hypokinetic septum.    ECHO - CONVERTED  05/08/2009    Echo-EF45-49%, Moderate MR and PA pressure-42 mmHg.    ECHO - CONVERTED  01/18/2012    Echo-EF 25-30%    ECHO - CONVERTED  05/17/2012    Echo-EF<30%. AnteroSpectal WMA.    ECHO - CONVERTED  09/02/2015    Echo-EF 30-35%, mod to severe MR    ECHO - CONVERTED  03/15/2017    EF 35-40%, RVSP 25-30 mmHg    ECHO - CONVERTED  11/13/2018    EF 35%. Inferolateral WMA. Mild- Mod MR. RVSP- 30 mmHg    ECHO - CONVERTED  08/23/2021    EF 35%. Lateral WMA. LA- 4.7 Cm. Trace MR & AI. RVSP- 28 mmHg    ECHO - CONVERTED  02/08/2024    Dil LV.EF 35%. Lateral WMA. LA- 4.3. Mild-Mod MR. RVSP- 43 mmHg    ECHO - CONVERTED  11/01/2024    @ [facility].- EF 35%. Mild-Mod MR. RVSP- 49 mmHg    OTHER SURGICAL HISTORY  02/28/2014    U/S Aorta-    US CAROTID UNILATERAL  05/21/2015    Carotid US-no hemodyanamically significant stenosis    US CAROTID UNILATERAL  04/10/1989    Carotid  US-No sig. stenosis noted       Current Outpatient Medications   Medication Sig Dispense Refill    apixaban (ELIQUIS) 5 MG tablet tablet Take 1 tablet by mouth 2 (Two) Times a Day. 60 tablet 8    atorvastatin (LIPITOR) 80 MG tablet Take 1 tablet by mouth Daily. 90 tablet 3    baclofen (LIORESAL) 10 MG tablet Take 1 tablet by mouth 2 (Two) Times a Day.      carbidopa-levodopa (SINEMET)  MG per tablet Take 1 tablet by mouth 2 (Two) Times a Day.      cetirizine (ZyrTEC) 10 MG tablet Take 1 tablet by mouth Daily.      Cholecalciferol 50 MCG (2000 UT) tablet Take 1 tablet by mouth Daily.      citalopram (CeleXA) 40 MG tablet Take 1 tablet by mouth Daily.      cycloSPORINE (RESTASIS) 0.05 % ophthalmic emulsion TWICE DAILY      dicyclomine (BENTYL) 10 MG capsule Take 1 capsule by mouth 2 (Two) Times a Day With Meals. 60 capsule 3    ezetimibe (ZETIA) 10 MG tablet Take 1 tablet by mouth Daily. 90 tablet 3    Farxiga 10 MG tablet Take 10 mg by mouth Daily. 90 tablet 3    fenofibrate (TRICOR) 145 MG tablet Take 1 tablet by mouth Daily. 90 tablet 3    HYDROcodone-acetaminophen (NORCO)  MG per tablet Take 1 tablet by mouth Every 6 (Six) Hours As Needed for Moderate Pain.      Insulin Aspart Prot & Aspart (NOVOLOG MIX 70/30 PENFILL SC) Inject 65 Units under the skin into the appropriate area as directed 2 (Two) Times a Day With Meals.      Insulin Glargine, 1 Unit Dial, (Toujeo SoloStar) 300 UNIT/ML solution pen-injector injection Inject  under the skin into the appropriate area as directed.      lansoprazole (Prevacid) 30 MG capsule Take 1 capsule by mouth Daily. 90 capsule 3    metOLazone (ZAROXOLYN) 2.5 MG tablet Take 1 tablet by mouth Daily for 7 days. 7 tablet 0    nebivolol (BYSTOLIC) 5 MG tablet Take 1 tablet by mouth Daily. 90 tablet 3    nitroglycerin (NITROSTAT) 0.4 MG SL tablet Place 1 tablet under the tongue Every 5 (Five) Minutes As Needed for Chest Pain. Take no more than 3 doses in 15 minutes. 25  "tablet 3    O2 (OXYGEN) Inhale 2 L/min at night as needed.      ondansetron (ZOFRAN) 8 MG tablet Take 1 tablet by mouth 3 (Three) Times a Day As Needed for Nausea or Vomiting.      polyethylene glycol (MIRALAX) packet Take 17 g by mouth Daily As Needed.      sacubitril-valsartan (Entresto) 24-26 MG tablet Take 1 tablet by mouth 2 (Two) Times a Day. 180 tablet 1    SITagliptin (JANUVIA) 100 MG tablet Take 1 tablet by mouth Daily.      spironolactone (ALDACTONE) 25 MG tablet Take 1 tablet by mouth Daily. 90 tablet 3    ticagrelor (BRILINTA) 60 MG tablet tablet Take 1 tablet by mouth 2 (Two) Times a Day. 180 tablet 3    furosemide (LASIX) 40 MG tablet Take 1 tablet by mouth Daily. 30 tablet 11    ONE TOUCH ULTRA TEST test strip   0    ONETOUCH DELICA LANCETS 33G misc   0    SURE COMFORT INSULIN SYRINGE 31G X 5/16\" 1 ML misc   6     No current facility-administered medications for this visit.       Allergies  :  Effient [prasugrel], Imdur [isosorbide dinitrate], Livalo [pitavastatin], Ranolazine, and Repatha [evolocumab]       Past Medical History:   Diagnosis Date    Disease of thyroid gland 04/10/2009    small colloid cyst right thyroid    Esophageal dilatation     Followed by Dr. Anabella parsons removed     History of left hip replacement 2023    Hypercholesterolemia     Hypertension     Systemic    IHD (ischemic heart disease)     Other abnormal cytological findings on specimens from anus 2006 & 2007    EECP    Pain     Chronic       Social History     Socioeconomic History    Marital status:    Tobacco Use    Smoking status: Former    Smokeless tobacco: Current     Types: Chew    Tobacco comments:     11/24/2015   Vaping Use    Vaping status: Never Used   Substance and Sexual Activity    Alcohol use: No    Drug use: Defer    Sexual activity: Defer       Family History   Problem Relation Age of Onset    Heart disease Mother     Stroke Mother     Diabetes Mother     Diabetes Father     Cirrhosis Father  " "   Heart disease Other     Diabetes Other        Review of Systems   Constitutional: Negative for decreased appetite and malaise/fatigue.   HENT:  Negative for congestion and sore throat.    Eyes:  Negative for blurred vision, double vision and visual disturbance.   Cardiovascular:  Positive for chest pain and dyspnea on exertion.   Respiratory:  Positive for shortness of breath. Negative for snoring.    Endocrine: Negative for cold intolerance and heat intolerance.   Hematologic/Lymphatic: Negative for adenopathy. Does not bruise/bleed easily.   Skin:  Negative for itching, nail changes and skin cancer.   Musculoskeletal:  Negative for arthritis and myalgias.   Gastrointestinal:  Negative for abdominal pain, dysphagia and heartburn.   Genitourinary:  Negative for bladder incontinence and frequency.   Neurological:  Negative for dizziness, seizures and vertigo.   Psychiatric/Behavioral:  Negative for altered mental status.    Allergic/Immunologic: Negative for environmental allergies and hives.     Diabetes- Yes  Thyroid- normal    Objective     /56   Pulse 75   Ht 177.8 cm (70\")   Wt 83.5 kg (184 lb)   BMI 26.40 kg/m²     Vitals and nursing note reviewed.   Constitutional:       Appearance: Healthy appearance. Not in distress.   Eyes:      Conjunctiva/sclera: Conjunctivae normal.      Pupils: Pupils are equal, round, and reactive to light.   HENT:      Head: Normocephalic.   Pulmonary:      Effort: Pulmonary effort is normal.      Breath sounds: Normal breath sounds.   Cardiovascular:      PMI at left midclavicular line. Normal rate. Regular rhythm.      Murmurs: There is a grade 3/6 high frequency blowing holosystolic murmur at the apex.   Abdominal:      General: Bowel sounds are normal.      Palpations: Abdomen is soft.      Tenderness:  in the right upper quadrant   Musculoskeletal: Normal range of motion.      Cervical back: Normal range of motion and neck supple. Skin:     General: Skin is warm and " dry.   Neurological:      Mental Status: Alert, oriented to person, place, and time and oriented to person, place and time.     ECG 12 Lead    Date/Time: 11/26/2024 3:52 PM  Performed by: Alfonso Anaya MD    Authorized by: Alfonso Anaya MD  Comparison: compared with previous ECG from 11/19/2024  Similar to previous ECG  Rhythm: paced  Rate: normal  QRS axis: normal    Clinical impression: abnormal EKG              @ASSESSMENT/PLAN@        Diagnoses and all orders for this visit:    1. Cardiomyopathy, ischemic (Primary)  -     furosemide (LASIX) 40 MG tablet; Take 1 tablet by mouth Daily.  Dispense: 30 tablet; Refill: 11  -     Comprehensive Metabolic Panel; Future  -     proBNP; Future    2. Essential hypertension  -     Comprehensive Metabolic Panel; Future    3. Hypercholesterolemia  -     Lipid Panel; Future    4. Congestive heart failure with LV diastolic dysfunction, NYHA class 2  -     furosemide (LASIX) 40 MG tablet; Take 1 tablet by mouth Daily.  Dispense: 30 tablet; Refill: 11  -     proBNP; Future    5. Type 2 diabetes mellitus without complication, with long-term current use of insulin  -     Hemoglobin A1c; Future    6. PAF (paroxysmal atrial fibrillation)    7. Shortness of breath    8. Chest pressure  -     CBC & Differential; Future    9. Cholecystitis  -     CBC & Differential; Future       At baseline her heart rate and blood pressure appears normal.  EKG shows atrial and ventricular pacing.  His clinical examination reveals a BMI around 26.  His cardiovascular examination reveals systolic murmur at the mitral area and 1+ pedal edema    Regarding his ischemic cardiomyopathy his LV function is around 35% he is on Entresto, Farxiga and Aldactone.  He is not on any Lasix at this time.  I advised him to start taking Lasix at 40 mg once a day and advised him to continue the Zaroxolyn is given for 5 days.  He is advised to recheck his electrolytes,    His blood pressure is controlled with  the ARB and Bystolic.  Will continue the same    His hypercholesterolemia is controlled with combination of Lipitor, Zetia and Tricor.  Need to check the level again    His congestive heart failure is between class III to class II.  He was in the ER with last 3 and now is in 2.  He was given Lasix which did help him.  I advised him to continue on 40 mg Lasix and check his BNP level.  I also talked to him and his sister about the [employer].  I gave him papers about it and he is going to decide whether he will be interested in getting it    Regarding his diabetes he is on insulin Farxiga and Januvia.  Will check his A1c level again    His shortness of breath is most likely secondary to heart failure.  Hopefully the Lasix should help    His chest pain is almost similar to what he had.  It could be from the gallbladder problem also    Regarding his cholecystitis, he needs surgery.  His risk will be mild to moderate.  Cardiac clearance can be given.  He can hold Brilinta for 5 days and Eliquis for 3 days.    I will see him back as the appointment given before.                  Electronically signed by Alfonso Anaya MD November 26, 2024 15:42 EST

## 2024-11-26 NOTE — LETTER
November 26, 2024     Deonte Buckner MD  [address] [place] [address] [address] [place] [address]    Patient: Adrian Samuel   YOB: 1955   Date of Visit: 11/26/2024     Dear Deonte Buckner MD:       Thank you for referring Adrian Samuel to me for evaluation. Below are the relevant portions of my assessment and plan of care.    If you have questions, please do not hesitate to call me. I look forward to following Adrian along with you.         Sincerely,        Alfonso Anaya MD        CC: No Recipients    Alfonso Anaya MD  11/26/24 [id number]  Sign when Signing Visit  Chief Complaint   Patient presents with   • Follow-up     For cardiac management, pt is needing cardiac clearance for cholecystectomy. Dr Yancey. Not scheduled, waiting on clearance. Recent admission in Nov to [facility] with abd pain,  back to ER the 19 th with pleural effusions. Gave him Lasix 40 mg daily for 5 days. All records in chart. Called yesterday, we sent in metolazone  2.5 mg daily for 7 days. Echo from 11/1 in chart.    • labs     Most recent from hospital in chart.    • Medication Problem     Pt didn't bring meds or list, he does not recognize any names of medication. States noting is different from when he was here in Oct except the mediation we started yesterday.    • Edema     Seems to be progressively getting worse. Last day of Lasix was finished on about the 24th. Started metolazone yesterday.        CARDIAC COMPLAINTS  dyspnea and lower extremity edema        Subjective  Adrian Samuel is a 69 y.o. male came in today for his hospital follow-up visit.  He has a very complex cardiac history.  He was initially diagnosed in 93 when he underwent PTCA and underwent bypass surgery 95.  He had numerous cardiac catheterizations since then his vein graft to OM failed and had stenting of the OM.  He also developed significant drop in the EF and had a BiV ICD placed he later had stenting of the LAD and stenting of the vein graft to RCA.  The last one was in  2018 when he underwent stenting of the vein graft to PDA.  He was seen here in October for a routine checkup and noted to have shortness of breath and also has been having some chest pain.  He has not been taking care of his diabetes and has been significantly elevated.  His Protonix was changed to Prevacid.  He was advised about repeat cardiac catheterization which he declined.  He got admitted to the hospital this month with abdominal pain and found to have cholecystitis.  He is scheduled to undergo gallbladder surgery and needed cardiac clearance.  He came back to the emergency room with shortness of breath found to have pulmonary congestion and pleural effusion.  He was given diuretics.  He was given only 5 days of Lasix.  He called our office recently and Zaroxolyn was added.  When he came today he said that the Zaroxolyn is not helping but found out that he also has not been taking Lasix.              Cardiac History  Past Surgical History:   Procedure Laterality Date   • BIVENTRICULLAR IMPLANTABLE CARDIOVERTER DEFIBRILLATOR PLACEMENT  07/06/2020    St. Cam replacement by Dr. Fuentes   • CARDIAC CATHETERIZATION  1993    Cath-PTCA.   • CARDIAC CATHETERIZATION  02/08/2005    Cath-() Patent Grafts   • CARDIAC CATHETERIZATION  08/01/2005    Cath-(Dr. Sigala) Patent Grafts.   • CARDIAC CATHETERIZATION  09/18/2007    Cath-100%LAD, 80%D2. 100%LCX, RCA. Patent Lima & SVG to PDA.   • CARDIAC CATHETERIZATION  08/22/2006    Cath-Patent Grafts   • CARDIAC CATHETERIZATION  01/15/2010    Cath-Patent LIMA & SVG to PDA. 80% OM1-2.25 x 16mm Taxus Stent   • CARDIAC CATHETERIZATION  02/14/2012    Cath-IVUS LAD 60-70%, 2.55 x 20mm Promus stent Ramus   • CARDIAC CATHETERIZATION  03/12/2013    Cath-3.5 x 28Promus stent SVG to RCA.   • CARDIAC CATHETERIZATION  11/25/2013    Cath-(Dr. Sigala) EF30-35%, occluded circ and graft but increase collaterol flow manage medically.   • CARDIAC CATHETERIZATION  12/22/2014     Cath-85% SVG to PDA-   • CARDIAC CATHETERIZATION  03/18/2015    Cath-90%LIMA to LAD-80% SVG to PDA-2.75 x 8 Resolute   • CARDIAC CATHETERIZATION  05/29/2018    80% SVG to PDA- 3.5x30 Resolute Stent.   • CARDIAC DEFIBRILLATOR PLACEMENT  2012    BiV/ICD, [facility]   • CARDIOVASCULAR STRESS TEST  02/19/2008    D.Myoview-75%THR. EF 43%. InferoLateral Infarct with Nba-Infarct Ischemia   • CARDIOVASCULAR STRESS TEST  05/08/2009    D.Myoview-78%THR. EF48%. Inferiolateral wall infarct and nba-infarct ischemia.   • CARDIOVASCULAR STRESS TEST  03/03/2010    L.Myoview-Lateral Ischemia   • CARDIOVASCULAR STRESS TEST  02/13/2013    L.Myoview-lateral wall ischemia   • CARDIOVASCULAR STRESS TEST  11/11/2013    L.Myoview-lateral wall ischemia   • CARDIOVASCULAR STRESS TEST  11/18/2014    L.Myoview-([facility]) EF. Inferolateral Infarct   • CARDIOVASCULAR STRESS TEST  03/17/2015    L.Myoview-Sig Anterior Changes   • CARDIOVASCULAR STRESS TEST  03/15/2017    Lexiscan- lateral wall ischemia, nitrodur increased, cath if symptoms persist   • CARDIOVASCULAR STRESS TEST  11/13/2018    L. Cardiolite- EF 34%. Inferolateral Infarct with periinfarct Ischemia.   • CARDIOVASCULAR STRESS TEST  08/23/2021    Lexiscan- EF 34%. Lateral Ischemia   • CARDIOVASCULAR STRESS TEST  04/08/2024    Lexiscan- EF 25%. Lateral Infarct   • CONVERTED (HISTORICAL) CARDIOVASCULAR STUDIES  09/05/2012    MUGA-RVEF 41%, LVEF 38%   • CONVERTED (HISTORICAL) HOLTER  06/11/2006    Holter-PVC's noted   • CORONARY ARTERY BYPASS GRAFT  1995    CABG LIMA TO LAD, SVG TO OM and SVG to PDA.   • ECHO - CONVERTED  08/29/2007    Echo-EF50-55%, mild to mod. MR, mildly hypokinetic septum.   • ECHO - CONVERTED  05/08/2009    Echo-EF45-49%, Moderate MR and PA pressure-42 mmHg.   • ECHO - CONVERTED  01/18/2012    Echo-EF 25-30%   • ECHO - CONVERTED  05/17/2012    Echo-EF<30%. AnteroSpectal WMA.   • ECHO - CONVERTED  09/02/2015    Echo-EF 30-35%, mod to severe MR   • ECHO - CONVERTED  03/15/2017     EF 35-40%, RVSP 25-30 mmHg   • ECHO - CONVERTED  11/13/2018    EF 35%. Inferolateral WMA. Mild- Mod MR. RVSP- 30 mmHg   • ECHO - CONVERTED  08/23/2021    EF 35%. Lateral WMA. LA- 4.7 Cm. Trace MR & AI. RVSP- 28 mmHg   • ECHO - CONVERTED  02/08/2024    Dil LV.EF 35%. Lateral WMA. LA- 4.3. Mild-Mod MR. RVSP- 43 mmHg   • ECHO - CONVERTED  11/01/2024    @ [facility].- EF 35%. Mild-Mod MR. RVSP- 49 mmHg   • OTHER SURGICAL HISTORY  02/28/2014    U/S Aorta-   • US CAROTID UNILATERAL  05/21/2015    Carotid US-no hemodyanamically significant stenosis   • US CAROTID UNILATERAL  04/10/1989    Carotid US-No sig. stenosis noted       Current Outpatient Medications   Medication Sig Dispense Refill   • apixaban (ELIQUIS) 5 MG tablet tablet Take 1 tablet by mouth 2 (Two) Times a Day. 60 tablet 8   • atorvastatin (LIPITOR) 80 MG tablet Take 1 tablet by mouth Daily. 90 tablet 3   • baclofen (LIORESAL) 10 MG tablet Take 1 tablet by mouth 2 (Two) Times a Day.     • carbidopa-levodopa (SINEMET)  MG per tablet Take 1 tablet by mouth 2 (Two) Times a Day.     • cetirizine (ZyrTEC) 10 MG tablet Take 1 tablet by mouth Daily.     • Cholecalciferol 50 MCG (2000 UT) tablet Take 1 tablet by mouth Daily.     • citalopram (CeleXA) 40 MG tablet Take 1 tablet by mouth Daily.     • cycloSPORINE (RESTASIS) 0.05 % ophthalmic emulsion TWICE DAILY     • dicyclomine (BENTYL) 10 MG capsule Take 1 capsule by mouth 2 (Two) Times a Day With Meals. 60 capsule 3   • ezetimibe (ZETIA) 10 MG tablet Take 1 tablet by mouth Daily. 90 tablet 3   • Farxiga 10 MG tablet Take 10 mg by mouth Daily. 90 tablet 3   • fenofibrate (TRICOR) 145 MG tablet Take 1 tablet by mouth Daily. 90 tablet 3   • HYDROcodone-acetaminophen (NORCO)  MG per tablet Take 1 tablet by mouth Every 6 (Six) Hours As Needed for Moderate Pain.     • Insulin Aspart Prot & Aspart (NOVOLOG MIX 70/30 PENFILL SC) Inject 65 Units under the skin into the appropriate area as directed 2  "(Two) Times a Day With Meals.     • Insulin Glargine, 1 Unit Dial, (Toujeo SoloStar) 300 UNIT/ML solution pen-injector injection Inject  under the skin into the appropriate area as directed.     • lansoprazole (Prevacid) 30 MG capsule Take 1 capsule by mouth Daily. 90 capsule 3   • metOLazone (ZAROXOLYN) 2.5 MG tablet Take 1 tablet by mouth Daily for 7 days. 7 tablet 0   • nebivolol (BYSTOLIC) 5 MG tablet Take 1 tablet by mouth Daily. 90 tablet 3   • nitroglycerin (NITROSTAT) 0.4 MG SL tablet Place 1 tablet under the tongue Every 5 (Five) Minutes As Needed for Chest Pain. Take no more than 3 doses in 15 minutes. 25 tablet 3   • O2 (OXYGEN) Inhale 2 L/min at night as needed.     • ondansetron (ZOFRAN) 8 MG tablet Take 1 tablet by mouth 3 (Three) Times a Day As Needed for Nausea or Vomiting.     • polyethylene glycol (MIRALAX) packet Take 17 g by mouth Daily As Needed.     • sacubitril-valsartan (Entresto) 24-26 MG tablet Take 1 tablet by mouth 2 (Two) Times a Day. 180 tablet 1   • SITagliptin (JANUVIA) 100 MG tablet Take 1 tablet by mouth Daily.     • spironolactone (ALDACTONE) 25 MG tablet Take 1 tablet by mouth Daily. 90 tablet 3   • ticagrelor (BRILINTA) 60 MG tablet tablet Take 1 tablet by mouth 2 (Two) Times a Day. 180 tablet 3   • furosemide (LASIX) 40 MG tablet Take 1 tablet by mouth Daily. 30 tablet 11   • ONE TOUCH ULTRA TEST test strip   0   • ONETOUCH DELICA LANCETS 33G misc   0   • SURE COMFORT INSULIN SYRINGE 31G X 5/16\" 1 ML misc   6     No current facility-administered medications for this visit.       Allergies  :  Effient [prasugrel], Imdur [isosorbide dinitrate], Livalo [pitavastatin], Ranolazine, and Repatha [evolocumab]       Past Medical History:   Diagnosis Date   • Disease of thyroid gland 04/10/2009    small colloid cyst right thyroid   • Esophageal dilatation     Followed by Dr. Kyle   • Heel spurs removed    • History of left hip replacement 2023   • Hypercholesterolemia    • Hypertension  " "   Systemic   • IHD (ischemic heart disease)    • Other abnormal cytological findings on specimens from anus 2006 & 2007    [facility]   • Pain     Chronic       Social History     Socioeconomic History   • Marital status:    Tobacco Use   • Smoking status: Former   • Smokeless tobacco: Current     Types: Chew   • Tobacco comments:     11/24/2015   Vaping Use   • Vaping status: Never Used   Substance and Sexual Activity   • Alcohol use: No   • Drug use: Defer   • Sexual activity: Defer       Family History   Problem Relation Age of Onset   • Heart disease Mother    • Stroke Mother    • Diabetes Mother    • Diabetes Father    • Cirrhosis Father    • Heart disease Other    • Diabetes Other        Review of Systems   Constitutional: Negative for decreased appetite and malaise/fatigue.   HENT:  Negative for congestion and sore throat.    Eyes:  Negative for blurred vision, double vision and visual disturbance.   Cardiovascular:  Positive for chest pain and dyspnea on exertion.   Respiratory:  Positive for shortness of breath. Negative for snoring.    Endocrine: Negative for cold intolerance and heat intolerance.   Hematologic/Lymphatic: Negative for adenopathy. Does not bruise/bleed easily.   Skin:  Negative for itching, nail changes and skin cancer.   Musculoskeletal:  Negative for arthritis and myalgias.   Gastrointestinal:  Negative for abdominal pain, dysphagia and heartburn.   Genitourinary:  Negative for bladder incontinence and frequency.   Neurological:  Negative for dizziness, seizures and vertigo.   Psychiatric/Behavioral:  Negative for altered mental status.    Allergic/Immunologic: Negative for environmental allergies and hives.     Diabetes- Yes  Thyroid- normal    Objective    /56   Pulse 75   Ht 177.8 cm (70\")   Wt 83.5 kg (184 lb)   BMI 26.40 kg/m²     Vitals and nursing note reviewed.   Constitutional:       Appearance: Healthy appearance. Not in distress.   Eyes:      Conjunctiva/sclera: " Conjunctivae normal.      Pupils: Pupils are equal, round, and reactive to light.   HENT:      Head: Normocephalic.   Pulmonary:      Effort: Pulmonary effort is normal.      Breath sounds: Normal breath sounds.   Cardiovascular:      PMI at left midclavicular line. Normal rate. Regular rhythm.      Murmurs: There is a grade 3/6 high frequency blowing holosystolic murmur at the apex.   Abdominal:      General: Bowel sounds are normal.      Palpations: Abdomen is soft.      Tenderness:  in the right upper quadrant   Musculoskeletal: Normal range of motion.      Cervical back: Normal range of motion and neck supple. Skin:     General: Skin is warm and dry.   Neurological:      Mental Status: Alert, oriented to person, place, and time and oriented to person, place and time.     ECG 12 Lead    Date/Time: 11/26/2024 3:52 PM  Performed by: Alfonso Anaya MD    Authorized by: Alfonso Anaya MD  Comparison: compared with previous ECG from 11/19/2024  Similar to previous ECG  Rhythm: paced  Rate: normal  QRS axis: normal    Clinical impression: abnormal EKG              @ASSESSMENT/PLAN@        Diagnoses and all orders for this visit:    1. Cardiomyopathy, ischemic (Primary)  -     furosemide (LASIX) 40 MG tablet; Take 1 tablet by mouth Daily.  Dispense: 30 tablet; Refill: 11  -     Comprehensive Metabolic Panel; Future  -     proBNP; Future    2. Essential hypertension  -     Comprehensive Metabolic Panel; Future    3. Hypercholesterolemia  -     Lipid Panel; Future    4. Congestive heart failure with LV diastolic dysfunction, NYHA class 2  -     furosemide (LASIX) 40 MG tablet; Take 1 tablet by mouth Daily.  Dispense: 30 tablet; Refill: 11  -     proBNP; Future    5. Type 2 diabetes mellitus without complication, with long-term current use of insulin  -     Hemoglobin A1c; Future    6. PAF (paroxysmal atrial fibrillation)    7. Shortness of breath    8. Chest pressure  -     CBC & Differential; Future    9.  Cholecystitis  -     CBC & Differential; Future       At baseline her heart rate and blood pressure appears normal.  EKG shows atrial and ventricular pacing.  His clinical examination reveals a BMI around 26.  His cardiovascular examination reveals systolic murmur at the mitral area and 1+ pedal edema    Regarding his ischemic cardiomyopathy his LV function is around 35% he is on Entresto, Farxiga and Aldactone.  He is not on any Lasix at this time.  I advised him to start taking Lasix at 40 mg once a day and advised him to continue the Zaroxolyn is given for 5 days.  He is advised to recheck his electrolytes,    His blood pressure is controlled with the ARB and Bystolic.  Will continue the same    His hypercholesterolemia is controlled with combination of Lipitor, Zetia and Tricor.  Need to check the level again    His congestive heart failure is between class III to class II.  He was in the ER with last 3 and now is in 2.  He was given Lasix which did help him.  I advised him to continue on 40 mg Lasix and check his BNP level.  I also talked to him and his sister about the [facility].  I gave him papers about it and he is going to decide whether he will be interested in getting it    Regarding his diabetes he is on insulin Farxiga and Januvia.  Will check his A1c level again    His shortness of breath is most likely secondary to heart failure.  Hopefully the Lasix should help    His chest pain is almost similar to what he had.  It could be from the gallbladder problem also    Regarding his cholecystitis, he needs surgery.  His risk will be mild to moderate.    I will see him back as the appointment given before.                  Electronically signed by Alfonso Anaya MD November 26, 2024 15:42 EST

## 2024-12-12 ENCOUNTER — DOCUMENTATION (OUTPATIENT)
Dept: CARDIOLOGY | Facility: CLINIC | Age: 69
End: 2024-12-12
Payer: MEDICARE

## 2024-12-12 NOTE — PROGRESS NOTES
Mailed patient [facility] patient brochure.  Dr. Anaya had discussed [facility] with patient at his 11/26/24 office visit.  Patient to look over and decide if he is interested.

## 2025-04-02 ENCOUNTER — OFFICE VISIT (OUTPATIENT)
Dept: CARDIOLOGY | Facility: CLINIC | Age: 70
End: 2025-04-02
Payer: MEDICARE

## 2025-04-02 VITALS
BODY MASS INDEX: 28.37 KG/M2 | HEIGHT: 70 IN | WEIGHT: 198.2 LBS | SYSTOLIC BLOOD PRESSURE: 110 MMHG | DIASTOLIC BLOOD PRESSURE: 62 MMHG | HEART RATE: 91 BPM

## 2025-04-02 DIAGNOSIS — E11.9 TYPE 2 DIABETES MELLITUS WITHOUT COMPLICATION, WITH LONG-TERM CURRENT USE OF INSULIN: ICD-10-CM

## 2025-04-02 DIAGNOSIS — R06.02 SHORTNESS OF BREATH: ICD-10-CM

## 2025-04-02 DIAGNOSIS — I50.30 CONGESTIVE HEART FAILURE WITH LV DIASTOLIC DYSFUNCTION, NYHA CLASS 2: Primary | ICD-10-CM

## 2025-04-02 DIAGNOSIS — R00.2 PALPITATION: ICD-10-CM

## 2025-04-02 DIAGNOSIS — R07.89 CHEST PRESSURE: ICD-10-CM

## 2025-04-02 DIAGNOSIS — I25.5 CARDIOMYOPATHY, ISCHEMIC: ICD-10-CM

## 2025-04-02 DIAGNOSIS — I48.0 PAF (PAROXYSMAL ATRIAL FIBRILLATION): ICD-10-CM

## 2025-04-02 DIAGNOSIS — Z79.4 TYPE 2 DIABETES MELLITUS WITHOUT COMPLICATION, WITH LONG-TERM CURRENT USE OF INSULIN: ICD-10-CM

## 2025-04-02 DIAGNOSIS — K81.9 CHOLECYSTITIS: ICD-10-CM

## 2025-04-02 DIAGNOSIS — I50.30 CONGESTIVE HEART FAILURE WITH LV DIASTOLIC DYSFUNCTION, NYHA CLASS 2: ICD-10-CM

## 2025-04-02 DIAGNOSIS — E78.00 HYPERCHOLESTEROLEMIA: ICD-10-CM

## 2025-04-02 DIAGNOSIS — R25.1 TREMOR: ICD-10-CM

## 2025-04-02 DIAGNOSIS — I10 ESSENTIAL HYPERTENSION: ICD-10-CM

## 2025-04-02 DIAGNOSIS — I25.9 IHD (ISCHEMIC HEART DISEASE): Primary | ICD-10-CM

## 2025-04-02 RX ORDER — ATORVASTATIN CALCIUM 80 MG/1
80 TABLET, FILM COATED ORAL DAILY
Qty: 90 TABLET | Refills: 3 | Status: SHIPPED | OUTPATIENT
Start: 2025-04-02

## 2025-04-02 RX ORDER — SACUBITRIL AND VALSARTAN 24; 26 MG/1; MG/1
1 TABLET, FILM COATED ORAL 2 TIMES DAILY
Qty: 180 TABLET | Refills: 1 | Status: SHIPPED | OUTPATIENT
Start: 2025-04-02

## 2025-04-02 RX ORDER — VERICIGUAT 2.5 MG/1
2.5 TABLET, FILM COATED ORAL DAILY
Qty: 30 TABLET | Refills: 6 | Status: SHIPPED | OUTPATIENT
Start: 2025-04-02

## 2025-04-02 RX ORDER — SPIRONOLACTONE 25 MG/1
25 TABLET ORAL DAILY
Qty: 90 TABLET | Refills: 3 | Status: SHIPPED | OUTPATIENT
Start: 2025-04-02

## 2025-04-02 RX ORDER — NITROGLYCERIN 0.4 MG/1
0.4 TABLET SUBLINGUAL
Qty: 25 TABLET | Refills: 3 | Status: SHIPPED | OUTPATIENT
Start: 2025-04-02

## 2025-04-02 RX ORDER — DAPAGLIFLOZIN 10 MG/1
10 TABLET, FILM COATED ORAL DAILY
Qty: 90 TABLET | Refills: 3 | Status: SHIPPED | OUTPATIENT
Start: 2025-04-02

## 2025-04-02 RX ORDER — EZETIMIBE 10 MG/1
10 TABLET ORAL DAILY
Qty: 90 TABLET | Refills: 3 | Status: SHIPPED | OUTPATIENT
Start: 2025-04-02

## 2025-04-02 RX ORDER — NEBIVOLOL 5 MG/1
5 TABLET ORAL DAILY
Qty: 90 TABLET | Refills: 3 | Status: SHIPPED | OUTPATIENT
Start: 2025-04-02

## 2025-04-02 RX ORDER — FENOFIBRATE 145 MG/1
145 TABLET, COATED ORAL DAILY
Qty: 90 TABLET | Refills: 3 | Status: SHIPPED | OUTPATIENT
Start: 2025-04-02

## 2025-04-02 NOTE — LETTER
April 2, 2025     Deonte Buckner MD  [address] [place] [address] Dr Brenda MONTEZ [id number]    Patient: Adrian Samuel   YOB: 1955   Date of Visit: 4/2/2025     Dear Deonte Buckner MD:       Thank you for referring Adrian Samuel to me for evaluation. Below are the relevant portions of my assessment and plan of care.    If you have questions, please do not hesitate to call me. I look forward to following Adrian along with you.         Sincerely,        Alfonso Anaya MD        CC: No Recipients    Alfonso Anaya MD  04/02/25 1230  Sign when Signing Visit  Chief Complaint   Patient presents with   • Follow-up     Cardiac management   • Lab     Last labs in chart.   • Shortness of Breath     Notices at rest, when bending over, and when walking. Some days it is worse than other days.   • Chest Pain     Will have sharp pain at times in mid chest, reports he has took Nitro sl x2 for relief. Reports pain is not as bad as before yet continues to have sharp pain at times.   • Palpitations     Notices when he becomes tired, relieved with rest.   • Med Refill     Will need refills on cardiac medications-90 day. Patient did not bring medication list, went over medications verbally.       CARDIAC COMPLAINTS  chest pressure/discomfort, dyspnea, and fatigue        Subjective  Adrian Samuel is a 69 y.o. male came in today for his hospital follow-up visit.  He has history of hypertension, diabetes, hypercholesterolemia who also has history of ischemic cardiomyopathy.  He was initially diagnosed with ischemic heart disease in 1993 when he underwent PTCA.  In 1995 he underwent three-vessel bypass surgery.  Since then he has undergone numerous cardiac catheterization.  In 2012 he underwent stenting of the LAD.  He also had biventricular ICD placed in the same year secondary to low EF.  After which he has undergone stenting of the vein graft to RCA in 2013, stenting again in the same SVG in 2015 and again in 2018.  His ICD was replaced  in 2020.  His last stress test in 2024 showed only lateral infarct.  He presented with abdominal discomfort and found to have a cholecystitis.  Cardiac clearance was given and he underwent gallbladder surgery without much complication.  He came today for his regular follow-up visit stating that he has been noticing some sharp chest pain in the mid part of the chest and had taken 2 sublingual nitroglycerin.  He also has been noticing shortness of breath on exertion and occasional palpitation.  His last lab work I have is from number of 2024.  He had his ICD interrogated today and found to have pacing around 98% at the ventricle no VT or A-fib seen.  He unfortunately still continues to chew tobacco.  He also has been having a lot of tremors and felt that it could be Parkinson.  He was put on Sinemet.              Cardiac History  Past Surgical History:   Procedure Laterality Date   • BIVENTRICULLAR IMPLANTABLE CARDIOVERTER DEFIBRILLATOR PLACEMENT  07/06/2020    St. Cam replacement by Dr. Fuentes   • CARDIAC CATHETERIZATION  1993    Cath-PTCA.   • CARDIAC CATHETERIZATION  02/08/2005    Cath-() Patent Grafts   • CARDIAC CATHETERIZATION  08/01/2005    Cath-(Dr. Sigala) Patent Grafts.   • CARDIAC CATHETERIZATION  09/18/2007    Cath-100%LAD, 80%D2. 100%LCX, RCA. Patent Lima & SVG to PDA.   • CARDIAC CATHETERIZATION  08/22/2006    Cath-Patent Grafts   • CARDIAC CATHETERIZATION  01/15/2010    Cath-Patent LIMA & SVG to PDA. 80% OM1-2.25 x 16mm Taxus Stent   • CARDIAC CATHETERIZATION  02/14/2012    Cath-IVUS LAD 60-70%, 2.55 x 20mm Promus stent Ramus   • CARDIAC CATHETERIZATION  03/12/2013    Cath-3.5 x 28Promus stent SVG to RCA.   • CARDIAC CATHETERIZATION  11/25/2013    Cath-(Dr. Sigala) EF30-35%, occluded circ and graft but increase collaterol flow manage medically.   • CARDIAC CATHETERIZATION  12/22/2014    Cath-85% SVG to PDA-   • CARDIAC CATHETERIZATION  03/18/2015    Cath-90%LIMA to LAD-80% SVG to PDA-2.75  x 8 Resolute   • CARDIAC CATHETERIZATION  05/29/2018    80% SVG to PDA- 3.5x30 Resolute Stent.   • CARDIAC DEFIBRILLATOR PLACEMENT  2012    BiV/ICD, [facility]   • CARDIOVASCULAR STRESS TEST  02/19/2008    D.Myoview-75%THR. EF 43%. InferoLateral Infarct with Nba-Infarct Ischemia   • CARDIOVASCULAR STRESS TEST  05/08/2009    D.Myoview-78%THR. EF48%. Inferiolateral wall infarct and nba-infarct ischemia.   • CARDIOVASCULAR STRESS TEST  03/03/2010    L.Myoview-Lateral Ischemia   • CARDIOVASCULAR STRESS TEST  02/13/2013    L.Myoview-lateral wall ischemia   • CARDIOVASCULAR STRESS TEST  11/11/2013    L.Myoview-lateral wall ischemia   • CARDIOVASCULAR STRESS TEST  11/18/2014    L.Myoview-([facility]) EF. Inferolateral Infarct   • CARDIOVASCULAR STRESS TEST  03/17/2015    L.Myoview-Sig Anterior Changes   • CARDIOVASCULAR STRESS TEST  03/15/2017    Lexiscan- lateral wall ischemia, nitrodur increased, cath if symptoms persist   • CARDIOVASCULAR STRESS TEST  11/13/2018    L. Cardiolite- EF 34%. Inferolateral Infarct with periinfarct Ischemia.   • CARDIOVASCULAR STRESS TEST  08/23/2021    Lexiscan- EF 34%. Lateral Ischemia   • CARDIOVASCULAR STRESS TEST  04/08/2024    Lexiscan- EF 25%. Lateral Infarct   • CONVERTED (HISTORICAL) CARDIOVASCULAR STUDIES  09/05/2012    MUGA-RVEF 41%, LVEF 38%   • CONVERTED (HISTORICAL) HOLTER  06/11/2006    Holter-PVC's noted   • CORONARY ARTERY BYPASS GRAFT  1995    CABG LIMA TO LAD, SVG TO OM and SVG to PDA.   • ECHO - CONVERTED  08/29/2007    Echo-EF50-55%, mild to mod. MR, mildly hypokinetic septum.   • ECHO - CONVERTED  05/08/2009    Echo-EF45-49%, Moderate MR and PA pressure-42 mmHg.   • ECHO - CONVERTED  01/18/2012    Echo-EF 25-30%   • ECHO - CONVERTED  05/17/2012    Echo-EF<30%. AnteroSpectal WMA.   • ECHO - CONVERTED  09/02/2015    Echo-EF 30-35%, mod to severe MR   • ECHO - CONVERTED  03/15/2017    EF 35-40%, RVSP 25-30 mmHg   • ECHO - CONVERTED  11/13/2018    EF 35%. Inferolateral WMA. Mild- Mod  MR. RVSP- 30 mmHg   • ECHO - CONVERTED  08/23/2021    EF 35%. Lateral WMA. LA- 4.7 Cm. Trace MR & AI. RVSP- 28 mmHg   • ECHO - CONVERTED  02/08/2024    Dil LV.EF 35%. Lateral WMA. LA- 4.3. Mild-Mod MR. RVSP- 43 mmHg   • ECHO - CONVERTED  11/01/2024    @ [facility].- EF 35%. Mild-Mod MR. RVSP- 49 mmHg   • OTHER SURGICAL HISTORY  02/28/2014    U/S Aorta-   • US CAROTID UNILATERAL  05/21/2015    Carotid US-no hemodyanamically significant stenosis   • US CAROTID UNILATERAL  04/10/1989    Carotid US-No sig. stenosis noted       Current Outpatient Medications   Medication Sig Dispense Refill   • apixaban (ELIQUIS) 5 MG tablet tablet Take 1 tablet by mouth 2 (Two) Times a Day. 60 tablet 8   • atorvastatin (LIPITOR) 80 MG tablet Take 1 tablet by mouth Daily. 90 tablet 3   • baclofen (LIORESAL) 10 MG tablet Take 1 tablet by mouth 2 (Two) Times a Day.     • carbidopa-levodopa (SINEMET)  MG per tablet Take 1 tablet by mouth 2 (Two) Times a Day.     • cetirizine (ZyrTEC) 10 MG tablet Take 1 tablet by mouth Daily.     • citalopram (CeleXA) 40 MG tablet Take 1 tablet by mouth Daily.     • cycloSPORINE (RESTASIS) 0.05 % ophthalmic emulsion TWICE DAILY     • dicyclomine (BENTYL) 10 MG capsule Take 1 capsule by mouth 2 (Two) Times a Day With Meals. 60 capsule 3   • ezetimibe (ZETIA) 10 MG tablet Take 1 tablet by mouth Daily. 90 tablet 3   • Farxiga 10 MG tablet Take 10 mg by mouth Daily. 90 tablet 3   • fenofibrate (TRICOR) 145 MG tablet Take 1 tablet by mouth Daily. 90 tablet 3   • HYDROcodone-acetaminophen (NORCO)  MG per tablet Take 1 tablet by mouth 3 times a day.     • Insulin Aspart Prot & Aspart (NOVOLOG MIX 70/30 PENFILL SC) Inject 65 Units under the skin into the appropriate area as directed 2 (Two) Times a Day With Meals.     • Insulin Glargine, 1 Unit Dial, (Toujeo SoloStar) 300 UNIT/ML solution pen-injector injection Inject  under the skin into the appropriate area as directed.     • lansoprazole  "(Prevacid) 30 MG capsule Take 1 capsule by mouth Daily. 90 capsule 3   • linaclotide (LINZESS) 145 MCG capsule capsule Take 1 capsule by mouth Daily As Needed.     • nebivolol (BYSTOLIC) 5 MG tablet Take 1 tablet by mouth Daily. 90 tablet 3   • nitroglycerin (NITROSTAT) 0.4 MG SL tablet Place 1 tablet under the tongue Every 5 (Five) Minutes As Needed for Chest Pain. Take no more than 3 doses in 15 minutes. 25 tablet 3   • O2 (OXYGEN) Inhale 2 L/min at night as needed.     • ONE TOUCH ULTRA TEST test strip   0   • ONETOUCH DELICA LANCETS 33G misc   0   • sacubitril-valsartan (Entresto) 24-26 MG tablet Take 1 tablet by mouth 2 (Two) Times a Day. 180 tablet 1   • SITagliptin (JANUVIA) 100 MG tablet Take 1 tablet by mouth Daily.     • spironolactone (ALDACTONE) 25 MG tablet Take 1 tablet by mouth Daily. 90 tablet 3   • SURE COMFORT INSULIN SYRINGE 31G X 5/16\" 1 ML misc   6   • ticagrelor (BRILINTA) 60 MG tablet tablet Take 1 tablet by mouth 2 (Two) Times a Day. 180 tablet 3   • metOLazone (ZAROXOLYN) 2.5 MG tablet Take 1 tablet by mouth Daily for 7 days. 7 tablet 0   • Vericiguat (Verquvo) 2.5 MG tablet Take 1 tablet by mouth Daily. 30 tablet 6     No current facility-administered medications for this visit.       Allergies  :  Effient [prasugrel], Imdur [isosorbide dinitrate], Livalo [pitavastatin], Ranolazine, and Repatha [evolocumab]       Past Medical History:   Diagnosis Date   • Disease of thyroid gland 04/10/2009    small colloid cyst right thyroid   • Esophageal dilatation     Followed by Dr. Kyle   • Heel spurs removed    • History of left hip replacement 2023   • Hx of cholecystectomy    • Hypercholesterolemia    • Hypertension     Systemic   • IHD (ischemic heart disease)    • Other abnormal cytological findings on specimens from anus 2006 & 2007    [facility]   • Pain     Chronic       Social History     Socioeconomic History   • Marital status:    Tobacco Use   • Smoking status: Former   • Smokeless " "tobacco: Current     Types: Chew   • Tobacco comments:     11/24/2015   Vaping Use   • Vaping status: Never Used   Substance and Sexual Activity   • Alcohol use: No   • Drug use: Defer   • Sexual activity: Defer       Family History   Problem Relation Age of Onset   • Heart disease Mother    • Stroke Mother    • Diabetes Mother    • Diabetes Father    • Cirrhosis Father    • Heart disease Other    • Diabetes Other        Review of Systems   Constitutional: Negative for decreased appetite and malaise/fatigue.   HENT:  Negative for congestion and sore throat.    Eyes:  Negative for blurred vision, double vision and visual disturbance.   Cardiovascular:  Positive for chest pain and dyspnea on exertion. Negative for palpitations.   Respiratory:  Positive for shortness of breath. Negative for snoring.    Endocrine: Negative for cold intolerance and heat intolerance.   Hematologic/Lymphatic: Negative for adenopathy. Does not bruise/bleed easily.   Skin:  Negative for itching, nail changes and skin cancer.   Musculoskeletal:  Negative for arthritis and myalgias.   Gastrointestinal:  Negative for abdominal pain, dysphagia and heartburn.   Genitourinary:  Negative for bladder incontinence and frequency.   Neurological:  Negative for dizziness, seizures and vertigo.   Psychiatric/Behavioral:  Negative for altered mental status.    Allergic/Immunologic: Negative for environmental allergies and hives.     Diabetes- Yes  Thyroid- normal    Objective    /62 (BP Location: Right arm, Patient Position: Sitting)   Pulse 91   Ht 177.8 cm (70\")   Wt 89.9 kg (198 lb 3.2 oz)   BMI 28.44 kg/m²     Vitals and nursing note reviewed.   Constitutional:       Appearance: Healthy appearance. Not in distress.   Eyes:      Conjunctiva/sclera: Conjunctivae normal.      Pupils: Pupils are equal, round, and reactive to light.   HENT:      Head: Normocephalic.   Pulmonary:      Effort: Pulmonary effort is normal.      Breath sounds: Normal " breath sounds.   Cardiovascular:      PMI at left midclavicular line. Normal rate. Regular rhythm.      Murmurs: There is a high frequency blowing holosystolic murmur at the apex.   Abdominal:      General: Bowel sounds are normal.      Palpations: Abdomen is soft.   Musculoskeletal: Normal range of motion.      Cervical back: Normal range of motion and neck supple. Skin:     General: Skin is warm and dry.   Neurological:      Mental Status: Alert, oriented to person, place, and time and oriented to person, place and time.     ECG 12 Lead    Date/Time: 4/2/2025 12:30 PM  Performed by: Alfonso Anaya MD    Authorized by: Alfonos Anaya MD  Comparison: compared with previous ECG from 11/26/2024  Similar to previous ECG  Rhythm: paced  Rate: normal  QRS axis: normal    Clinical impression: abnormal EKG              @ASSESSMENT/PLAN@        Diagnoses and all orders for this visit:    1. IHD (ischemic heart disease) (Primary)  -     apixaban (ELIQUIS) 5 MG tablet tablet; Take 1 tablet by mouth 2 (Two) Times a Day.  Dispense: 60 tablet; Refill: 8  -     ticagrelor (BRILINTA) 60 MG tablet tablet; Take 1 tablet by mouth 2 (Two) Times a Day.  Dispense: 180 tablet; Refill: 3  -     nitroglycerin (NITROSTAT) 0.4 MG SL tablet; Place 1 tablet under the tongue Every 5 (Five) Minutes As Needed for Chest Pain. Take no more than 3 doses in 15 minutes.  Dispense: 25 tablet; Refill: 3  -     Stress Test With Myocardial Perfusion One Day; Future    2. Cardiomyopathy, ischemic  -     Adult Transthoracic Echo Complete W/ Cont if Necessary Per Protocol; Future  -     Vericiguat (Verquvo) 2.5 MG tablet; Take 1 tablet by mouth Daily.  Dispense: 30 tablet; Refill: 6    3. Congestive heart failure with LV diastolic dysfunction, NYHA class 2  -     Farxiga 10 MG tablet; Take 10 mg by mouth Daily.  Dispense: 90 tablet; Refill: 3  -     sacubitril-valsartan (Entresto) 24-26 MG tablet; Take 1 tablet by mouth 2 (Two) Times a Day.   Dispense: 180 tablet; Refill: 1  -     spironolactone (ALDACTONE) 25 MG tablet; Take 1 tablet by mouth Daily.  Dispense: 90 tablet; Refill: 3  -     Vericiguat (Verquvo) 2.5 MG tablet; Take 1 tablet by mouth Daily.  Dispense: 30 tablet; Refill: 6    4. Essential hypertension  -     nebivolol (BYSTOLIC) 5 MG tablet; Take 1 tablet by mouth Daily.  Dispense: 90 tablet; Refill: 3  -     Comprehensive Metabolic Panel; Future    5. Hypercholesterolemia  -     atorvastatin (LIPITOR) 80 MG tablet; Take 1 tablet by mouth Daily.  Dispense: 90 tablet; Refill: 3  -     ezetimibe (ZETIA) 10 MG tablet; Take 1 tablet by mouth Daily.  Dispense: 90 tablet; Refill: 3  -     fenofibrate (TRICOR) 145 MG tablet; Take 1 tablet by mouth Daily.  Dispense: 90 tablet; Refill: 3  -     Lipid Panel; Future    6. PAF (paroxysmal atrial fibrillation)    7. Type 2 diabetes mellitus without complication, with long-term current use of insulin  -     Hemoglobin A1c; Future  -     Magnesium; Future  -     TSH; Future    8. Cholecystitis    9. Shortness of breath  -     Adult Transthoracic Echo Complete W/ Cont if Necessary Per Protocol; Future  -     proBNP; Future    10. Chest pressure  -     apixaban (ELIQUIS) 5 MG tablet tablet; Take 1 tablet by mouth 2 (Two) Times a Day.  Dispense: 60 tablet; Refill: 8  -     Stress Test With Myocardial Perfusion One Day; Future  -     CBC & Differential; Future    11. Palpitation    12. Tremor    At baseline his heart rate is upper limit of normal.  His blood pressure is stable.  His EKG shows atrial sensing and ventricular pacing.  His clinical examination reveals a BMI of 28.  His cardiovascular examination reveals slightly loud second heart sound and 3/6 systolic murmur at the mitral area.    Regarding ischemic cardiomyopathy with congestive heart failure, he has received IV Lasix while he was in the hospital.  He did develop some heart failure symptoms.  Will continue Entresto, Aldactone and Farxiga.  I did  add Verquvo at 2.5 mg once a day and gradually increase    His blood pressure is controlled with a combination of Bystolic and Entresto.  Continue the same and have the electrolytes checked along with renal function and electrolytes    His hypercholesterolemia is being managed with Lipitor, Zetia and Tricor.  Continue the same.  Check the LFT and lipid profile    Regarding his PAF, he is on Eliquis and is ICD interrogation showed no A-fib in the last 6 months.    His diabetes is being managed with insulin, Januvia and also Farxiga which can be used for heart failure also    His cholecystitis has been managed after undergoing cholecystectomy without any complications.    Shortness of breath is most likely secondary to heart failure.  Will repeat the echocardiogram to reevaluate the EF and valvular structures    Regarding the chest pressure, like to repeat his stress test to rule out any worsening of ischemia    His palpitation seems to be getting better with the beta-blockers    His tremors is getting better with Sinemet    Talk to him about living will and power of  and gave him booklet regarding that    He has ischemic heart disease and has undergone multiple stenting after the bypass surgery.  He is now started having some chest tightness again.  Continue his Brilinta along with nitroglycerin.  I scheduled him to undergo stress test in the form of Lexiscan to rule out ischemia causing the chest pain.                    Electronically signed by Alfonso Anaya MD April 2, 2025 12:19 EDT

## 2025-04-02 NOTE — PROGRESS NOTES
Chief Complaint   Patient presents with   • Follow-up     Cardiac management   • Lab     Last labs in chart.   • Shortness of Breath     Notices at rest, when bending over, and when walking. Some days it is worse than other days.   • Chest Pain     Will have sharp pain at times in mid chest, reports he has took Nitro sl x2 for relief. Reports pain is not as bad as before yet continues to have sharp pain at times.   • Palpitations     Notices when he becomes tired, relieved with rest.   • Med Refill     Will need refills on cardiac medications-90 day. Patient did not bring medication list, went over medications verbally.       CARDIAC COMPLAINTS  chest pressure/discomfort, dyspnea, and fatigue        Subjective   Adrian Samuel is a 69 y.o. male came in today for his hospital follow-up visit.  He has history of hypertension, diabetes, hypercholesterolemia who also has history of ischemic cardiomyopathy.  He was initially diagnosed with ischemic heart disease in 1993 when he underwent PTCA.  In 1995 he underwent three-vessel bypass surgery.  Since then he has undergone numerous cardiac catheterization.  In 2012 he underwent stenting of the LAD.  He also had biventricular ICD placed in the same year secondary to low EF.  After which he has undergone stenting of the vein graft to RCA in 2013, stenting again in the same SVG in 2015 and again in 2018.  His ICD was replaced in 2020.  His last stress test in 2024 showed only lateral infarct.  He presented with abdominal discomfort and found to have a cholecystitis.  Cardiac clearance was given and he underwent gallbladder surgery without much complication.  He came today for his regular follow-up visit stating that he has been noticing some sharp chest pain in the mid part of the chest and had taken 2 sublingual nitroglycerin.  He also has been noticing shortness of breath on exertion and occasional palpitation.  His last lab work I have is from number of 2024.  He had his  ICD interrogated today and found to have pacing around 98% at the ventricle no VT or A-fib seen.  He unfortunately still continues to chew tobacco.  He also has been having a lot of tremors and felt that it could be Parkinson.  He was put on Sinemet.              Cardiac History  Past Surgical History:   Procedure Laterality Date   • BIVENTRICULLAR IMPLANTABLE CARDIOVERTER DEFIBRILLATOR PLACEMENT  07/06/2020    St. Cam replacement by Dr. Fuentes   • CARDIAC CATHETERIZATION  1993    Cath-PTCA.   • CARDIAC CATHETERIZATION  02/08/2005    Cath-() Patent Grafts   • CARDIAC CATHETERIZATION  08/01/2005    Cath-(Dr. Sigala) Patent Grafts.   • CARDIAC CATHETERIZATION  09/18/2007    Cath-100%LAD, 80%D2. 100%LCX, RCA. Patent Lima & SVG to PDA.   • CARDIAC CATHETERIZATION  08/22/2006    Cath-Patent Grafts   • CARDIAC CATHETERIZATION  01/15/2010    Cath-Patent LIMA & SVG to PDA. 80% OM1-2.25 x 16mm Taxus Stent   • CARDIAC CATHETERIZATION  02/14/2012    Cath-IVUS LAD 60-70%, 2.55 x 20mm Promus stent Ramus   • CARDIAC CATHETERIZATION  03/12/2013    Cath-3.5 x 28Promus stent SVG to RCA.   • CARDIAC CATHETERIZATION  11/25/2013    Cath-(Dr. Sigala) EF30-35%, occluded circ and graft but increase collaterol flow manage medically.   • CARDIAC CATHETERIZATION  12/22/2014    Cath-85% SVG to PDA-   • CARDIAC CATHETERIZATION  03/18/2015    Cath-90%LIMA to LAD-80% SVG to PDA-2.75 x 8 Resolute   • CARDIAC CATHETERIZATION  05/29/2018    80% SVG to PDA- 3.5x30 Resolute Stent.   • CARDIAC DEFIBRILLATOR PLACEMENT  2012    BiV/ICD, MENG   • CARDIOVASCULAR STRESS TEST  02/19/2008    D.Myoview-75%THR. EF 43%. InferoLateral Infarct with Nba-Infarct Ischemia   • CARDIOVASCULAR STRESS TEST  05/08/2009    D.Myoview-78%THR. EF48%. Inferiolateral wall infarct and nba-infarct ischemia.   • CARDIOVASCULAR STRESS TEST  03/03/2010    L.Myoview-Lateral Ischemia   • CARDIOVASCULAR STRESS TEST  02/13/2013    L.Myoview-lateral wall ischemia   •  CARDIOVASCULAR STRESS TEST  11/11/2013    L.Myoview-lateral wall ischemia   • CARDIOVASCULAR STRESS TEST  11/18/2014    L.Myoview-([facility]) EF. Inferolateral Infarct   • CARDIOVASCULAR STRESS TEST  03/17/2015    L.Myoview-Sig Anterior Changes   • CARDIOVASCULAR STRESS TEST  03/15/2017    Lexiscan- lateral wall ischemia, nitrodur increased, cath if symptoms persist   • CARDIOVASCULAR STRESS TEST  11/13/2018    L. Cardiolite- EF 34%. Inferolateral Infarct with periinfarct Ischemia.   • CARDIOVASCULAR STRESS TEST  08/23/2021    Lexiscan- EF 34%. Lateral Ischemia   • CARDIOVASCULAR STRESS TEST  04/08/2024    Lexiscan- EF 25%. Lateral Infarct   • CONVERTED (HISTORICAL) CARDIOVASCULAR STUDIES  09/05/2012    MUGA-RVEF 41%, LVEF 38%   • CONVERTED (HISTORICAL) HOLTER  06/11/2006    Holter-PVC's noted   • CORONARY ARTERY BYPASS GRAFT  1995    CABG LIMA TO LAD, SVG TO OM and SVG to PDA.   • ECHO - CONVERTED  08/29/2007    Echo-EF50-55%, mild to mod. MR, mildly hypokinetic septum.   • ECHO - CONVERTED  05/08/2009    Echo-EF45-49%, Moderate MR and PA pressure-42 mmHg.   • ECHO - CONVERTED  01/18/2012    Echo-EF 25-30%   • ECHO - CONVERTED  05/17/2012    Echo-EF<30%. AnteroSpectal WMA.   • ECHO - CONVERTED  09/02/2015    Echo-EF 30-35%, mod to severe MR   • ECHO - CONVERTED  03/15/2017    EF 35-40%, RVSP 25-30 mmHg   • ECHO - CONVERTED  11/13/2018    EF 35%. Inferolateral WMA. Mild- Mod MR. RVSP- 30 mmHg   • ECHO - CONVERTED  08/23/2021    EF 35%. Lateral WMA. LA- 4.7 Cm. Trace MR & AI. RVSP- 28 mmHg   • ECHO - CONVERTED  02/08/2024    Dil LV.EF 35%. Lateral WMA. LA- 4.3. Mild-Mod MR. RVSP- 43 mmHg   • ECHO - CONVERTED  11/01/2024    @ [facility].- EF 35%. Mild-Mod MR. RVSP- 49 mmHg   • OTHER SURGICAL HISTORY  02/28/2014    U/S Aorta-   • US CAROTID UNILATERAL  05/21/2015    Carotid US-no hemodyanamically significant stenosis   • US CAROTID UNILATERAL  04/10/1989    Carotid US-No sig. stenosis noted       Current Outpatient  Medications   Medication Sig Dispense Refill   • apixaban (ELIQUIS) 5 MG tablet tablet Take 1 tablet by mouth 2 (Two) Times a Day. 60 tablet 8   • atorvastatin (LIPITOR) 80 MG tablet Take 1 tablet by mouth Daily. 90 tablet 3   • baclofen (LIORESAL) 10 MG tablet Take 1 tablet by mouth 2 (Two) Times a Day.     • carbidopa-levodopa (SINEMET)  MG per tablet Take 1 tablet by mouth 2 (Two) Times a Day.     • cetirizine (ZyrTEC) 10 MG tablet Take 1 tablet by mouth Daily.     • citalopram (CeleXA) 40 MG tablet Take 1 tablet by mouth Daily.     • cycloSPORINE (RESTASIS) 0.05 % ophthalmic emulsion TWICE DAILY     • dicyclomine (BENTYL) 10 MG capsule Take 1 capsule by mouth 2 (Two) Times a Day With Meals. 60 capsule 3   • ezetimibe (ZETIA) 10 MG tablet Take 1 tablet by mouth Daily. 90 tablet 3   • Farxiga 10 MG tablet Take 10 mg by mouth Daily. 90 tablet 3   • fenofibrate (TRICOR) 145 MG tablet Take 1 tablet by mouth Daily. 90 tablet 3   • HYDROcodone-acetaminophen (NORCO)  MG per tablet Take 1 tablet by mouth 3 times a day.     • Insulin Aspart Prot & Aspart (NOVOLOG MIX 70/30 PENFILL SC) Inject 65 Units under the skin into the appropriate area as directed 2 (Two) Times a Day With Meals.     • Insulin Glargine, 1 Unit Dial, (Toujeo SoloStar) 300 UNIT/ML solution pen-injector injection Inject  under the skin into the appropriate area as directed.     • lansoprazole (Prevacid) 30 MG capsule Take 1 capsule by mouth Daily. 90 capsule 3   • linaclotide (LINZESS) 145 MCG capsule capsule Take 1 capsule by mouth Daily As Needed.     • nebivolol (BYSTOLIC) 5 MG tablet Take 1 tablet by mouth Daily. 90 tablet 3   • nitroglycerin (NITROSTAT) 0.4 MG SL tablet Place 1 tablet under the tongue Every 5 (Five) Minutes As Needed for Chest Pain. Take no more than 3 doses in 15 minutes. 25 tablet 3   • O2 (OXYGEN) Inhale 2 L/min at night as needed.     • ONE TOUCH ULTRA TEST test strip   0   • ONETOUCH DELICA LANCETS 33G misc   0   •  "sacubitril-valsartan (Entresto) 24-26 MG tablet Take 1 tablet by mouth 2 (Two) Times a Day. 180 tablet 1   • SITagliptin (JANUVIA) 100 MG tablet Take 1 tablet by mouth Daily.     • spironolactone (ALDACTONE) 25 MG tablet Take 1 tablet by mouth Daily. 90 tablet 3   • SURE COMFORT INSULIN SYRINGE 31G X 5/16\" 1 ML misc   6   • ticagrelor (BRILINTA) 60 MG tablet tablet Take 1 tablet by mouth 2 (Two) Times a Day. 180 tablet 3   • metOLazone (ZAROXOLYN) 2.5 MG tablet Take 1 tablet by mouth Daily for 7 days. 7 tablet 0   • Vericiguat (Verquvo) 2.5 MG tablet Take 1 tablet by mouth Daily. 30 tablet 6     No current facility-administered medications for this visit.       Allergies  :  Effient [prasugrel], Imdur [isosorbide dinitrate], Livalo [pitavastatin], Ranolazine, and Repatha [evolocumab]       Past Medical History:   Diagnosis Date   • Disease of thyroid gland 04/10/2009    small colloid cyst right thyroid   • Esophageal dilatation     Followed by Dr. Kyle   • Heel spurs removed    • History of left hip replacement 2023   • Hx of cholecystectomy    • Hypercholesterolemia    • Hypertension     Systemic   • IHD (ischemic heart disease)    • Other abnormal cytological findings on specimens from anus 2006 & 2007    [facility]   • Pain     Chronic       Social History     Socioeconomic History   • Marital status:    Tobacco Use   • Smoking status: Former   • Smokeless tobacco: Current     Types: Chew   • Tobacco comments:     11/24/2015   Vaping Use   • Vaping status: Never Used   Substance and Sexual Activity   • Alcohol use: No   • Drug use: Defer   • Sexual activity: Defer       Family History   Problem Relation Age of Onset   • Heart disease Mother    • Stroke Mother    • Diabetes Mother    • Diabetes Father    • Cirrhosis Father    • Heart disease Other    • Diabetes Other        Review of Systems   Constitutional: Negative for decreased appetite and malaise/fatigue.   HENT:  Negative for congestion and sore throat.  " "  Eyes:  Negative for blurred vision, double vision and visual disturbance.   Cardiovascular:  Positive for chest pain and dyspnea on exertion. Negative for palpitations.   Respiratory:  Positive for shortness of breath. Negative for snoring.    Endocrine: Negative for cold intolerance and heat intolerance.   Hematologic/Lymphatic: Negative for adenopathy. Does not bruise/bleed easily.   Skin:  Negative for itching, nail changes and skin cancer.   Musculoskeletal:  Negative for arthritis and myalgias.   Gastrointestinal:  Negative for abdominal pain, dysphagia and heartburn.   Genitourinary:  Negative for bladder incontinence and frequency.   Neurological:  Negative for dizziness, seizures and vertigo.   Psychiatric/Behavioral:  Negative for altered mental status.    Allergic/Immunologic: Negative for environmental allergies and hives.     Diabetes- Yes  Thyroid- normal    Objective     /62 (BP Location: Right arm, Patient Position: Sitting)   Pulse 91   Ht 177.8 cm (70\")   Wt 89.9 kg (198 lb 3.2 oz)   BMI 28.44 kg/m²     Vitals and nursing note reviewed.   Constitutional:       Appearance: Healthy appearance. Not in distress.   Eyes:      Conjunctiva/sclera: Conjunctivae normal.      Pupils: Pupils are equal, round, and reactive to light.   HENT:      Head: Normocephalic.   Pulmonary:      Effort: Pulmonary effort is normal.      Breath sounds: Normal breath sounds.   Cardiovascular:      PMI at left midclavicular line. Normal rate. Regular rhythm.      Murmurs: There is a high frequency blowing holosystolic murmur at the apex.   Abdominal:      General: Bowel sounds are normal.      Palpations: Abdomen is soft.   Musculoskeletal: Normal range of motion.      Cervical back: Normal range of motion and neck supple. Skin:     General: Skin is warm and dry.   Neurological:      Mental Status: Alert, oriented to person, place, and time and oriented to person, place and time.     ECG 12 Lead    Date/Time: " 4/2/2025 12:30 PM  Performed by: Alfonso Anaya MD    Authorized by: Alfonso Anaya MD  Comparison: compared with previous ECG from 11/26/2024  Similar to previous ECG  Rhythm: paced  Rate: normal  QRS axis: normal    Clinical impression: abnormal EKG              @ASSESSMENT/PLAN@        Diagnoses and all orders for this visit:    1. IHD (ischemic heart disease) (Primary)  -     apixaban (ELIQUIS) 5 MG tablet tablet; Take 1 tablet by mouth 2 (Two) Times a Day.  Dispense: 60 tablet; Refill: 8  -     ticagrelor (BRILINTA) 60 MG tablet tablet; Take 1 tablet by mouth 2 (Two) Times a Day.  Dispense: 180 tablet; Refill: 3  -     nitroglycerin (NITROSTAT) 0.4 MG SL tablet; Place 1 tablet under the tongue Every 5 (Five) Minutes As Needed for Chest Pain. Take no more than 3 doses in 15 minutes.  Dispense: 25 tablet; Refill: 3  -     Stress Test With Myocardial Perfusion One Day; Future    2. Cardiomyopathy, ischemic  -     Adult Transthoracic Echo Complete W/ Cont if Necessary Per Protocol; Future  -     Vericiguat (Verquvo) 2.5 MG tablet; Take 1 tablet by mouth Daily.  Dispense: 30 tablet; Refill: 6    3. Congestive heart failure with LV diastolic dysfunction, NYHA class 2  -     Farxiga 10 MG tablet; Take 10 mg by mouth Daily.  Dispense: 90 tablet; Refill: 3  -     sacubitril-valsartan (Entresto) 24-26 MG tablet; Take 1 tablet by mouth 2 (Two) Times a Day.  Dispense: 180 tablet; Refill: 1  -     spironolactone (ALDACTONE) 25 MG tablet; Take 1 tablet by mouth Daily.  Dispense: 90 tablet; Refill: 3  -     Vericiguat (Verquvo) 2.5 MG tablet; Take 1 tablet by mouth Daily.  Dispense: 30 tablet; Refill: 6    4. Essential hypertension  -     nebivolol (BYSTOLIC) 5 MG tablet; Take 1 tablet by mouth Daily.  Dispense: 90 tablet; Refill: 3  -     Comprehensive Metabolic Panel; Future    5. Hypercholesterolemia  -     atorvastatin (LIPITOR) 80 MG tablet; Take 1 tablet by mouth Daily.  Dispense: 90 tablet; Refill: 3  -      ezetimibe (ZETIA) 10 MG tablet; Take 1 tablet by mouth Daily.  Dispense: 90 tablet; Refill: 3  -     fenofibrate (TRICOR) 145 MG tablet; Take 1 tablet by mouth Daily.  Dispense: 90 tablet; Refill: 3  -     Lipid Panel; Future    6. PAF (paroxysmal atrial fibrillation)    7. Type 2 diabetes mellitus without complication, with long-term current use of insulin  -     Hemoglobin A1c; Future  -     Magnesium; Future  -     TSH; Future    8. Cholecystitis    9. Shortness of breath  -     Adult Transthoracic Echo Complete W/ Cont if Necessary Per Protocol; Future  -     proBNP; Future    10. Chest pressure  -     apixaban (ELIQUIS) 5 MG tablet tablet; Take 1 tablet by mouth 2 (Two) Times a Day.  Dispense: 60 tablet; Refill: 8  -     Stress Test With Myocardial Perfusion One Day; Future  -     CBC & Differential; Future    11. Palpitation    12. Tremor    At baseline his heart rate is upper limit of normal.  His blood pressure is stable.  His EKG shows atrial sensing and ventricular pacing.  His clinical examination reveals a BMI of 28.  His cardiovascular examination reveals slightly loud second heart sound and 3/6 systolic murmur at the mitral area.    Regarding ischemic cardiomyopathy with congestive heart failure, he has received IV Lasix while he was in the hospital.  He did develop some heart failure symptoms.  Will continue Entresto, Aldactone and Farxiga.  I did add Verquvo at 2.5 mg once a day and gradually increase    His blood pressure is controlled with a combination of Bystolic and Entresto.  Continue the same and have the electrolytes checked along with renal function and electrolytes    His hypercholesterolemia is being managed with Lipitor, Zetia and Tricor.  Continue the same.  Check the LFT and lipid profile    Regarding his PAF, he is on Eliquis and is ICD interrogation showed no A-fib in the last 6 months.    His diabetes is being managed with insulin, Januvia and also Farxiga which can be used for heart  failure also    His cholecystitis has been managed after undergoing cholecystectomy without any complications.    Shortness of breath is most likely secondary to heart failure.  Will repeat the echocardiogram to reevaluate the EF and valvular structures    Regarding the chest pressure, like to repeat his stress test to rule out any worsening of ischemia    His palpitation seems to be getting better with the beta-blockers    His tremors is getting better with Sinemet    Talk to him about living will and power of  and gave him booklet regarding that    He has ischemic heart disease and has undergone multiple stenting after the bypass surgery.  He is now started having some chest tightness again.  Continue his Brilinta along with nitroglycerin.  I scheduled him to undergo stress test in the form of Lexiscan to rule out ischemia causing the chest pain.                    Electronically signed by Alfonso Anaya MD April 2, 2025 12:19 EDT

## 2025-04-04 ENCOUNTER — PRIOR AUTHORIZATION (OUTPATIENT)
Dept: CARDIOLOGY | Facility: CLINIC | Age: 70
End: 2025-04-04
Payer: MEDICARE

## 2025-04-04 NOTE — TELEPHONE ENCOUNTER
PA was completed through Cover[employer]Meds for Verquvo and it was approved.    PA Case: [id number]  Valid from 01/01/25 to 12/31/25

## 2025-04-16 ENCOUNTER — HOSPITAL ENCOUNTER (OUTPATIENT)
Dept: CARDIOLOGY | Facility: HOSPITAL | Age: 70
Discharge: HOME | End: 2025-04-16
Payer: MEDICARE

## 2025-04-16 ENCOUNTER — LAB (OUTPATIENT)
Dept: LAB | Facility: HOSPITAL | Age: 70
End: 2025-04-16
Payer: MEDICARE

## 2025-04-16 DIAGNOSIS — I25.9 IHD (ISCHEMIC HEART DISEASE): ICD-10-CM

## 2025-04-16 DIAGNOSIS — E11.9 TYPE 2 DIABETES MELLITUS WITHOUT COMPLICATION, WITH LONG-TERM CURRENT USE OF INSULIN: ICD-10-CM

## 2025-04-16 DIAGNOSIS — Z79.4 TYPE 2 DIABETES MELLITUS WITHOUT COMPLICATION, WITH LONG-TERM CURRENT USE OF INSULIN: ICD-10-CM

## 2025-04-16 DIAGNOSIS — R06.02 SHORTNESS OF BREATH: ICD-10-CM

## 2025-04-16 DIAGNOSIS — R07.89 CHEST PRESSURE: ICD-10-CM

## 2025-04-16 DIAGNOSIS — E78.00 HYPERCHOLESTEROLEMIA: ICD-10-CM

## 2025-04-16 DIAGNOSIS — I10 ESSENTIAL HYPERTENSION: ICD-10-CM

## 2025-04-16 DIAGNOSIS — I25.5 CARDIOMYOPATHY, ISCHEMIC: ICD-10-CM

## 2025-04-16 LAB
ALBUMIN SERPL-MCNC: 4.4 G/DL (ref 3.5–5.2)
ALBUMIN/GLOB SERPL: 1.5 G/DL
ALP SERPL-CCNC: 48 U/L (ref 39–117)
ALT SERPL W P-5'-P-CCNC: 13 U/L (ref 1–41)
ANION GAP SERPL CALCULATED.3IONS-SCNC: 11.4 MMOL/L (ref 5–15)
AST SERPL-CCNC: 23 U/L (ref 1–40)
BASOPHILS # BLD AUTO: 0.08 10*3/MM3 (ref 0–0.2)
BASOPHILS NFR BLD AUTO: 1.3 % (ref 0–1.5)
BILIRUB SERPL-MCNC: 0.5 MG/DL (ref 0–1.2)
BUN SERPL-MCNC: 21 MG/DL (ref 8–23)
BUN/CREAT SERPL: 18.9 (ref 7–25)
CALCIUM SPEC-SCNC: 9.3 MG/DL (ref 8.6–10.5)
CHLORIDE SERPL-SCNC: 102 MMOL/L (ref 98–107)
CHOLEST SERPL-MCNC: 129 MG/DL (ref 0–200)
CO2 SERPL-SCNC: 23.6 MMOL/L (ref 22–29)
CREAT SERPL-MCNC: 1.11 MG/DL (ref 0.76–1.27)
DEPRECATED RDW RBC AUTO: 47.3 FL (ref 37–54)
EGFRCR SERPLBLD CKD-EPI 2021: 71.4 ML/MIN/1.73
EOSINOPHIL # BLD AUTO: 0.35 10*3/MM3 (ref 0–0.4)
EOSINOPHIL NFR BLD AUTO: 5.6 % (ref 0.3–6.2)
ERYTHROCYTE [DISTWIDTH] IN BLOOD BY AUTOMATED COUNT: 13.8 % (ref 12.3–15.4)
GLOBULIN UR ELPH-MCNC: 3 GM/DL
GLUCOSE SERPL-MCNC: 242 MG/DL (ref 65–99)
HBA1C MFR BLD: 10.7 % (ref 4.8–5.6)
HCT VFR BLD AUTO: 44.9 % (ref 37.5–51)
HDLC SERPL-MCNC: 40 MG/DL (ref 40–60)
HGB BLD-MCNC: 14.2 G/DL (ref 13–17.7)
IMM GRANULOCYTES # BLD AUTO: 0.04 10*3/MM3 (ref 0–0.05)
IMM GRANULOCYTES NFR BLD AUTO: 0.6 % (ref 0–0.5)
LDLC SERPL CALC-MCNC: 66 MG/DL (ref 0–100)
LDLC/HDLC SERPL: 1.57 {RATIO}
LYMPHOCYTES # BLD AUTO: 1.2 10*3/MM3 (ref 0.7–3.1)
LYMPHOCYTES NFR BLD AUTO: 19.1 % (ref 19.6–45.3)
MAGNESIUM SERPL-MCNC: 2.4 MG/DL (ref 1.6–2.4)
MCH RBC QN AUTO: 29.3 PG (ref 26.6–33)
MCHC RBC AUTO-ENTMCNC: 31.6 G/DL (ref 31.5–35.7)
MCV RBC AUTO: 92.8 FL (ref 79–97)
MONOCYTES # BLD AUTO: 0.38 10*3/MM3 (ref 0.1–0.9)
MONOCYTES NFR BLD AUTO: 6 % (ref 5–12)
NEUTROPHILS NFR BLD AUTO: 4.24 10*3/MM3 (ref 1.7–7)
NEUTROPHILS NFR BLD AUTO: 67.4 % (ref 42.7–76)
NRBC BLD AUTO-RTO: 0 /100 WBC (ref 0–0.2)
NT-PROBNP SERPL-MCNC: 4050 PG/ML (ref 0–900)
PLATELET # BLD AUTO: 165 10*3/MM3 (ref 140–450)
PMV BLD AUTO: 11.4 FL (ref 6–12)
POTASSIUM SERPL-SCNC: 4.8 MMOL/L (ref 3.5–5.2)
PROT SERPL-MCNC: 7.4 G/DL (ref 6–8.5)
RBC # BLD AUTO: 4.84 10*6/MM3 (ref 4.14–5.8)
SODIUM SERPL-SCNC: 137 MMOL/L (ref 136–145)
TRIGL SERPL-MCNC: 131 MG/DL (ref 0–150)
TSH SERPL DL<=0.05 MIU/L-ACNC: 4.69 UIU/ML (ref 0.27–4.2)
VLDLC SERPL-MCNC: 23 MG/DL (ref 5–40)
WBC NRBC COR # BLD AUTO: 6.29 10*3/MM3 (ref 3.4–10.8)

## 2025-04-16 PROCEDURE — 83036 HEMOGLOBIN GLYCOSYLATED A1C: CPT

## 2025-04-16 PROCEDURE — A9500 TC99M SESTAMIBI: HCPCS | Performed by: INTERNAL MEDICINE

## 2025-04-16 PROCEDURE — 25010000002 SULFUR HEXAFLUORIDE MICROSPH 60.7-25 MG RECONSTITUTED SUSPENSION: Performed by: INTERNAL MEDICINE

## 2025-04-16 PROCEDURE — 83880 ASSAY OF NATRIURETIC PEPTIDE: CPT

## 2025-04-16 PROCEDURE — 93306 TTE W/DOPPLER COMPLETE: CPT

## 2025-04-16 PROCEDURE — 78452 HT MUSCLE IMAGE SPECT MULT: CPT

## 2025-04-16 PROCEDURE — 84443 ASSAY THYROID STIM HORMONE: CPT

## 2025-04-16 PROCEDURE — 93017 CV STRESS TEST TRACING ONLY: CPT

## 2025-04-16 PROCEDURE — 34310000005 TECHNETIUM SESTAMIBI: Performed by: INTERNAL MEDICINE

## 2025-04-16 PROCEDURE — 36415 COLL VENOUS BLD VENIPUNCTURE: CPT

## 2025-04-16 PROCEDURE — 80061 LIPID PANEL: CPT

## 2025-04-16 PROCEDURE — 85025 COMPLETE CBC W/AUTO DIFF WBC: CPT

## 2025-04-16 PROCEDURE — 80053 COMPREHEN METABOLIC PANEL: CPT

## 2025-04-16 PROCEDURE — 83735 ASSAY OF MAGNESIUM: CPT

## 2025-04-16 PROCEDURE — 25010000002 REGADENOSON 0.4 MG/5ML SOLUTION: Performed by: INTERNAL MEDICINE

## 2025-04-16 RX ORDER — REGADENOSON 0.08 MG/ML
0.4 INJECTION, SOLUTION INTRAVENOUS
Status: COMPLETED | OUTPATIENT
Start: 2025-04-16 | End: 2025-04-16

## 2025-04-16 RX ADMIN — REGADENOSON 0.4 MG: 0.08 INJECTION, SOLUTION INTRAVENOUS at 11:57

## 2025-04-16 RX ADMIN — TECHNETIUM TC 99M SESTAMIBI 1 DOSE: 1 INJECTION INTRAVENOUS at 09:29

## 2025-04-16 RX ADMIN — TECHNETIUM TC 99M SESTAMIBI 1 DOSE: 1 INJECTION INTRAVENOUS at 11:57

## 2025-04-16 RX ADMIN — SULFUR HEXAFLUORIDE 2 ML: KIT at 10:49

## 2025-04-17 LAB
AORTIC DIMENSIONLESS INDEX: 0.68 (DI)
AV MEAN PRESS GRAD SYS DOP V1V2: 2.6 MMHG
AV VMAX SYS DOP: 104.8 CM/SEC
BH CV ECHO MEAS - ACS: 1.8 CM
BH CV ECHO MEAS - AO MAX PG: 4.4 MMHG
BH CV ECHO MEAS - AO ROOT DIAM: 2.9 CM
BH CV ECHO MEAS - AO V2 VTI: 20.7 CM
BH CV ECHO MEAS - EDV(CUBED): 344.5 ML
BH CV ECHO MEAS - EDV(MOD-SP4): 191 ML
BH CV ECHO MEAS - EF(MOD-SP4): 33.5 %
BH CV ECHO MEAS - ESV(CUBED): 170 ML
BH CV ECHO MEAS - ESV(MOD-SP4): 127 ML
BH CV ECHO MEAS - FS: 21 %
BH CV ECHO MEAS - IVS/LVPW: 0.97 CM
BH CV ECHO MEAS - IVSD: 0.98 CM
BH CV ECHO MEAS - LA DIMENSION: 4.5 CM
BH CV ECHO MEAS - LAT PEAK E' VEL: 4.6 CM/SEC
BH CV ECHO MEAS - LV DIASTOLIC VOL/BSA (35-75): 91.9 CM2
BH CV ECHO MEAS - LV MASS(C)D: 321.1 GRAMS
BH CV ECHO MEAS - LV MAX PG: 1.77 MMHG
BH CV ECHO MEAS - LV MEAN PG: 1.08 MMHG
BH CV ECHO MEAS - LV SYSTOLIC VOL/BSA (12-30): 61.1 CM2
BH CV ECHO MEAS - LV V1 MAX: 66.5 CM/SEC
BH CV ECHO MEAS - LV V1 VTI: 14.1 CM
BH CV ECHO MEAS - LVIDD: 7 CM
BH CV ECHO MEAS - LVIDS: 5.5 CM
BH CV ECHO MEAS - LVPWD: 1.01 CM
BH CV ECHO MEAS - MED PEAK E' VEL: 5.8 CM/SEC
BH CV ECHO MEAS - MR MAX PG: 79.8 MMHG
BH CV ECHO MEAS - MR MAX VEL: 446.7 CM/SEC
BH CV ECHO MEAS - MR MEAN PG: 48.7 MMHG
BH CV ECHO MEAS - MR MEAN VEL: 318.7 CM/SEC
BH CV ECHO MEAS - MR VTI: 140.3 CM
BH CV ECHO MEAS - MV DEC SLOPE: 451.8 CM/SEC2
BH CV ECHO MEAS - MV DEC TIME: 0.12 SEC
BH CV ECHO MEAS - MV E MAX VEL: 135 CM/SEC
BH CV ECHO MEAS - MV MAX PG: 11 MMHG
BH CV ECHO MEAS - MV MEAN PG: 3.7 MMHG
BH CV ECHO MEAS - MV P1/2T: 100 MSEC
BH CV ECHO MEAS - MV V2 VTI: 39.2 CM
BH CV ECHO MEAS - MVA(P1/2T): 2.2 CM2
BH CV ECHO MEAS - RAP SYSTOLE: 10 MMHG
BH CV ECHO MEAS - RVDD: 3.2 CM
BH CV ECHO MEAS - RVSP: 53.5 MMHG
BH CV ECHO MEAS - SV(MOD-SP4): 64 ML
BH CV ECHO MEAS - SVI(MOD-SP4): 30.8 ML/M2
BH CV ECHO MEAS - TR MAX PG: 43.5 MMHG
BH CV ECHO MEAS - TR MAX VEL: 329.8 CM/SEC
BH CV ECHO MEASUREMENTS AVERAGE E/E' RATIO: 25.96
BH CV REST NUCLEAR ISOTOPE DOSE: 10 MCI
BH CV STRESS COMMENTS STAGE 1: NORMAL
BH CV STRESS DOSE REGADENOSON STAGE 1: 0.4
BH CV STRESS DURATION MIN STAGE 1: 0
BH CV STRESS DURATION SEC STAGE 1: 10
BH CV STRESS NUCLEAR ISOTOPE DOSE: 30 MCI
BH CV STRESS PROTOCOL 1: NORMAL
BH CV STRESS RECOVERY BP: NORMAL MMHG
BH CV STRESS RECOVERY HR: 80 BPM
BH CV STRESS STAGE 1: 1
IVRT: 123 MS
LEFT ATRIUM VOLUME INDEX: 31.8 ML/M2
LV EF 3D SEGMENTATION: 34 %
MAXIMAL PREDICTED HEART RATE: 150 BPM
PERCENT MAX PREDICTED HR: 53.33 %
SPECT HRT GATED+EF W RNC IV: 23 %
STRESS BASELINE BP: NORMAL MMHG
STRESS BASELINE HR: 80 BPM
STRESS PERCENT HR: 63 %
STRESS POST PEAK BP: NORMAL MMHG
STRESS POST PEAK HR: 80 BPM
STRESS TARGET HR: 128 BPM

## 2025-04-21 ENCOUNTER — TELEPHONE (OUTPATIENT)
Dept: CARDIOLOGY | Facility: CLINIC | Age: 70
End: 2025-04-21
Payer: MEDICARE

## 2025-04-21 NOTE — TELEPHONE ENCOUNTER
Per remote transmission patient has been in persistent afib since 04/15/2025. Patient has not had a sustained afib episode as far back as 04/2024. Ventricular rate is controlled at 70 to 90 bpm.     Called patient to inquire on symptoms, no answer. I did leave a voicemail for return call. Report is in [employer] for review.

## 2025-04-21 NOTE — TELEPHONE ENCOUNTER
"Patient returned call. He reports increased shortness of breath, fatigue, feeling \"awful\", and having some palpitations. He denies edema or CP. Taking medications as prescribed.   "

## 2025-04-29 RX ORDER — AMIODARONE HYDROCHLORIDE 400 MG/1
400 TABLET ORAL TAKE AS DIRECTED
Qty: 35 TABLET | Refills: 7 | Status: SHIPPED | OUTPATIENT
Start: 2025-04-29

## 2025-04-29 NOTE — TELEPHONE ENCOUNTER
Patient aware to add amiodarone 400 mg BID x 5 days then decrease to once a day.  EKG scheduled on Friday 05/02/2025, 10 am EST.  Patient request script be sent to [facility].

## 2025-05-02 ENCOUNTER — TELEPHONE (OUTPATIENT)
Dept: CARDIOLOGY | Facility: CLINIC | Age: 70
End: 2025-05-02

## 2025-05-02 ENCOUNTER — CLINICAL SUPPORT (OUTPATIENT)
Dept: CARDIOLOGY | Facility: CLINIC | Age: 70
End: 2025-05-02
Payer: MEDICARE

## 2025-05-02 DIAGNOSIS — I50.30 CONGESTIVE HEART FAILURE WITH LV DIASTOLIC DYSFUNCTION, NYHA CLASS 2: ICD-10-CM

## 2025-05-02 DIAGNOSIS — I48.0 PAF (PAROXYSMAL ATRIAL FIBRILLATION): Primary | ICD-10-CM

## 2025-05-02 PROCEDURE — 93000 ELECTROCARDIOGRAM COMPLETE: CPT | Performed by: INTERNAL MEDICINE

## 2025-05-02 NOTE — TELEPHONE ENCOUNTER
Per Dr Anaya, after reviewing his EKG, continue current plan to decrease the amiodarone to 400 mg once a day after the 5 days. Understanding voiced.

## 2025-05-02 NOTE — PROGRESS NOTES
Procedure     ECG 12 Lead    Date/Time: 5/2/2025 12:07 PM  Performed by: Alfonso Anaya MD    Authorized by: Alfonso Anaya MD  Comparison: compared with previous ECG from 4/2/2025  Similar to previous ECG  Rhythm: paced  Rate: normal  QRS axis: normal    Clinical impression: abnormal EKG

## 2025-07-11 LAB
MC_CV_MDC_IDC_RATE_1: 171
MC_CV_MDC_IDC_RATE_1: 222
MC_CV_MDC_IDC_SHOCK_MEASURED_IMPEDANCE: 36
MC_CV_MDC_IDC_THERAPIES: NORMAL
MC_CV_MDC_IDC_THERAPIES: NORMAL
MC_CV_MDC_IDC_ZONE_ID: 1
MC_CV_MDC_IDC_ZONE_ID: 2
MC_CV_MDC_IDC_ZONE_ID: 3
MDC_IDC_MSMT_BATTERY_REMAINING_LONGEVITY: 24 MO
MDC_IDC_MSMT_BATTERY_REMAINING_PERCENTAGE: 34 %
MDC_IDC_MSMT_BATTERY_RRT_TRIGGER: 2.59
MDC_IDC_MSMT_BATTERY_STATUS: NORMAL
MDC_IDC_MSMT_BATTERY_VOLTAGE: 2.9
MDC_IDC_MSMT_CAP_CHARGE_TIME: 8.6
MDC_IDC_MSMT_LEADCHNL_LV_DTM: NORMAL
MDC_IDC_MSMT_LEADCHNL_LV_IMPEDANCE_VALUE: 1025
MDC_IDC_MSMT_LEADCHNL_LV_PACING_THRESHOLD_AMPLITUDE: 2
MDC_IDC_MSMT_LEADCHNL_LV_PACING_THRESHOLD_POLARITY: NORMAL
MDC_IDC_MSMT_LEADCHNL_LV_PACING_THRESHOLD_PULSEWIDTH: 0.5
MDC_IDC_MSMT_LEADCHNL_RA_DTM: NORMAL
MDC_IDC_MSMT_LEADCHNL_RA_IMPEDANCE_VALUE: 280
MDC_IDC_MSMT_LEADCHNL_RA_PACING_THRESHOLD_AMPLITUDE: 0.62
MDC_IDC_MSMT_LEADCHNL_RA_PACING_THRESHOLD_POLARITY: NORMAL
MDC_IDC_MSMT_LEADCHNL_RA_PACING_THRESHOLD_PULSEWIDTH: 0.5
MDC_IDC_MSMT_LEADCHNL_RA_SENSING_INTR_AMPL: 2
MDC_IDC_MSMT_LEADCHNL_RV_DTM: NORMAL
MDC_IDC_MSMT_LEADCHNL_RV_IMPEDANCE_VALUE: 410
MDC_IDC_MSMT_LEADCHNL_RV_PACING_THRESHOLD_AMPLITUDE: 0.88
MDC_IDC_MSMT_LEADCHNL_RV_PACING_THRESHOLD_POLARITY: NORMAL
MDC_IDC_MSMT_LEADCHNL_RV_PACING_THRESHOLD_PULSEWIDTH: 0.5
MDC_IDC_MSMT_LEADCHNL_RV_SENSING_INTR_AMPL: 12
MDC_IDC_PG_IMPLANT_DTM: NORMAL
MDC_IDC_PG_MFG: NORMAL
MDC_IDC_PG_MODEL: NORMAL
MDC_IDC_PG_SERIAL: NORMAL
MDC_IDC_PG_TYPE: NORMAL
MDC_IDC_SESS_DTM: NORMAL
MDC_IDC_SESS_TYPE: NORMAL
MDC_IDC_SET_BRADY_AT_MODE_SWITCH_RATE: 180
MDC_IDC_SET_BRADY_LOWRATE: 70
MDC_IDC_SET_BRADY_MAX_SENSOR_RATE: 120
MDC_IDC_SET_BRADY_MAX_TRACKING_RATE: 120
MDC_IDC_SET_BRADY_MODE: NORMAL
MDC_IDC_SET_BRADY_PAV_DELAY: 150
MDC_IDC_SET_BRADY_SAV_DELAY: 100
MDC_IDC_SET_CRT_LVRV_DELAY: 20
MDC_IDC_SET_CRT_PACED_CHAMBERS: NORMAL
MDC_IDC_SET_LEADCHNL_LV_PACING_AMPLITUDE: 3
MDC_IDC_SET_LEADCHNL_LV_PACING_POLARITY: NORMAL
MDC_IDC_SET_LEADCHNL_LV_PACING_PULSEWIDTH: 0.5
MDC_IDC_SET_LEADCHNL_RA_PACING_AMPLITUDE: 1.62
MDC_IDC_SET_LEADCHNL_RA_PACING_POLARITY: NORMAL
MDC_IDC_SET_LEADCHNL_RA_PACING_PULSEWIDTH: 0.5
MDC_IDC_SET_LEADCHNL_RA_SENSING_POLARITY: NORMAL
MDC_IDC_SET_LEADCHNL_RA_SENSING_SENSITIVITY: 0.5
MDC_IDC_SET_LEADCHNL_RV_PACING_AMPLITUDE: 2
MDC_IDC_SET_LEADCHNL_RV_PACING_POLARITY: NORMAL
MDC_IDC_SET_LEADCHNL_RV_PACING_PULSEWIDTH: 0.5
MDC_IDC_SET_LEADCHNL_RV_SENSING_POLARITY: NORMAL
MDC_IDC_SET_LEADCHNL_RV_SENSING_SENSITIVITY: 0.5
MDC_IDC_SET_ZONE_STATUS: NORMAL
MDC_IDC_SET_ZONE_TYPE: NORMAL
MDC_IDC_STAT_AT_BURDEN_PERCENT: 14
MDC_IDC_STAT_BRADY_RA_PERCENT_PACED: 81
MDC_IDC_STAT_BRADY_RV_PERCENT_PACED: 99.4
MDC_IDC_STAT_CRT_PERCENT_PACED: 97
MDC_IDC_STAT_TACHYTHERAPY_ATP_DELIVERED_RECENT: 0
MDC_IDC_STAT_TACHYTHERAPY_SHOCKS_ABORTED_RECENT: 0
MDC_IDC_STAT_TACHYTHERAPY_SHOCKS_DELIVERED_RECENT: 0